# Patient Record
Sex: FEMALE | Race: WHITE | NOT HISPANIC OR LATINO | Employment: OTHER | URBAN - METROPOLITAN AREA
[De-identification: names, ages, dates, MRNs, and addresses within clinical notes are randomized per-mention and may not be internally consistent; named-entity substitution may affect disease eponyms.]

---

## 2024-10-24 ENCOUNTER — HOSPITAL ENCOUNTER (INPATIENT)
Facility: HOSPITAL | Age: 74
DRG: 603 | End: 2024-10-24
Attending: EMERGENCY MEDICINE | Admitting: INTERNAL MEDICINE
Payer: MEDICARE

## 2024-10-24 ENCOUNTER — APPOINTMENT (OUTPATIENT)
Dept: CARDIOLOGY | Facility: HOSPITAL | Age: 74
DRG: 603 | End: 2024-10-24
Payer: MEDICARE

## 2024-10-24 ENCOUNTER — APPOINTMENT (OUTPATIENT)
Dept: RADIOLOGY | Facility: HOSPITAL | Age: 74
DRG: 603 | End: 2024-10-24
Payer: MEDICARE

## 2024-10-24 DIAGNOSIS — R26.2 INABILITY TO WALK: ICD-10-CM

## 2024-10-24 DIAGNOSIS — R62.7 FAILURE TO THRIVE IN ADULT: ICD-10-CM

## 2024-10-24 DIAGNOSIS — R53.1 GENERALIZED WEAKNESS: ICD-10-CM

## 2024-10-24 DIAGNOSIS — R29.6 FREQUENT FALLS: Primary | ICD-10-CM

## 2024-10-24 LAB
ALBUMIN SERPL BCP-MCNC: 3.8 G/DL (ref 3.4–5)
ALP SERPL-CCNC: 70 U/L (ref 33–136)
ALT SERPL W P-5'-P-CCNC: 9 U/L (ref 7–45)
ANION GAP SERPL CALCULATED.3IONS-SCNC: 10 MMOL/L (ref 10–20)
APPEARANCE UR: ABNORMAL
AST SERPL W P-5'-P-CCNC: 9 U/L (ref 9–39)
ATRIAL RATE: 108 BPM
BACTERIA #/AREA URNS AUTO: ABNORMAL /HPF
BASOPHILS # BLD AUTO: 0.06 X10*3/UL (ref 0–0.1)
BASOPHILS NFR BLD AUTO: 0.5 %
BILIRUB SERPL-MCNC: 0.6 MG/DL (ref 0–1.2)
BILIRUB UR STRIP.AUTO-MCNC: NEGATIVE MG/DL
BNP SERPL-MCNC: 39 PG/ML (ref 0–99)
BUN SERPL-MCNC: 9 MG/DL (ref 6–23)
CALCIUM SERPL-MCNC: 9.4 MG/DL (ref 8.6–10.3)
CARDIAC TROPONIN I PNL SERPL HS: 10 NG/L (ref 0–13)
CARDIAC TROPONIN I PNL SERPL HS: 9 NG/L (ref 0–13)
CHLORIDE SERPL-SCNC: 100 MMOL/L (ref 98–107)
CO2 SERPL-SCNC: 31 MMOL/L (ref 21–32)
COLOR UR: ABNORMAL
CREAT SERPL-MCNC: 0.56 MG/DL (ref 0.5–1.05)
EGFRCR SERPLBLD CKD-EPI 2021: >90 ML/MIN/1.73M*2
EOSINOPHIL # BLD AUTO: 0.13 X10*3/UL (ref 0–0.4)
EOSINOPHIL NFR BLD AUTO: 1.2 %
ERYTHROCYTE [DISTWIDTH] IN BLOOD BY AUTOMATED COUNT: 13.3 % (ref 11.5–14.5)
GLUCOSE BLD MANUAL STRIP-MCNC: 165 MG/DL (ref 74–99)
GLUCOSE BLD MANUAL STRIP-MCNC: 198 MG/DL (ref 74–99)
GLUCOSE SERPL-MCNC: 218 MG/DL (ref 74–99)
GLUCOSE UR STRIP.AUTO-MCNC: ABNORMAL MG/DL
HCT VFR BLD AUTO: 43 % (ref 36–46)
HGB BLD-MCNC: 14.6 G/DL (ref 12–16)
IMM GRANULOCYTES # BLD AUTO: 0.04 X10*3/UL (ref 0–0.5)
IMM GRANULOCYTES NFR BLD AUTO: 0.4 % (ref 0–0.9)
KETONES UR STRIP.AUTO-MCNC: NEGATIVE MG/DL
LEUKOCYTE ESTERASE UR QL STRIP.AUTO: ABNORMAL
LYMPHOCYTES # BLD AUTO: 1.41 X10*3/UL (ref 0.8–3)
LYMPHOCYTES NFR BLD AUTO: 12.9 %
MAGNESIUM SERPL-MCNC: 1.96 MG/DL (ref 1.6–2.4)
MCH RBC QN AUTO: 29.3 PG (ref 26–34)
MCHC RBC AUTO-ENTMCNC: 34 G/DL (ref 32–36)
MCV RBC AUTO: 86 FL (ref 80–100)
MONOCYTES # BLD AUTO: 0.91 X10*3/UL (ref 0.05–0.8)
MONOCYTES NFR BLD AUTO: 8.3 %
NEUTROPHILS # BLD AUTO: 8.41 X10*3/UL (ref 1.6–5.5)
NEUTROPHILS NFR BLD AUTO: 76.7 %
NITRITE UR QL STRIP.AUTO: NEGATIVE
NRBC BLD-RTO: 0 /100 WBCS (ref 0–0)
P AXIS: 14 DEGREES
P OFFSET: 188 MS
P ONSET: 128 MS
PH UR STRIP.AUTO: 6 [PH]
PLATELET # BLD AUTO: 361 X10*3/UL (ref 150–450)
POTASSIUM SERPL-SCNC: 3.4 MMOL/L (ref 3.5–5.3)
PR INTERVAL: 196 MS
PROT SERPL-MCNC: 7.1 G/DL (ref 6.4–8.2)
PROT UR STRIP.AUTO-MCNC: NEGATIVE MG/DL
Q ONSET: 226 MS
QRS COUNT: 18 BEATS
QRS DURATION: 60 MS
QT INTERVAL: 322 MS
QTC CALCULATION(BAZETT): 431 MS
QTC FREDERICIA: 391 MS
R AXIS: -11 DEGREES
RBC # BLD AUTO: 4.99 X10*6/UL (ref 4–5.2)
RBC # UR STRIP.AUTO: ABNORMAL /UL
RBC #/AREA URNS AUTO: >20 /HPF
SODIUM SERPL-SCNC: 138 MMOL/L (ref 136–145)
SP GR UR STRIP.AUTO: 1
SQUAMOUS #/AREA URNS AUTO: ABNORMAL /HPF
T AXIS: -14 DEGREES
T OFFSET: 387 MS
UROBILINOGEN UR STRIP.AUTO-MCNC: NORMAL MG/DL
VENTRICULAR RATE: 108 BPM
WBC # BLD AUTO: 11 X10*3/UL (ref 4.4–11.3)
WBC #/AREA URNS AUTO: >50 /HPF
WBC CLUMPS #/AREA URNS AUTO: ABNORMAL /HPF

## 2024-10-24 PROCEDURE — 84484 ASSAY OF TROPONIN QUANT: CPT

## 2024-10-24 PROCEDURE — 93005 ELECTROCARDIOGRAM TRACING: CPT

## 2024-10-24 PROCEDURE — 2500000001 HC RX 250 WO HCPCS SELF ADMINISTERED DRUGS (ALT 637 FOR MEDICARE OP): Performed by: INTERNAL MEDICINE

## 2024-10-24 PROCEDURE — 71045 X-RAY EXAM CHEST 1 VIEW: CPT | Performed by: RADIOLOGY

## 2024-10-24 PROCEDURE — 72170 X-RAY EXAM OF PELVIS: CPT | Performed by: RADIOLOGY

## 2024-10-24 PROCEDURE — 83735 ASSAY OF MAGNESIUM: CPT

## 2024-10-24 PROCEDURE — 36415 COLL VENOUS BLD VENIPUNCTURE: CPT

## 2024-10-24 PROCEDURE — 72170 X-RAY EXAM OF PELVIS: CPT

## 2024-10-24 PROCEDURE — 99285 EMERGENCY DEPT VISIT HI MDM: CPT

## 2024-10-24 PROCEDURE — 81001 URINALYSIS AUTO W/SCOPE: CPT

## 2024-10-24 PROCEDURE — 71045 X-RAY EXAM CHEST 1 VIEW: CPT

## 2024-10-24 PROCEDURE — 82947 ASSAY GLUCOSE BLOOD QUANT: CPT

## 2024-10-24 PROCEDURE — 83880 ASSAY OF NATRIURETIC PEPTIDE: CPT

## 2024-10-24 PROCEDURE — 84132 ASSAY OF SERUM POTASSIUM: CPT

## 2024-10-24 PROCEDURE — 93010 ELECTROCARDIOGRAM REPORT: CPT | Performed by: INTERNAL MEDICINE

## 2024-10-24 PROCEDURE — 87186 SC STD MICRODIL/AGAR DIL: CPT | Mod: WESLAB

## 2024-10-24 PROCEDURE — 2500000004 HC RX 250 GENERAL PHARMACY W/ HCPCS (ALT 636 FOR OP/ED): Performed by: INTERNAL MEDICINE

## 2024-10-24 PROCEDURE — 72100 X-RAY EXAM L-S SPINE 2/3 VWS: CPT | Performed by: RADIOLOGY

## 2024-10-24 PROCEDURE — 2500000001 HC RX 250 WO HCPCS SELF ADMINISTERED DRUGS (ALT 637 FOR MEDICARE OP)

## 2024-10-24 PROCEDURE — 1200000002 HC GENERAL ROOM WITH TELEMETRY DAILY

## 2024-10-24 PROCEDURE — 85025 COMPLETE CBC W/AUTO DIFF WBC: CPT

## 2024-10-24 PROCEDURE — 2500000002 HC RX 250 W HCPCS SELF ADMINISTERED DRUGS (ALT 637 FOR MEDICARE OP, ALT 636 FOR OP/ED): Performed by: INTERNAL MEDICINE

## 2024-10-24 PROCEDURE — 99222 1ST HOSP IP/OBS MODERATE 55: CPT | Performed by: INTERNAL MEDICINE

## 2024-10-24 PROCEDURE — 72100 X-RAY EXAM L-S SPINE 2/3 VWS: CPT

## 2024-10-24 RX ORDER — ACETAMINOPHEN 325 MG/1
975 TABLET ORAL ONCE
Status: COMPLETED | OUTPATIENT
Start: 2024-10-24 | End: 2024-10-24

## 2024-10-24 RX ORDER — ENOXAPARIN SODIUM 100 MG/ML
40 INJECTION SUBCUTANEOUS DAILY
Status: DISCONTINUED | OUTPATIENT
Start: 2024-10-25 | End: 2024-11-04 | Stop reason: HOSPADM

## 2024-10-24 RX ORDER — ACETAMINOPHEN 650 MG/1
650 SUPPOSITORY RECTAL EVERY 4 HOURS PRN
Status: DISCONTINUED | OUTPATIENT
Start: 2024-10-24 | End: 2024-11-04 | Stop reason: HOSPADM

## 2024-10-24 RX ORDER — GUAIFENESIN/DEXTROMETHORPHAN 100-10MG/5
5 SYRUP ORAL EVERY 4 HOURS PRN
Status: DISCONTINUED | OUTPATIENT
Start: 2024-10-24 | End: 2024-11-04 | Stop reason: HOSPADM

## 2024-10-24 RX ORDER — ACETAMINOPHEN 325 MG/1
650 TABLET ORAL EVERY 4 HOURS PRN
Status: DISCONTINUED | OUTPATIENT
Start: 2024-10-24 | End: 2024-11-04 | Stop reason: HOSPADM

## 2024-10-24 RX ORDER — PANTOPRAZOLE SODIUM 40 MG/10ML
40 INJECTION, POWDER, LYOPHILIZED, FOR SOLUTION INTRAVENOUS DAILY
Status: DISCONTINUED | OUTPATIENT
Start: 2024-10-25 | End: 2024-11-04 | Stop reason: HOSPADM

## 2024-10-24 RX ORDER — INSULIN LISPRO 100 [IU]/ML
0-5 INJECTION, SOLUTION INTRAVENOUS; SUBCUTANEOUS
Status: DISCONTINUED | OUTPATIENT
Start: 2024-10-24 | End: 2024-11-04 | Stop reason: HOSPADM

## 2024-10-24 RX ORDER — ACETAMINOPHEN 160 MG/5ML
650 SOLUTION ORAL EVERY 4 HOURS PRN
Status: DISCONTINUED | OUTPATIENT
Start: 2024-10-24 | End: 2024-11-04 | Stop reason: HOSPADM

## 2024-10-24 RX ORDER — TALC
3 POWDER (GRAM) TOPICAL NIGHTLY PRN
Status: DISCONTINUED | OUTPATIENT
Start: 2024-10-24 | End: 2024-11-04 | Stop reason: HOSPADM

## 2024-10-24 RX ORDER — PANTOPRAZOLE SODIUM 40 MG/1
40 TABLET, DELAYED RELEASE ORAL DAILY
Status: DISCONTINUED | OUTPATIENT
Start: 2024-10-25 | End: 2024-11-04 | Stop reason: HOSPADM

## 2024-10-24 RX ORDER — DOCUSATE SODIUM 100 MG/1
100 CAPSULE, LIQUID FILLED ORAL 2 TIMES DAILY
Status: DISCONTINUED | OUTPATIENT
Start: 2024-10-24 | End: 2024-11-04 | Stop reason: HOSPADM

## 2024-10-24 RX ORDER — ONDANSETRON 4 MG/1
4 TABLET, FILM COATED ORAL EVERY 8 HOURS PRN
Status: DISCONTINUED | OUTPATIENT
Start: 2024-10-24 | End: 2024-11-04 | Stop reason: HOSPADM

## 2024-10-24 RX ORDER — ONDANSETRON HYDROCHLORIDE 2 MG/ML
4 INJECTION, SOLUTION INTRAVENOUS EVERY 8 HOURS PRN
Status: DISCONTINUED | OUTPATIENT
Start: 2024-10-24 | End: 2024-11-04 | Stop reason: HOSPADM

## 2024-10-24 RX ORDER — GUAIFENESIN 600 MG/1
600 TABLET, EXTENDED RELEASE ORAL EVERY 12 HOURS PRN
Status: DISCONTINUED | OUTPATIENT
Start: 2024-10-24 | End: 2024-11-04 | Stop reason: HOSPADM

## 2024-10-24 RX ORDER — POLYETHYLENE GLYCOL 3350 17 G/17G
17 POWDER, FOR SOLUTION ORAL DAILY
Status: DISCONTINUED | OUTPATIENT
Start: 2024-10-24 | End: 2024-11-04 | Stop reason: HOSPADM

## 2024-10-24 SDOH — SOCIAL STABILITY: SOCIAL INSECURITY: ARE YOU OR HAVE YOU BEEN THREATENED OR ABUSED PHYSICALLY, EMOTIONALLY, OR SEXUALLY BY ANYONE?: NO

## 2024-10-24 SDOH — SOCIAL STABILITY: SOCIAL INSECURITY: ARE THERE ANY APPARENT SIGNS OF INJURIES/BEHAVIORS THAT COULD BE RELATED TO ABUSE/NEGLECT?: NO

## 2024-10-24 SDOH — SOCIAL STABILITY: SOCIAL INSECURITY: ABUSE: ADULT

## 2024-10-24 SDOH — SOCIAL STABILITY: SOCIAL INSECURITY: DO YOU FEEL UNSAFE GOING BACK TO THE PLACE WHERE YOU ARE LIVING?: NO

## 2024-10-24 SDOH — SOCIAL STABILITY: SOCIAL INSECURITY: HAS ANYONE EVER THREATENED TO HURT YOUR FAMILY OR YOUR PETS?: NO

## 2024-10-24 SDOH — SOCIAL STABILITY: SOCIAL INSECURITY: HAVE YOU HAD THOUGHTS OF HARMING ANYONE ELSE?: NO

## 2024-10-24 SDOH — SOCIAL STABILITY: SOCIAL INSECURITY: HAVE YOU HAD ANY THOUGHTS OF HARMING ANYONE ELSE?: NO

## 2024-10-24 SDOH — SOCIAL STABILITY: SOCIAL INSECURITY: DO YOU FEEL ANYONE HAS EXPLOITED OR TAKEN ADVANTAGE OF YOU FINANCIALLY OR OF YOUR PERSONAL PROPERTY?: NO

## 2024-10-24 SDOH — SOCIAL STABILITY: SOCIAL INSECURITY: DOES ANYONE TRY TO KEEP YOU FROM HAVING/CONTACTING OTHER FRIENDS OR DOING THINGS OUTSIDE YOUR HOME?: NO

## 2024-10-24 SDOH — SOCIAL STABILITY: SOCIAL INSECURITY: WERE YOU ABLE TO COMPLETE ALL THE BEHAVIORAL HEALTH SCREENINGS?: YES

## 2024-10-24 ASSESSMENT — ACTIVITIES OF DAILY LIVING (ADL)
GROOMING: NEEDS ASSISTANCE
HEARING - LEFT EAR: FUNCTIONAL
ASSISTIVE_DEVICE: EYEGLASSES
DRESSING YOURSELF: DEPENDENT
TOILETING: DEPENDENT
WALKS IN HOME: DEPENDENT
FEEDING YOURSELF: NEEDS ASSISTANCE
BATHING: DEPENDENT
JUDGMENT_ADEQUATE_SAFELY_COMPLETE_DAILY_ACTIVITIES: YES
PATIENT'S MEMORY ADEQUATE TO SAFELY COMPLETE DAILY ACTIVITIES?: YES
ADEQUATE_TO_COMPLETE_ADL: YES
HEARING - RIGHT EAR: FUNCTIONAL

## 2024-10-24 ASSESSMENT — ENCOUNTER SYMPTOMS
UNEXPECTED WEIGHT CHANGE: 0
EYE PAIN: 0
POLYPHAGIA: 0
WHEEZING: 0
CHILLS: 0
WOUND: 0
HALLUCINATIONS: 0
ABDOMINAL DISTENTION: 0
DYSURIA: 0
DIZZINESS: 0
DYSPHORIC MOOD: 0
TROUBLE SWALLOWING: 0
MYALGIAS: 0
CHEST TIGHTNESS: 0
CONFUSION: 0
FLANK PAIN: 0
APPETITE CHANGE: 0
LIGHT-HEADEDNESS: 0
AGITATION: 0
EYE ITCHING: 0
SEIZURES: 0
DIARRHEA: 0
SPEECH DIFFICULTY: 0
SORE THROAT: 0
JOINT SWELLING: 0
ARTHRALGIAS: 0
CONSTIPATION: 0
SLEEP DISTURBANCE: 0
NERVOUS/ANXIOUS: 0
HEADACHES: 0
STRIDOR: 0
ANAL BLEEDING: 0
NUMBNESS: 0
RECTAL PAIN: 0
WEAKNESS: 0
COUGH: 0
ACTIVITY CHANGE: 0
HEMATURIA: 0
HYPERACTIVE: 0
DIFFICULTY URINATING: 0
POLYDIPSIA: 0
TREMORS: 0
PHOTOPHOBIA: 0
VOICE CHANGE: 0
NECK PAIN: 0
COLOR CHANGE: 0
SINUS PRESSURE: 0
CHOKING: 0
BLOOD IN STOOL: 0
ADENOPATHY: 0
DECREASED CONCENTRATION: 0
FREQUENCY: 0
PALPITATIONS: 0
EYE DISCHARGE: 0
NAUSEA: 0
EYE REDNESS: 0
FEVER: 0
BRUISES/BLEEDS EASILY: 0
FACIAL SWELLING: 0
VOMITING: 0
FATIGUE: 0
FACIAL ASYMMETRY: 0
APNEA: 0
DIAPHORESIS: 0
ABDOMINAL PAIN: 0
BACK PAIN: 0
RHINORRHEA: 0
SHORTNESS OF BREATH: 0
SINUS PAIN: 0
NECK STIFFNESS: 0

## 2024-10-24 ASSESSMENT — PAIN - FUNCTIONAL ASSESSMENT
PAIN_FUNCTIONAL_ASSESSMENT: 0-10

## 2024-10-24 ASSESSMENT — COGNITIVE AND FUNCTIONAL STATUS - GENERAL
DRESSING REGULAR UPPER BODY CLOTHING: A LITTLE
DRESSING REGULAR LOWER BODY CLOTHING: A LOT
CLIMB 3 TO 5 STEPS WITH RAILING: TOTAL
DRESSING REGULAR UPPER BODY CLOTHING: A LITTLE
WALKING IN HOSPITAL ROOM: TOTAL
MOVING TO AND FROM BED TO CHAIR: A LOT
DAILY ACTIVITIY SCORE: 13
HELP NEEDED FOR BATHING: TOTAL
CLIMB 3 TO 5 STEPS WITH RAILING: TOTAL
TURNING FROM BACK TO SIDE WHILE IN FLAT BAD: A LOT
DRESSING REGULAR UPPER BODY CLOTHING: A LITTLE
PATIENT BASELINE BEDBOUND: NO
DAILY ACTIVITIY SCORE: 13
MOBILITY SCORE: 10
TOILETING: TOTAL
MOVING FROM LYING ON BACK TO SITTING ON SIDE OF FLAT BED WITH BEDRAILS: A LOT
STANDING UP FROM CHAIR USING ARMS: A LOT
WALKING IN HOSPITAL ROOM: TOTAL
MOBILITY SCORE: 10
MOVING FROM LYING ON BACK TO SITTING ON SIDE OF FLAT BED WITH BEDRAILS: A LOT
MOVING TO AND FROM BED TO CHAIR: A LOT
DRESSING REGULAR LOWER BODY CLOTHING: A LOT
DRESSING REGULAR LOWER BODY CLOTHING: A LOT
PERSONAL GROOMING: A LITTLE
MOBILITY SCORE: 10
MOVING TO AND FROM BED TO CHAIR: A LOT
CLIMB 3 TO 5 STEPS WITH RAILING: TOTAL
HELP NEEDED FOR BATHING: TOTAL
EATING MEALS: A LITTLE
WALKING IN HOSPITAL ROOM: TOTAL
TURNING FROM BACK TO SIDE WHILE IN FLAT BAD: A LOT
MOVING FROM LYING ON BACK TO SITTING ON SIDE OF FLAT BED WITH BEDRAILS: A LOT
PERSONAL GROOMING: A LITTLE
DAILY ACTIVITIY SCORE: 13
HELP NEEDED FOR BATHING: TOTAL
EATING MEALS: A LITTLE
TOILETING: TOTAL
PERSONAL GROOMING: A LITTLE
STANDING UP FROM CHAIR USING ARMS: A LOT
STANDING UP FROM CHAIR USING ARMS: A LOT
TURNING FROM BACK TO SIDE WHILE IN FLAT BAD: A LOT
TOILETING: TOTAL
EATING MEALS: A LITTLE

## 2024-10-24 ASSESSMENT — LIFESTYLE VARIABLES
AUDIT-C TOTAL SCORE: 0
SUBSTANCE_ABUSE_PAST_12_MONTHS: NO
HOW MANY STANDARD DRINKS CONTAINING ALCOHOL DO YOU HAVE ON A TYPICAL DAY: PATIENT DOES NOT DRINK
PRESCIPTION_ABUSE_PAST_12_MONTHS: NO
HOW OFTEN DO YOU HAVE 6 OR MORE DRINKS ON ONE OCCASION: NEVER
HOW OFTEN DO YOU HAVE A DRINK CONTAINING ALCOHOL: NEVER
AUDIT-C TOTAL SCORE: 0
SKIP TO QUESTIONS 9-10: 1

## 2024-10-24 ASSESSMENT — PAIN DESCRIPTION - ORIENTATION: ORIENTATION: RIGHT

## 2024-10-24 ASSESSMENT — PATIENT HEALTH QUESTIONNAIRE - PHQ9
1. LITTLE INTEREST OR PLEASURE IN DOING THINGS: NOT AT ALL
SUM OF ALL RESPONSES TO PHQ9 QUESTIONS 1 & 2: 0
2. FEELING DOWN, DEPRESSED OR HOPELESS: NOT AT ALL

## 2024-10-24 ASSESSMENT — PAIN DESCRIPTION - DESCRIPTORS
DESCRIPTORS: ACHING
DESCRIPTORS: ACHING

## 2024-10-24 ASSESSMENT — PAIN DESCRIPTION - LOCATION: LOCATION: KNEE

## 2024-10-24 ASSESSMENT — PAIN SCALES - GENERAL
PAINLEVEL_OUTOF10: 0 - NO PAIN
PAINLEVEL_OUTOF10: 0 - NO PAIN
PAINLEVEL_OUTOF10: 3
PAINLEVEL_OUTOF10: 7
PAINLEVEL_OUTOF10: 2

## 2024-10-24 ASSESSMENT — COLUMBIA-SUICIDE SEVERITY RATING SCALE - C-SSRS
2. HAVE YOU ACTUALLY HAD ANY THOUGHTS OF KILLING YOURSELF?: NO
1. IN THE PAST MONTH, HAVE YOU WISHED YOU WERE DEAD OR WISHED YOU COULD GO TO SLEEP AND NOT WAKE UP?: NO
6. HAVE YOU EVER DONE ANYTHING, STARTED TO DO ANYTHING, OR PREPARED TO DO ANYTHING TO END YOUR LIFE?: NO

## 2024-10-24 NOTE — PROGRESS NOTES
Pharmacy Medication History Review    Marianela Green is a 74 y.o. female admitted for Frequent falls. Pharmacy reviewed the patient's hhzdu-co-rixdbutnp medications and allergies for accuracy.    Medications ADDED:  none  Medications CHANGED:  none  Medications REMOVED:   None      The list below reflects the updated PTA list. Comments regarding how patient may be taking medications differently can be found in the Admit Orders Activity  None        The list below reflects the updated allergy list. Please review each documented allergy for additional clarification and justification.  Allergies  Reviewed by Hue Zhang CPhT on 10/24/2024   No Known Allergies         Pharmacy has been updated to [facility].    Sources used to complete the med history include patient interview. Patient is a fair historian.    Below are additional concerns with the patient's PTA list.  Patient reports not taking any prescription medications or OTC medications.     Hue Zhang CPhT-Adv  Please reach out via [employer] Secure Chat for questions

## 2024-10-24 NOTE — PROGRESS NOTES
Attestation/Supervisory note for ISABELA Kaminski      The patient is a 74-year-old female presenting to the emergency department for evaluation of generalized malaise and fatigue.  The patient reportedly did have a fall about 1 week ago according to her daughter.  The patient states that she just does not have any energy.  She does not have any pain at this time she denies any headache or visual changes.  No chest pain or shortness of breath.  No abdominal pain.  No nausea or vomiting.  No diarrhea or constipation but no urinary complaints.  No fever or chills.  No cough or congestion.  No sick contacts or recent travel.  No cough or congestion.  She does have a home health care nurse who comes and checks on her.  All pertinent positives and negatives are recorded above.  All other systems reviewed and otherwise negative.  Vital signs with diastolic hypertension but otherwise within normal limits.  Physical exam with a well-nourished well-developed female in no acute distress.  She does have a disheveled appearance.  She was decontaminated upon arrival reportedly with a concern for bedbugs.  She has no evidence of airway compromise or respiratory distress.  Abdominal exam is benign.  She does not have any gross motor, neurologic or vascular deficits on exam.  Pulses are equal bilaterally.  NIH stroke scale score of 0.      tPA/TNK is not indicated at this time given last known well time of at least 1 week ago and an NIH stroke scale score of 0 at this time.      EKG with sinus tachycardia at 111 bpm, normal axis, normal voltage, normal ST segment, and normal T waves.  There is some mild limitation by motion artifact.      Oral acetaminophen ordered.      Diagnostic labs with mild electrolyte imbalance and mild hyperglycemia but otherwise unremarkable.    Initial troponin 10.  Repeat troponin 9      UA pending at the time of admission      XR chest 1 view   Final Result   1.  Newly seen leftward paravertebral density is  indeterminate.   Attention to this region recommended on follow-up assessment. If not   previously evaluated recommend further evaluation such as CT   examination.                  MACRO:   None        Signed by: Stephane Tom 10/24/2024 12:34 PM   Dictation workstation:   [id number]      XR lumbar spine 2-3 views   Final Result   No detected acute fracture.        Scattered degenerative changes.        Cholelithiasis.             MACRO:   None        Signed by: Stephane Tom 10/24/2024 12:36 PM   Dictation workstation:   [id number]      XR pelvis 1-2 views   Final Result   No detected acute fracture.        Scattered degenerative changes.        Cholelithiasis.             MACRO:   None        Signed by: Stephane Tom 10/24/2024 12:36 PM   Dictation workstation:   [id number]           The patient does not have any evidence of airway compromise or respiratory distress on exam but she does not have any evidence of hemodynamic instability.  Chest x-ray with a newly seen leftward paravertebral density that is indeterminate per the radiology reading.  There is no evidence of fracture or dislocation on the lumbar spine.  No evidence of fracture or dislocation on the pelvic x-ray.  Diagnostic labs without significant abnormality.      The patient was admitted for further management and anticipated transition to rehab versus long-term care.      Impression/diagnosis:  Fall from standing height, initial encounter  Malaise and fatigue  Diastolic hypertension      I personally saw the patient and made/approve the management plan and take responsibility for the patient management.      I independently interpreted the following study (S) EKG and diagnostic labs      I personally discussed the patient's management with the patient      I reviewed the results of the diagnostic labs and diagnostic imaging.  Formal radiology read was completed by the radiologist.      Anuradha Campos MD

## 2024-10-24 NOTE — ED PROVIDER NOTES
HPI   Chief Complaint   Patient presents with    Weakness, Gen     Generalized weakness. Patient lives home alone. She is covered in feces and urine.  She states her daughter comes to take care of her but states she is unable to walk around. She states she has chronic knee pain.. she is alert and oriented x3 at this time. She states she feels safe at home and feels she can go back there.        Patient is a 74-year-old female presents emergency department for evaluation of generalized fatigue malaise and difficulty with ambulation at home.  Patient states that she lives at home by herself, but has people to come check on her and help her with her activities of daily living.  Patient states that last Thursday she had a mechanical fall and landed on her bottom and has been having pelvic pain and low back pain ever since.  She states that she does not build to ambulate around her house given the pain and generalized fatigue malaise.  She states that she needs more help and thinks she may need a walker for evaluation for better home care moving forward.  He she states that she has not had any nausea or vomiting and denies any recent illnesses.  She not hit her head or lose any consciousness.  She states at the time of the fall she had called EMS for lift assist, but declined transport to the hospital as she was feeling well.  She not hit her head or lose any consciousness.  She denies any fevers, chills, lightheadedness, dizziness, chest pain, shortness of breath, or other complaints at this time.      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  No family history on file.  Social History     Tobacco Use    Smoking status: Not on file    Smokeless tobacco: Not on file   Substance Use Topics    Alcohol use: Not on file     Drug use: Not on file       Physical Exam   ED Triage Vitals [10/24/24 0952]   Temperature Heart Rate Respirations BP   36.9 °C (98.4 °F) 88 18 (!) 148/100      Pulse Ox Temp Source Heart Rate Source Patient Position   99 % Oral Monitor Lying      BP Location FiO2 (%)     -- --       Physical Exam  Constitutional:       Appearance: Normal appearance.   Cardiovascular:      Rate and Rhythm: Normal rate and regular rhythm.   Pulmonary:      Effort: Pulmonary effort is normal.      Breath sounds: Normal breath sounds.   Abdominal:      General: Abdomen is flat.      Palpations: Abdomen is soft.      Tenderness: There is no abdominal tenderness.   Musculoskeletal:         General: Normal range of motion.      Comments: Bilateral lower extremities with range of motion intact bilateral hips with some pain to palpation over pelvis.  Bilateral lower extremities with good sensation and reflexes intact.   Skin:     General: Skin is warm and dry.   Neurological:      General: No focal deficit present.      Mental Status: She is alert and oriented to person, place, and time.           ED Course & MDM   Diagnoses as of 10/24/24 0518   Generalized weakness   Frequent falls   Inability to walk   Failure to thrive in adult                 No data recorded     Spenser Coma Scale Score: 15 (10/24/24 1005 : Sabrina Arrington, LEX)                           Medical Decision Making  Patient is a 74-year-old female presents emergency department for evaluation of generalized fatigue and inability to ambulate well at home.    EKG was interpreted by attending physician and reviewed by me.    Lab work done today included CMP, CBC, magnesium, troponins, proBNP, urinalysis.  Lab work with troponins within normal range borderline hypokalemia otherwise unremarkable.  Urine pending at time admission.    Scans done today were interpreted/confirmed by radiologist and also interpreted by me which included chest x-ray, pelvic x-ray, and lumbar x-ray.   Chest x-ray shows paravertebral density indeterminate with x-ray of lumbar spine and pelvis showing no detected acute fracture with scattered degenerative changes and cholelithiasis otherwise unremarkable.    Medications given at today's visit include PO Tylenol    I saw this patient in conjunction with Dr. Campos.  Patient remained stable while in the emergency department.  EKG shows no evidence ischemia and troponins within normal range.  Lab work today relatively unremarkable.  Patient's daughter did speak with her primary care provider Dr. Ling who wanted patient admitted for further evaluation of decline and inability to ambulate.  Patient appears to have had some frequent falls and difficulty getting around at home and taking care of self at home.  Daughter states she is very stubborn and does unsure as to if she is taking her medications.  X-ray showed no evidence for acute fracture at this time, but given patient's inability to ambulate with frequent falls and inability care for self at home patient would benefit from admission for physical therapy and Occupational Therapy evaluation and likely placement.  I spoke with patient's primary care provider Dr. Ling who is agreeable to admission of patient for further management.    The patient/family was counseled on clinical impression, expectations, and plan along with recommendations to admission.  All questions were answered and involved parties were understanding and agreeable to course of treatment.  Case was discussed with admitting physician and any consultants. Bed type, ED treatment and further ED workup decided by joint decision making with admitting team and any consultants. Patient stable for admission per my assessment and further management of patient will be deferred to the inpatient setting.    ** Disclaimer:  Parts of this document were written utilizing a voice to text dictation software.  Note may contain minor transcription or typographical  errors that were inadvertently transcribed by the computer software.        Procedure  Procedures     Radha Kaminski PA-C  10/24/24 8634

## 2024-10-25 ENCOUNTER — APPOINTMENT (OUTPATIENT)
Dept: RADIOLOGY | Facility: HOSPITAL | Age: 74
DRG: 603 | End: 2024-10-25
Payer: MEDICARE

## 2024-10-25 PROBLEM — B37.2 CANDIDIASIS OF SKIN: Status: ACTIVE | Noted: 2024-10-25

## 2024-10-25 PROBLEM — R73.9 ELEVATED BLOOD SUGAR LEVEL: Status: ACTIVE | Noted: 2024-10-25

## 2024-10-25 PROBLEM — R62.51 FAILURE TO THRIVE (CHILD): Status: ACTIVE | Noted: 2024-10-25

## 2024-10-25 PROBLEM — R93.89 ABNORMAL CHEST X-RAY: Status: ACTIVE | Noted: 2024-10-25

## 2024-10-25 PROBLEM — L03.116 BILATERAL LOWER LEG CELLULITIS: Status: ACTIVE | Noted: 2024-10-25

## 2024-10-25 PROBLEM — I10 HYPERTENSION: Status: ACTIVE | Noted: 2024-10-25

## 2024-10-25 PROBLEM — J44.9 COPD (CHRONIC OBSTRUCTIVE PULMONARY DISEASE) (MULTI): Status: ACTIVE | Noted: 2024-10-25

## 2024-10-25 PROBLEM — L03.115 BILATERAL LOWER LEG CELLULITIS: Status: ACTIVE | Noted: 2024-10-25

## 2024-10-25 LAB
ALBUMIN SERPL BCP-MCNC: 3.3 G/DL (ref 3.4–5)
ALP SERPL-CCNC: 61 U/L (ref 33–136)
ALT SERPL W P-5'-P-CCNC: 8 U/L (ref 7–45)
ANION GAP SERPL CALCULATED.3IONS-SCNC: 11 MMOL/L (ref 10–20)
AST SERPL W P-5'-P-CCNC: 9 U/L (ref 9–39)
BILIRUB SERPL-MCNC: 0.6 MG/DL (ref 0–1.2)
BUN SERPL-MCNC: 6 MG/DL (ref 6–23)
CALCIUM SERPL-MCNC: 9 MG/DL (ref 8.6–10.3)
CHLORIDE SERPL-SCNC: 102 MMOL/L (ref 98–107)
CO2 SERPL-SCNC: 29 MMOL/L (ref 21–32)
CREAT SERPL-MCNC: 0.54 MG/DL (ref 0.5–1.05)
EGFRCR SERPLBLD CKD-EPI 2021: >90 ML/MIN/1.73M*2
ERYTHROCYTE [DISTWIDTH] IN BLOOD BY AUTOMATED COUNT: 13.3 % (ref 11.5–14.5)
GLUCOSE BLD MANUAL STRIP-MCNC: 150 MG/DL (ref 74–99)
GLUCOSE BLD MANUAL STRIP-MCNC: 156 MG/DL (ref 74–99)
GLUCOSE BLD MANUAL STRIP-MCNC: 169 MG/DL (ref 74–99)
GLUCOSE BLD MANUAL STRIP-MCNC: 172 MG/DL (ref 74–99)
GLUCOSE SERPL-MCNC: 167 MG/DL (ref 74–99)
HCT VFR BLD AUTO: 40.4 % (ref 36–46)
HGB BLD-MCNC: 13.3 G/DL (ref 12–16)
HOLD SPECIMEN: NORMAL
MCH RBC QN AUTO: 29.1 PG (ref 26–34)
MCHC RBC AUTO-ENTMCNC: 32.9 G/DL (ref 32–36)
MCV RBC AUTO: 88 FL (ref 80–100)
NRBC BLD-RTO: 0 /100 WBCS (ref 0–0)
PLATELET # BLD AUTO: 383 X10*3/UL (ref 150–450)
POTASSIUM SERPL-SCNC: 3.7 MMOL/L (ref 3.5–5.3)
PROT SERPL-MCNC: 6 G/DL (ref 6.4–8.2)
RBC # BLD AUTO: 4.57 X10*6/UL (ref 4–5.2)
SODIUM SERPL-SCNC: 138 MMOL/L (ref 136–145)
WBC # BLD AUTO: 9.7 X10*3/UL (ref 4.4–11.3)

## 2024-10-25 PROCEDURE — 1200000002 HC GENERAL ROOM WITH TELEMETRY DAILY

## 2024-10-25 PROCEDURE — 2500000002 HC RX 250 W HCPCS SELF ADMINISTERED DRUGS (ALT 637 FOR MEDICARE OP, ALT 636 FOR OP/ED): Performed by: INTERNAL MEDICINE

## 2024-10-25 PROCEDURE — 2500000005 HC RX 250 GENERAL PHARMACY W/O HCPCS: Performed by: INTERNAL MEDICINE

## 2024-10-25 PROCEDURE — 36415 COLL VENOUS BLD VENIPUNCTURE: CPT | Performed by: INTERNAL MEDICINE

## 2024-10-25 PROCEDURE — 2500000004 HC RX 250 GENERAL PHARMACY W/ HCPCS (ALT 636 FOR OP/ED): Performed by: INTERNAL MEDICINE

## 2024-10-25 PROCEDURE — 97162 PT EVAL MOD COMPLEX 30 MIN: CPT | Mod: GP

## 2024-10-25 PROCEDURE — 82947 ASSAY GLUCOSE BLOOD QUANT: CPT

## 2024-10-25 PROCEDURE — 2500000001 HC RX 250 WO HCPCS SELF ADMINISTERED DRUGS (ALT 637 FOR MEDICARE OP): Performed by: INTERNAL MEDICINE

## 2024-10-25 PROCEDURE — 71260 CT THORAX DX C+: CPT

## 2024-10-25 PROCEDURE — 85027 COMPLETE CBC AUTOMATED: CPT | Performed by: INTERNAL MEDICINE

## 2024-10-25 PROCEDURE — 2550000001 HC RX 255 CONTRASTS: Performed by: INTERNAL MEDICINE

## 2024-10-25 PROCEDURE — 97166 OT EVAL MOD COMPLEX 45 MIN: CPT | Mod: GO

## 2024-10-25 PROCEDURE — 80053 COMPREHEN METABOLIC PANEL: CPT | Performed by: INTERNAL MEDICINE

## 2024-10-25 PROCEDURE — 71260 CT THORAX DX C+: CPT | Performed by: RADIOLOGY

## 2024-10-25 PROCEDURE — 99233 SBSQ HOSP IP/OBS HIGH 50: CPT | Performed by: INTERNAL MEDICINE

## 2024-10-25 RX ORDER — CEFTRIAXONE 1 G/50ML
1 INJECTION, SOLUTION INTRAVENOUS EVERY 24 HOURS
Status: DISCONTINUED | OUTPATIENT
Start: 2024-10-25 | End: 2024-11-04 | Stop reason: HOSPADM

## 2024-10-25 RX ORDER — NYSTATIN 100000 [USP'U]/G
POWDER TOPICAL EVERY 8 HOURS
Status: DISCONTINUED | OUTPATIENT
Start: 2024-10-25 | End: 2024-11-04 | Stop reason: HOSPADM

## 2024-10-25 SDOH — ECONOMIC STABILITY: HOUSING INSECURITY: IN THE PAST 12 MONTHS, HOW MANY TIMES HAVE YOU MOVED WHERE YOU WERE LIVING?: 0

## 2024-10-25 SDOH — SOCIAL STABILITY: SOCIAL NETWORK: HOW OFTEN DO YOU ATTEND MEETINGS OF THE CLUBS OR ORGANIZATIONS YOU BELONG TO?: NEVER

## 2024-10-25 SDOH — HEALTH STABILITY: PHYSICAL HEALTH
HOW OFTEN DO YOU NEED TO HAVE SOMEONE HELP YOU WHEN YOU READ INSTRUCTIONS, PAMPHLETS, OR OTHER WRITTEN MATERIAL FROM YOUR DOCTOR OR PHARMACY?: NEVER

## 2024-10-25 SDOH — SOCIAL STABILITY: SOCIAL INSECURITY
WITHIN THE LAST YEAR, HAVE YOU BEEN RAPED OR FORCED TO HAVE ANY KIND OF SEXUAL ACTIVITY BY YOUR PARTNER OR EX-PARTNER?: NO

## 2024-10-25 SDOH — SOCIAL STABILITY: SOCIAL NETWORK
DO YOU BELONG TO ANY CLUBS OR ORGANIZATIONS SUCH AS CHURCH GROUPS, UNIONS, FRATERNAL OR ATHLETIC GROUPS, OR SCHOOL GROUPS?: NO

## 2024-10-25 SDOH — HEALTH STABILITY: MENTAL HEALTH: HOW OFTEN DO YOU HAVE A DRINK CONTAINING ALCOHOL?: NEVER

## 2024-10-25 SDOH — SOCIAL STABILITY: SOCIAL INSECURITY: WITHIN THE LAST YEAR, HAVE YOU BEEN AFRAID OF YOUR PARTNER OR EX-PARTNER?: NO

## 2024-10-25 SDOH — ECONOMIC STABILITY: HOUSING INSECURITY: AT ANY TIME IN THE PAST 12 MONTHS, WERE YOU HOMELESS OR LIVING IN A SHELTER (INCLUDING NOW)?: NO

## 2024-10-25 SDOH — HEALTH STABILITY: MENTAL HEALTH: HOW OFTEN DO YOU HAVE SIX OR MORE DRINKS ON ONE OCCASION?: NEVER

## 2024-10-25 SDOH — ECONOMIC STABILITY: INCOME INSECURITY: IN THE PAST 12 MONTHS HAS THE ELECTRIC, GAS, OIL, OR WATER COMPANY THREATENED TO SHUT OFF SERVICES IN YOUR HOME?: NO

## 2024-10-25 SDOH — HEALTH STABILITY: MENTAL HEALTH
DO YOU FEEL STRESS - TENSE, RESTLESS, NERVOUS, OR ANXIOUS, OR UNABLE TO SLEEP AT NIGHT BECAUSE YOUR MIND IS TROUBLED ALL THE TIME - THESE DAYS?: NOT AT ALL

## 2024-10-25 SDOH — ECONOMIC STABILITY: FOOD INSECURITY: WITHIN THE PAST 12 MONTHS, THE FOOD YOU BOUGHT JUST DIDN'T LAST AND YOU DIDN'T HAVE MONEY TO GET MORE.: NEVER TRUE

## 2024-10-25 SDOH — SOCIAL STABILITY: SOCIAL NETWORK: IN A TYPICAL WEEK, HOW MANY TIMES DO YOU TALK ON THE PHONE WITH FAMILY, FRIENDS, OR NEIGHBORS?: TWICE A WEEK

## 2024-10-25 SDOH — SOCIAL STABILITY: SOCIAL INSECURITY: WITHIN THE LAST YEAR, HAVE YOU BEEN HUMILIATED OR EMOTIONALLY ABUSED IN OTHER WAYS BY YOUR PARTNER OR EX-PARTNER?: NO

## 2024-10-25 SDOH — ECONOMIC STABILITY: FOOD INSECURITY: WITHIN THE PAST 12 MONTHS, YOU WORRIED THAT YOUR FOOD WOULD RUN OUT BEFORE YOU GOT THE MONEY TO BUY MORE.: NEVER TRUE

## 2024-10-25 SDOH — SOCIAL STABILITY: SOCIAL INSECURITY
WITHIN THE LAST YEAR, HAVE YOU BEEN KICKED, HIT, SLAPPED, OR OTHERWISE PHYSICALLY HURT BY YOUR PARTNER OR EX-PARTNER?: NO

## 2024-10-25 SDOH — ECONOMIC STABILITY: TRANSPORTATION INSECURITY: IN THE PAST 12 MONTHS, HAS LACK OF TRANSPORTATION KEPT YOU FROM MEDICAL APPOINTMENTS OR FROM GETTING MEDICATIONS?: YES

## 2024-10-25 SDOH — SOCIAL STABILITY: SOCIAL INSECURITY: ARE YOU MARRIED, WIDOWED, DIVORCED, SEPARATED, NEVER MARRIED, OR LIVING WITH A PARTNER?: WIDOWED

## 2024-10-25 SDOH — HEALTH STABILITY: PHYSICAL HEALTH: ON AVERAGE, HOW MANY MINUTES DO YOU ENGAGE IN EXERCISE AT THIS LEVEL?: 0 MIN

## 2024-10-25 SDOH — ECONOMIC STABILITY: FOOD INSECURITY: HOW HARD IS IT FOR YOU TO PAY FOR THE VERY BASICS LIKE FOOD, HOUSING, MEDICAL CARE, AND HEATING?: NOT HARD AT ALL

## 2024-10-25 SDOH — ECONOMIC STABILITY: HOUSING INSECURITY: IN THE LAST 12 MONTHS, WAS THERE A TIME WHEN YOU WERE NOT ABLE TO PAY THE MORTGAGE OR RENT ON TIME?: NO

## 2024-10-25 SDOH — HEALTH STABILITY: PHYSICAL HEALTH: ON AVERAGE, HOW MANY DAYS PER WEEK DO YOU ENGAGE IN MODERATE TO STRENUOUS EXERCISE (LIKE A BRISK WALK)?: 0 DAYS

## 2024-10-25 SDOH — SOCIAL STABILITY: SOCIAL NETWORK: HOW OFTEN DO YOU GET TOGETHER WITH FRIENDS OR RELATIVES?: NEVER

## 2024-10-25 SDOH — HEALTH STABILITY: MENTAL HEALTH: HOW MANY DRINKS CONTAINING ALCOHOL DO YOU HAVE ON A TYPICAL DAY WHEN YOU ARE DRINKING?: PATIENT DOES NOT DRINK

## 2024-10-25 SDOH — SOCIAL STABILITY: SOCIAL NETWORK: HOW OFTEN DO YOU ATTEND CHURCH OR RELIGIOUS SERVICES?: NEVER

## 2024-10-25 ASSESSMENT — PAIN DESCRIPTION - ORIENTATION: ORIENTATION: LEFT

## 2024-10-25 ASSESSMENT — COGNITIVE AND FUNCTIONAL STATUS - GENERAL
PERSONAL GROOMING: A LOT
MOVING FROM LYING ON BACK TO SITTING ON SIDE OF FLAT BED WITH BEDRAILS: TOTAL
CLIMB 3 TO 5 STEPS WITH RAILING: TOTAL
PERSONAL GROOMING: A LITTLE
STANDING UP FROM CHAIR USING ARMS: TOTAL
DRESSING REGULAR UPPER BODY CLOTHING: A LOT
MOBILITY SCORE: 10
PERSONAL GROOMING: A LITTLE
HELP NEEDED FOR BATHING: TOTAL
EATING MEALS: A LITTLE
DRESSING REGULAR LOWER BODY CLOTHING: A LOT
MOVING TO AND FROM BED TO CHAIR: TOTAL
DRESSING REGULAR LOWER BODY CLOTHING: A LOT
EATING MEALS: A LITTLE
DRESSING REGULAR LOWER BODY CLOTHING: TOTAL
TURNING FROM BACK TO SIDE WHILE IN FLAT BAD: A LOT
WALKING IN HOSPITAL ROOM: TOTAL
DAILY ACTIVITIY SCORE: 13
TOILETING: TOTAL
MOVING FROM LYING ON BACK TO SITTING ON SIDE OF FLAT BED WITH BEDRAILS: A LOT
MOVING TO AND FROM BED TO CHAIR: A LOT
STANDING UP FROM CHAIR USING ARMS: A LOT
DRESSING REGULAR UPPER BODY CLOTHING: A LITTLE
HELP NEEDED FOR BATHING: A LOT
WALKING IN HOSPITAL ROOM: TOTAL
CLIMB 3 TO 5 STEPS WITH RAILING: TOTAL
WALKING IN HOSPITAL ROOM: TOTAL
MOVING FROM LYING ON BACK TO SITTING ON SIDE OF FLAT BED WITH BEDRAILS: A LOT
DRESSING REGULAR UPPER BODY CLOTHING: A LOT
MOVING TO AND FROM BED TO CHAIR: A LOT
CLIMB 3 TO 5 STEPS WITH RAILING: TOTAL
HELP NEEDED FOR BATHING: TOTAL
DAILY ACTIVITIY SCORE: 11
MOBILITY SCORE: 10
STANDING UP FROM CHAIR USING ARMS: A LOT
MOBILITY SCORE: 6
TOILETING: TOTAL
TURNING FROM BACK TO SIDE WHILE IN FLAT BAD: TOTAL
DAILY ACTIVITIY SCORE: 12
TOILETING: TOTAL
EATING MEALS: A LITTLE
TURNING FROM BACK TO SIDE WHILE IN FLAT BAD: A LOT

## 2024-10-25 ASSESSMENT — ACTIVITIES OF DAILY LIVING (ADL)
ADL_ASSISTANCE: NEEDS ASSISTANCE
LACK_OF_TRANSPORTATION: NO
LACK_OF_TRANSPORTATION: YES
ADL_ASSISTANCE: NEEDS ASSISTANCE
BATHING_ASSISTANCE: MAXIMAL

## 2024-10-25 ASSESSMENT — PAIN DESCRIPTION - LOCATION
LOCATION: OTHER (COMMENT)
LOCATION: LEG

## 2024-10-25 ASSESSMENT — PAIN SCALES - GENERAL
PAINLEVEL_OUTOF10: 3
PAINLEVEL_OUTOF10: 0 - NO PAIN
PAINLEVEL_OUTOF10: 0 - NO PAIN
PAINLEVEL_OUTOF10: 3
PAINLEVEL_OUTOF10: 3
PAINLEVEL_OUTOF10: 4
PAINLEVEL_OUTOF10: 4

## 2024-10-25 ASSESSMENT — PAIN DESCRIPTION - DESCRIPTORS: DESCRIPTORS: ACHING

## 2024-10-25 ASSESSMENT — PAIN - FUNCTIONAL ASSESSMENT
PAIN_FUNCTIONAL_ASSESSMENT: 0-10
PAIN_FUNCTIONAL_ASSESSMENT: WONG-BAKER FACES
PAIN_FUNCTIONAL_ASSESSMENT: 0-10

## 2024-10-25 ASSESSMENT — LIFESTYLE VARIABLES
AUDIT-C TOTAL SCORE: 0
SKIP TO QUESTIONS 9-10: 1

## 2024-10-25 NOTE — H&P
History Of Present Illness  Marianela Green is a 74 y.o. female presenting with generalized malaise and fatigue.  Apparently patient had a fall a week ago.  Since then just has no energy.  She does have a home health care nurse who comes and checks on her.  Patient has not seen the primary care physician for 2 years.  She states she does not have a ride.  She gets Meals on Wheels.  Her daughter provides her minimal care.  She did have disheveled appearance on presentation.  She was contaminated with urine and feces.  She has overgrown nails very unkept.  She is not a very good historian.  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     Social History  She reports that she has been smoking cigarettes. She has never used smokeless tobacco. She reports that she does not drink alcohol and does not use drugs.    Family History  No family history on file.     Allergies  Patient has no known allergies.    Review of Systems   Constitutional:  Negative for activity change, appetite change, chills, diaphoresis, fatigue, fever and unexpected weight change.   HENT:  Negative for congestion, dental problem, drooling, ear discharge, ear pain, facial swelling, hearing loss, mouth sores, nosebleeds, postnasal drip, rhinorrhea, sinus pressure, sinus pain, sneezing, sore throat, tinnitus, trouble swallowing and voice change.    Eyes:  Negative for photophobia, pain, discharge, redness, itching and visual disturbance.   Respiratory:  Negative for apnea, cough, choking, chest tightness, shortness of breath, wheezing and stridor.    Cardiovascular:  Negative for chest pain, palpitations and leg swelling.   Gastrointestinal:  Negative for abdominal distention, abdominal pain, anal bleeding, blood in stool,  constipation, diarrhea, nausea, rectal pain and vomiting.   Endocrine: Negative for cold intolerance, heat intolerance, polydipsia, polyphagia and polyuria.   Genitourinary:  Negative for decreased urine volume, difficulty urinating, dysuria, enuresis, flank pain, frequency, genital sores, hematuria and urgency.   Musculoskeletal:  Negative for arthralgias, back pain, gait problem, joint swelling, myalgias, neck pain and neck stiffness.   Skin:  Negative for color change, pallor, rash and wound.   Allergic/Immunologic: Negative for environmental allergies, food allergies and immunocompromised state.   Neurological:  Negative for dizziness, tremors, seizures, syncope, facial asymmetry, speech difficulty, weakness, light-headedness, numbness and headaches.   Hematological:  Negative for adenopathy. Does not bruise/bleed easily.   Psychiatric/Behavioral:  Negative for agitation, behavioral problems, confusion, decreased concentration, dysphoric mood, hallucinations, self-injury, sleep disturbance and suicidal ideas. The patient is not nervous/anxious and is not hyperactive.         Physical Exam  Vitals reviewed.   Constitutional:       Appearance: Normal appearance.   HENT:      Head: Normocephalic and atraumatic.      Right Ear: Tympanic membrane, ear canal and external ear normal.      Left Ear: Tympanic membrane, ear canal and external ear normal.      Nose: Nose normal.      Mouth/Throat:      Pharynx: Oropharynx is clear.   Eyes:      Extraocular Movements: Extraocular movements intact.      Conjunctiva/sclera: Conjunctivae normal.      Pupils: Pupils are equal, round, and reactive to light.   Cardiovascular:      Rate and Rhythm: Normal rate and regular rhythm.      Pulses: Normal pulses.      Heart sounds: Normal heart sounds.   Pulmonary:      Effort: Pulmonary effort is normal.      Breath sounds: Normal breath sounds.   Abdominal:      General: Abdomen is flat. Bowel sounds are normal.      Palpations:  "Abdomen is soft.   Musculoskeletal:      Cervical back: Normal range of motion and neck supple.   Skin:     General: Skin is warm and dry.      Findings: Erythema present.      Comments: There is no lower legs left more than right, fungal rash in groin area   Neurological:      General: No focal deficit present.      Mental Status: She is alert and oriented to person, place, and time.   Psychiatric:         Mood and Affect: Mood normal.          Last Recorded Vitals  Blood pressure 150/72, pulse 106, temperature 36.5 °C (97.7 °F), temperature source Oral, resp. rate 18, height 1.6 m (5' 3\"), weight 95.1 kg (209 lb 10.5 oz), SpO2 94%.    Relevant Results        Scheduled medications  docusate sodium, 100 mg, oral, BID  [START ON 10/25/2024] enoxaparin, 40 mg, subcutaneous, Daily  insulin lispro, 0-5 Units, subcutaneous, TID  [START ON 10/25/2024] pantoprazole, 40 mg, oral, Daily   Or  [START ON 10/25/2024] pantoprazole, 40 mg, intravenous, Daily  polyethylene glycol, 17 g, oral, Daily      Continuous medications     PRN medications  PRN medications: acetaminophen **OR** acetaminophen **OR** acetaminophen, benzocaine-menthol, dextromethorphan-guaifenesin, guaiFENesin, melatonin, ondansetron **OR** ondansetron  Results for orders placed or performed during the hospital encounter of 10/24/24 (from the past 24 hours)   ECG 12 lead   Result Value Ref Range    Ventricular Rate 108 BPM    Atrial Rate 108 BPM    NY Interval 196 ms    QRS Duration 60 ms    QT Interval 322 ms    QTC Calculation(Bazett) 431 ms    P Axis 14 degrees    R Axis -11 degrees    T Axis -14 degrees    QRS Count 18 beats    Q Onset 226 ms    P Onset 128 ms    P Offset 188 ms    T Offset 387 ms    QTC Fredericia 391 ms   CBC and Auto Differential   Result Value Ref Range    WBC 11.0 4.4 - 11.3 x10*3/uL    nRBC 0.0 0.0 - 0.0 /100 WBCs    RBC 4.99 4.00 - 5.20 x10*6/uL    Hemoglobin 14.6 12.0 - 16.0 g/dL    Hematocrit 43.0 36.0 - 46.0 %    MCV 86 80 - 100 " fL    MCH 29.3 26.0 - 34.0 pg    MCHC 34.0 32.0 - 36.0 g/dL    RDW 13.3 11.5 - 14.5 %    Platelets 361 150 - 450 x10*3/uL    Neutrophils % 76.7 40.0 - 80.0 %    Immature Granulocytes %, Automated 0.4 0.0 - 0.9 %    Lymphocytes % 12.9 13.0 - 44.0 %    Monocytes % 8.3 2.0 - 10.0 %    Eosinophils % 1.2 0.0 - 6.0 %    Basophils % 0.5 0.0 - 2.0 %    Neutrophils Absolute 8.41 (H) 1.60 - 5.50 x10*3/uL    Immature Granulocytes Absolute, Automated 0.04 0.00 - 0.50 x10*3/uL    Lymphocytes Absolute 1.41 0.80 - 3.00 x10*3/uL    Monocytes Absolute 0.91 (H) 0.05 - 0.80 x10*3/uL    Eosinophils Absolute 0.13 0.00 - 0.40 x10*3/uL    Basophils Absolute 0.06 0.00 - 0.10 x10*3/uL   Comprehensive metabolic panel   Result Value Ref Range    Glucose 218 (H) 74 - 99 mg/dL    Sodium 138 136 - 145 mmol/L    Potassium 3.4 (L) 3.5 - 5.3 mmol/L    Chloride 100 98 - 107 mmol/L    Bicarbonate 31 21 - 32 mmol/L    Anion Gap 10 10 - 20 mmol/L    Urea Nitrogen 9 6 - 23 mg/dL    Creatinine 0.56 0.50 - 1.05 mg/dL    eGFR >90 >60 mL/min/1.73m*2    Calcium 9.4 8.6 - 10.3 mg/dL    Albumin 3.8 3.4 - 5.0 g/dL    Alkaline Phosphatase 70 33 - 136 U/L    Total Protein 7.1 6.4 - 8.2 g/dL    AST 9 9 - 39 U/L    Bilirubin, Total 0.6 0.0 - 1.2 mg/dL    ALT 9 7 - 45 U/L   B-Type Natriuretic Peptide   Result Value Ref Range    BNP 39 0 - 99 pg/mL   Magnesium   Result Value Ref Range    Magnesium 1.96 1.60 - 2.40 mg/dL   Troponin I, High Sensitivity, Initial   Result Value Ref Range    Troponin I, High Sensitivity 10 0 - 13 ng/L   Troponin, High Sensitivity, 1 Hour   Result Value Ref Range    Troponin I, High Sensitivity 9 0 - 13 ng/L   Urinalysis with Reflex Culture and Microscopic   Result Value Ref Range    Color, Urine Light-Yellow Light-Yellow, Yellow, Dark-Yellow    Appearance, Urine Turbid (N) Clear    Specific Gravity, Urine 1.005 1.005 - 1.035    pH, Urine 6.0 5.0, 5.5, 6.0, 6.5, 7.0, 7.5, 8.0    Protein, Urine NEGATIVE NEGATIVE, 10 (TRACE), 20 (TRACE)  mg/dL    Glucose, Urine 50 (TRACE) (A) Normal mg/dL    Blood, Urine 0.03 (TRACE) (A) NEGATIVE    Ketones, Urine NEGATIVE NEGATIVE mg/dL    Bilirubin, Urine NEGATIVE NEGATIVE    Urobilinogen, Urine Normal Normal mg/dL    Nitrite, Urine NEGATIVE NEGATIVE    Leukocyte Esterase, Urine 500 Henok/µL (A) NEGATIVE   Microscopic Only, Urine   Result Value Ref Range    WBC, Urine >50 (A) 1-5, NONE /HPF    WBC Clumps, Urine FEW Reference range not established. /HPF    RBC, Urine >20 (A) NONE, 1-2, 3-5 /HPF    Squamous Epithelial Cells, Urine 1-9 (SPARSE) Reference range not established. /HPF    Bacteria, Urine 4+ (A) NONE SEEN /HPF   POCT GLUCOSE   Result Value Ref Range    POCT Glucose 165 (H) 74 - 99 mg/dL   POCT GLUCOSE   Result Value Ref Range    POCT Glucose 198 (H) 74 - 99 mg/dL     ECG 12 lead    Result Date: 10/24/2024  Sinus tachycardia Minimal voltage criteria for LVH, may be normal variant ( R in aVL ) Inferior infarct , age undetermined Anterior infarct , age undetermined Abnormal ECG    XR lumbar spine 2-3 views    Result Date: 10/24/2024  Interpreted By:  Stephane Tom, STUDY: XR LUMBAR SPINE 2-3 VIEWS; XR PELVIS 1-2 VIEWS; ;  10/24/2024 12:20 pm   INDICATION: Signs/Symptoms:fall, injury.     COMPARISON: None.   ACCESSION NUMBER(S): [id number]; [id number]   ORDERING CLINICIAN: ARACELIS DANIEL   FINDINGS: Frontal and lateral views of the lumbar spine and single view of the pelvis. There is mild grade 1 L3 retrolisthesis and mild grade 1 L4 anterolisthesis. There are 5 non rib-bearing lumbar segments. There is no evident acute fracture, or suspicious osseous lesions. There are scattered degenerative changes. There are scattered arterial vascular calcifications. Densities right upper abdomen compatible with gallstones.       No detected acute fracture.   Scattered degenerative changes.   Cholelithiasis.     MACRO: None   Signed by: Stephane Tom 10/24/2024 12:36 PM Dictation workstation:   [id number]    XR  pelvis 1-2 views    Result Date: 10/24/2024  Interpreted By:  Stephane Tom, STUDY: XR LUMBAR SPINE 2-3 VIEWS; XR PELVIS 1-2 VIEWS; ;  10/24/2024 12:20 pm   INDICATION: Signs/Symptoms:fall, injury.     COMPARISON: None.   ACCESSION NUMBER(S): [id number]; [id number]   ORDERING CLINICIAN: ARACELIS DANIEL   FINDINGS: Frontal and lateral views of the lumbar spine and single view of the pelvis. There is mild grade 1 L3 retrolisthesis and mild grade 1 L4 anterolisthesis. There are 5 non rib-bearing lumbar segments. There is no evident acute fracture, or suspicious osseous lesions. There are scattered degenerative changes. There are scattered arterial vascular calcifications. Densities right upper abdomen compatible with gallstones.       No detected acute fracture.   Scattered degenerative changes.   Cholelithiasis.     MACRO: None   Signed by: Stephane Tom 10/24/2024 12:36 PM Dictation workstation:   [id number]    XR chest 1 view    Result Date: 10/24/2024  Interpreted By:  Stephane Tom, STUDY: XR CHEST 1 VIEW;  10/24/2024 12:20 pm   INDICATION: Signs/Symptoms:fatigue, weakness.     COMPARISON: January 31, 2008 chest radiographs and February 8, 2007 chest radiographs   ACCESSION NUMBER(S): [id number]   ORDERING CLINICIAN: ARACELIS DANIEL   FINDINGS: AP radiograph of the chest was provided.   Overlapping radiopaque leads   CARDIOMEDIASTINAL SILHOUETTE: There is a smoothly marginated lobulated density left lower chest medial adjacent to the spine indeterminate significance, although potentially related to hiatal hernia. Otherwise similar cardiomediastinal silhouette.   LUNGS: Lungs are clear.   ABDOMEN: No remarkable upper abdominal findings.   BONES: No acute osseous changes.       1.  Newly seen leftward paravertebral density is indeterminate. Attention to this region recommended on follow-up assessment. If not previously evaluated recommend further evaluation such as CT examination.       MACRO: None    Signed by: Stephane Tom 10/24/2024 12:34 PM Dictation workstation:   [id number]        Assessment/Plan   Assessment & Plan  Frequent falls    Bilateral lower leg cellulitis    Candidiasis of skin    Elevated blood sugar level    Hypertension    Failure to thrive (child)    COPD (chronic obstructive pulmonary disease) (Multi)    Abnormal chest x-ray      Will start antibiotics  Nystatin for candidal infection  Do CT of the chest for abnormal chest x-ray  Check for diabetes with elevated blood sugar  Monitor blood pressure continues to be high will add medication  COPD stable  PT OT for frequent falls  Patient may need placement         I spent  minutes in the professional and overall care of this patient.      Shelbie Ling MD

## 2024-10-25 NOTE — PROGRESS NOTES
"Marianela Green is a 74 y.o. female on day 1 of admission presenting with Frequent falls.    Subjective   Patient is feeling better.  She is eating little better today.  Still remains weak and not able to ambulate on her own       Objective     Physical Exam  Vitals reviewed.   Constitutional:       Appearance: Normal appearance.   HENT:      Head: Normocephalic and atraumatic.      Right Ear: Tympanic membrane, ear canal and external ear normal.      Left Ear: Tympanic membrane, ear canal and external ear normal.      Nose: Nose normal.      Mouth/Throat:      Pharynx: Oropharynx is clear.   Eyes:      Extraocular Movements: Extraocular movements intact.      Conjunctiva/sclera: Conjunctivae normal.      Pupils: Pupils are equal, round, and reactive to light.   Cardiovascular:      Rate and Rhythm: Normal rate and regular rhythm.      Pulses: Normal pulses.      Heart sounds: Normal heart sounds.   Pulmonary:      Effort: Pulmonary effort is normal.      Breath sounds: Normal breath sounds.   Abdominal:      General: Abdomen is flat. Bowel sounds are normal.      Palpations: Abdomen is soft.   Musculoskeletal:      Cervical back: Normal range of motion and neck supple.   Skin:     General: Skin is warm and dry.      Findings: Rash present.      Comments: Erythema on both l lower legs   Fungal rash in skin fold   Neurological:      General: No focal deficit present.      Mental Status: She is alert and oriented to person, place, and time.   Psychiatric:         Mood and Affect: Mood normal.         Last Recorded Vitals  Blood pressure 143/85, pulse 90, temperature 36.4 °C (97.5 °F), temperature source Oral, resp. rate 17, height 1.6 m (5' 3\"), weight 95.1 kg (209 lb 10.5 oz), SpO2 96%.  Intake/Output last 3 Shifts:  I/O last 3 completed shifts:  In: 50 (0.5 mL/kg) [IV Piggyback:50]  Out: 250 (2.6 mL/kg) [Urine:250 (0.1 mL/kg/hr)]  Weight: 95.1 kg     Relevant Results               Scheduled " medications  cefTRIAXone, 1 g, intravenous, q24h  docusate sodium, 100 mg, oral, BID  enoxaparin, 40 mg, subcutaneous, Daily  insulin lispro, 0-5 Units, subcutaneous, TID  nystatin, , Topical, q8h  pantoprazole, 40 mg, oral, Daily   Or  pantoprazole, 40 mg, intravenous, Daily  polyethylene glycol, 17 g, oral, Daily      Continuous medications     PRN medications  PRN medications: acetaminophen **OR** acetaminophen **OR** acetaminophen, benzocaine-menthol, dextromethorphan-guaifenesin, guaiFENesin, melatonin, ondansetron **OR** ondansetron  Results for orders placed or performed during the hospital encounter of 10/24/24 (from the past 24 hours)   POCT GLUCOSE   Result Value Ref Range    POCT Glucose 198 (H) 74 - 99 mg/dL   CBC   Result Value Ref Range    WBC 9.7 4.4 - 11.3 x10*3/uL    nRBC 0.0 0.0 - 0.0 /100 WBCs    RBC 4.57 4.00 - 5.20 x10*6/uL    Hemoglobin 13.3 12.0 - 16.0 g/dL    Hematocrit 40.4 36.0 - 46.0 %    MCV 88 80 - 100 fL    MCH 29.1 26.0 - 34.0 pg    MCHC 32.9 32.0 - 36.0 g/dL    RDW 13.3 11.5 - 14.5 %    Platelets 383 150 - 450 x10*3/uL   Comprehensive metabolic panel   Result Value Ref Range    Glucose 167 (H) 74 - 99 mg/dL    Sodium 138 136 - 145 mmol/L    Potassium 3.7 3.5 - 5.3 mmol/L    Chloride 102 98 - 107 mmol/L    Bicarbonate 29 21 - 32 mmol/L    Anion Gap 11 10 - 20 mmol/L    Urea Nitrogen 6 6 - 23 mg/dL    Creatinine 0.54 0.50 - 1.05 mg/dL    eGFR >90 >60 mL/min/1.73m*2    Calcium 9.0 8.6 - 10.3 mg/dL    Albumin 3.3 (L) 3.4 - 5.0 g/dL    Alkaline Phosphatase 61 33 - 136 U/L    Total Protein 6.0 (L) 6.4 - 8.2 g/dL    AST 9 9 - 39 U/L    Bilirubin, Total 0.6 0.0 - 1.2 mg/dL    ALT 8 7 - 45 U/L   POCT GLUCOSE   Result Value Ref Range    POCT Glucose 169 (H) 74 - 99 mg/dL   POCT GLUCOSE   Result Value Ref Range    POCT Glucose 156 (H) 74 - 99 mg/dL   POCT GLUCOSE   Result Value Ref Range    POCT Glucose 172 (H) 74 - 99 mg/dL     CT chest w IV contrast    Result Date: 10/25/2024  Interpreted By:   Remi Bertrand, STUDY: CT CHEST W IV CONTRAST; ;  10/25/2024 7:50 am   INDICATION: Signs/Symptoms:Abnormal chest x-ray.     COMPARISON: None.   ACCESSION NUMBER(S): [id number]   ORDERING CLINICIAN: NICK SAMUELS   TECHNIQUE: Serial axial CT images obtained of the chest following intravenous administration of 75 mL of Omnipaque 350. Images reformatted in the coronal and sagittal projection   All CT examinations are performed with 1 or more of the following dose reduction techniques: Automated exposure control, adjustment of mA and/or kv according to patient's size, or use of iterative reconstruction techniques.   FINDINGS: Mediastinum demonstrates no lymphadenopathy. There is no hilar lymphadenopathy. Esophagus is unremarkable. Small sliding hiatal hernia demonstrated.   Heart and great vessels demonstrate ascending thoracic aorta to measure 3.4 cm. Main pulmonary artery is unremarkable. Central pulmonary arteries are unremarkable   Lung parenchyma demonstrates scattered pulmonary nodules within bilateral lung fields. For example, in the right middle lobe near the hemidiaphragm with 2 adjacent pulmonary nodules identified measuring 7 mm (series 7, image 140). Also, in the subpleural right lower lobe laterally with 2 mm nodule demonstrated (series 7, image 154). There is a 3 mm pulmonary nodule about the right hemidiaphragm (series 7, image 165). Other scattered 3-4 mm pulmonary nodules are demonstrated. Evaluation in the right middle lobe near the minor fissure demonstrates nodular density which is in the area of linear appearing scar measuring 7 mm (series 7, image 121). Left lung demonstrates a few scattered pulmonary nodules. For example, in the posterior segment of the left lower lobe identified measuring 3 mm   There is a area of fat density demonstrated at the right lung base posteriorly adjacent to the hemidiaphragm likely related to subtle Bochdalek's hernia   Included portions visualized abdomen demonstrates  cholelithiasis with scattered dependent stones in the gallbladder lumen.   Visualized osseous structures demonstrate multilevel anterior osteophyte formation throughout the visualized thoracolumbar spine. No compression deformity demonstrated. No significant narrowing of the spinal canal.               1. Pulmonary nodules within bilateral lung fields with scattered 7 mm pulmonary nodules in the right mid to lower lung zone. No prior examinations available for comparison. Incidental Finding:  Multiple solid non-calcified pulmonary nodules measuring up to 6-8 mm. Instructions:  Consider follow up non contrast chest CT at 3-6 months, then consider CT chest at 18-24 months. (Zeke Silveira et al., Guidelines for management of incidental pulmonary nodules detected on CT images: From the Fleischner Society 2017, Radiology. 2017 Jul;284 (1):228-243.) FLEISCHNER.[email or web address].3   2. Area of fat/fluid density along the right hemidiaphragm identified measuring 12 mm may be related to Bochdalek's hernia   3. Right basilar linear appearing scar     MACRO: None   Signed by: Remi Bertrand 10/25/2024 10:07 AM Dictation workstation:   [id number]    ECG 12 lead    Result Date: 10/24/2024  Sinus tachycardia Minimal voltage criteria for LVH, may be normal variant ( R in aVL ) Inferior infarct , age undetermined Anterior infarct , age undetermined Abnormal ECG    XR lumbar spine 2-3 views    Result Date: 10/24/2024  Interpreted By:  Stephane Tom, STUDY: XR LUMBAR SPINE 2-3 VIEWS; XR PELVIS 1-2 VIEWS; ;  10/24/2024 12:20 pm   INDICATION: Signs/Symptoms:fall, injury.     COMPARISON: None.   ACCESSION NUMBER(S): [id number]; [id number]   ORDERING CLINICIAN: ARACELIS DANIEL   FINDINGS: Frontal and lateral views of the lumbar spine and single view of the pelvis. There is mild grade 1 L3 retrolisthesis and mild grade 1 L4 anterolisthesis. There are 5 non rib-bearing lumbar segments. There is no evident acute fracture, or suspicious  osseous lesions. There are scattered degenerative changes. There are scattered arterial vascular calcifications. Densities right upper abdomen compatible with gallstones.       No detected acute fracture.   Scattered degenerative changes.   Cholelithiasis.     MACRO: None   Signed by: Stephane Tom 10/24/2024 12:36 PM Dictation workstation:   [id number]    XR pelvis 1-2 views    Result Date: 10/24/2024  Interpreted By:  Stephane Tom, STUDY: XR LUMBAR SPINE 2-3 VIEWS; XR PELVIS 1-2 VIEWS; ;  10/24/2024 12:20 pm   INDICATION: Signs/Symptoms:fall, injury.     COMPARISON: None.   ACCESSION NUMBER(S): [id number]; [id number]   ORDERING CLINICIAN: ARACELIS DANIEL   FINDINGS: Frontal and lateral views of the lumbar spine and single view of the pelvis. There is mild grade 1 L3 retrolisthesis and mild grade 1 L4 anterolisthesis. There are 5 non rib-bearing lumbar segments. There is no evident acute fracture, or suspicious osseous lesions. There are scattered degenerative changes. There are scattered arterial vascular calcifications. Densities right upper abdomen compatible with gallstones.       No detected acute fracture.   Scattered degenerative changes.   Cholelithiasis.     MACRO: None   Signed by: Stephane Tom 10/24/2024 12:36 PM Dictation workstation:   [id number]    XR chest 1 view    Result Date: 10/24/2024  Interpreted By:  Stephane Tom, STUDY: XR CHEST 1 VIEW;  10/24/2024 12:20 pm   INDICATION: Signs/Symptoms:fatigue, weakness.     COMPARISON: January 31, 2008 chest radiographs and February 8, 2007 chest radiographs   ACCESSION NUMBER(S): [id number]   ORDERING CLINICIAN: ARACELIS DANIEL   FINDINGS: AP radiograph of the chest was provided.   Overlapping radiopaque leads   CARDIOMEDIASTINAL SILHOUETTE: There is a smoothly marginated lobulated density left lower chest medial adjacent to the spine indeterminate significance, although potentially related to hiatal hernia. Otherwise similar  cardiomediastinal silhouette.   LUNGS: Lungs are clear.   ABDOMEN: No remarkable upper abdominal findings.   BONES: No acute osseous changes.       1.  Newly seen leftward paravertebral density is indeterminate. Attention to this region recommended on follow-up assessment. If not previously evaluated recommend further evaluation such as CT examination.       MACRO: None   Signed by: Stephane Tom 10/24/2024 12:34 PM Dictation workstation:   [id number]                  Assessment/Plan   Assessment & Plan  Frequent falls    Bilateral lower leg cellulitis    Candidiasis of skin    Elevated blood sugar level    Hypertension    Failure to thrive (child)    COPD (chronic obstructive pulmonary disease) (Multi)    Abnormal chest x-ray    Leg cellulitis improving  Continue nystatin cream for the fungal rash  Blood pressure stable  COPD stable  CT lung shows.  Nodules which needs follow-up  Physical therapy recommended rehab  Plan for rehab when arrangements made       I spent 2 minutes in the professional and overall care of this patient.      Shelbie Ling MD

## 2024-10-25 NOTE — PROGRESS NOTES
"Nutrition Initial Assessment:   Nutrition Assessment    Reason for Assessment: Dietitian discretion    Patient is a 74 y.o. female admitted with generalized malaise and fatigue. PMH of HTN.     Nutrition History:  Food and Nutrient History: Attempted to call pt - busy tone. Per H&P, pt receives Meals on Wheels. 100% x 1 meal today.  Energy Intake: Good > 75 %     Food Allergies/Intolerances:   none noted  GI Symptoms:  none noted  Oral Problems:  none noted    Anthropometrics:  Ht: 160 cm (5' 3\"), Wt: 95.1 kg (209 lb 10.5 oz), BMI: 37.15  IBW/kg (Dietitian Calculated): 52.3 kg  Percent of IBW: 182 %       Weight History:  Daily Weight  10/24/24 : 95.1 kg (209 lb 10.5 oz)  05/17/22 : 90.3 kg (199 lb)  09/18/21 : 86.2 kg (190 lb)  05/06/20 : 82.6 kg (181 lb 15.8 oz)  02/13/20 : 84.4 kg (186 lb)  02/03/20 : 83.5 kg (184 lb)                   Nutrition Focused Physical Exam Findings:  defer: remote assessment  Edema:  Edema: +1 trace  Edema Location: Bilateral lower extremities  Physical Findings:  Skin: Positive (Wounds to left buttocks and left leg)    Nutrition Significant Labs:  BMP Trend:   Results from last 7 days   Lab Units 10/25/24  0542 10/24/24  1121   GLUCOSE mg/dL 167* 218*   CALCIUM mg/dL 9.0 9.4   SODIUM mmol/L 138 138   POTASSIUM mmol/L 3.7 3.4*   CO2 mmol/L 29 31   CHLORIDE mmol/L 102 100   BUN mg/dL 6 9   CREATININE mg/dL 0.54 0.56    , BG POCT trend:   Results from last 7 days   Lab Units 10/25/24  1137 10/25/24  0936 10/24/24  2025 10/24/24  1832   POCT GLUCOSE mg/dL 156* 169* 198* 165*        Medications:  cefTRIAXone, 1 g, intravenous, q24h  docusate sodium, 100 mg, oral, BID  enoxaparin, 40 mg, subcutaneous, Daily  insulin lispro, 0-5 Units, subcutaneous, TID  nystatin, , Topical, q8h  pantoprazole, 40 mg, oral, Daily   Or  pantoprazole, 40 mg, intravenous, Daily  polyethylene glycol, 17 g, oral, Daily           I/O:    ;      Dietary Orders (From admission, onward)       Start     Ordered    " 10/24/24 1812  Adult diet Consistent Carb; CCD 60 gm/meal  Diet effective now        Question Answer Comment   Diet type Consistent Carb    Carb diet selection: CCD 60 gm/meal        10/24/24 1811    10/24/24 1752  May Participate in Room Service  ( ROOM SERVICE MAY PARTICIPATE)  Once        Question:  .  Answer:  Yes    10/24/24 1751                     Estimated Needs:   Estimated Energy Needs  Total Energy Estimated Needs (kCal): 1569 kCal  Total Estimated Energy Need per Day (kCal/kg): 30 kCal/kg  Method for Estimating Needs: IBW    Estimated Protein Needs  Total Protein Estimated Needs (g): 78 g  Total Protein Estimated Needs (g/kg): 1.5 g/kg  Method for Estimating Needs: IBW    Estimated Fluid Needs  Total Fluid Estimated Needs (mL): 1569 mL  Total Fluid Estimated Needs (mL/kg): 30 mL/kg  Method for Estimating Needs: IBW        Nutrition Diagnosis   Malnutrition Diagnosis  Patient has Malnutrition Diagnosis: No    Nutrition Diagnosis  Patient has Nutrition Diagnosis: Yes  Diagnosis Status (1): New  Nutrition Diagnosis 1: Increased nutrient needs  Related to (1): wound healing  As Evidenced by (1): wounds to buttocks and left leg       Nutrition Interventions/Recommendations   Nutrition Prescription:  Individualized Nutrition Prescription Provided for : Recommend 8905-1657 kcal and 62-78 gm protein daily via PO intakes    Nutrition Interventions:   Food and/or Nutrient Delivery Interventions  Interventions: Meals and snacks, Vitamin supplement therapy  Meals and Snacks: Carbohydrate-modified diet  Goal: Continue current diet as ordered  Goal: Recommend multi-vitamin with mineral to aid wound healing    Education Documentation  No documentation found.           Nutrition Monitoring and Evaluation   Food/Nutrient Related History Monitoring  Monitoring and Evaluation Plan: Amount of food  Amount of Food: Estimated amout of food  Criteria: Continue to meet 100% of estimated needs via PO intakes    Body  Composition/Growth/Weight History  Monitoring and Evaluation Plan: Weight  Weight: Measured weight  Criteria: Maintain stable weight (+/- 3 kg)    Biochemical Data, Medical Tests and Procedures  Monitoring and Evaluation Plan: Electrolyte/renal panel    Nutrition Focused Physical Findings  Monitoring and Evaluation Plan: Skin  Criteria: Monitor skin integrity         Time Spent/Follow-up:   Follow Up  Time Spent (min): 30 minutes  Last Date of Nutrition Visit: 10/25/24  Nutrition Follow-Up Needed?: 5-7 days  Follow up Comment: 10/30-11/1    10/25/24 at 2:14 PM - JEAN-PIERRE SILVER RDN, LD

## 2024-10-25 NOTE — PROGRESS NOTES
Physical Therapy    Physical Therapy Evaluation    Patient Name: Marianela Green  MRN: [id number]  Department: [address]  Room: [facility]-  Today's Date: 10/25/2024   Time Calculation  Start Time: 0957  Stop Time: 1024  Time Calculation (min): 27 min    Assessment/Plan   PT Assessment  PT Assessment Results: Decreased strength, Decreased endurance, Impaired balance, Decreased mobility, Decreased safety awareness, Decreased skin integrity, Obesity  Rehab Prognosis: Fair  Barriers to Discharge: home alone, not able to care for self, max A x 2 for limited mobility  Evaluation/Treatment Tolerance: Patient limited by fatigue  Medical Staff Made Aware: Yes  Strengths: Ability to acquire knowledge  Barriers to Participation: Comorbidities, Coping skills  End of Session Communication: Bedside nurse  Assessment Comment: Patient presents with B LE weakness, impaired mobility, impaired functional activity tolerance, impaired balance. Patient has had a significant functional decline and was not thriving well at home. Patient requires acute PT to address functional defictis.  End of Session Patient Position: Bed, 3 rail up, Alarm on  IP OR SWING BED PT PLAN  Inpatient or Swing Bed: Inpatient  PT Plan  Treatment/Interventions: Bed mobility, Transfer training, Gait training, Endurance training, Therapeutic activity, Therapeutic exercise, Postural re-education  PT Plan: Ongoing PT  PT Frequency: 4 times per week  PT Discharge Recommendations: Moderate intensity level of continued care  Equipment Recommended upon Discharge: Wheeled walker, Wheelchair  PT Recommended Transfer Status: Total assist  PT - OK to Discharge: Yes    Subjective   General Visit Information:  General  Reason for Referral: Impaired mobility, frequent falls  Referred By: Dr. NICHOLAS Ling  Past Medical History Relevant to Rehab: ashtma, HTN, brain tumor  Family/Caregiver Present: No  Co-Treatment: OT  Co-Treatment Reason: Physical complexity and limited activity  tolerance  Prior to Session Communication: Bedside nurse  Patient Position Received: Bed, 3 rail up, Alarm on  Preferred Learning Style: auditory, verbal  General Comment: 74-year-old female presenting to the emergency department for evaluation of generalized malaise and fatigue.  The patient reportedly did have a fall about 1 week ago according to her daughter.  The patient states that she just does not have any energy.  Home Living:  Home Living  Type of Home: House  Lives With: Alone  Home Adaptive Equipment: Walker rolling or standard  Home Layout: One level  Home Access: No concerns  Home Living Comments: Patient reports only using a Depends and has assist to change, does not use bathroom.  Prior Level of Function:  Prior Function Per Pt/Caregiver Report  Level of [place]: Needs assistance with ADLs, Needs assistance with homemaking, Needs assistance with functional transfers  Receives Help From:  (2 dtrs, home health nurse, Meals on Wheels)  ADL Assistance: Needs assistance  Prior Function Comments: Patient reports that she stays in her rocking chair, has not ambulated in months, does not use the toilet but uses a Depends and has care aides change her. She does not shower herself. Has Meals on Wheels for food.  Precautions:  Precautions  Hearing/Visual Limitations: hearing is WFL  Medical Precautions: Fall precautions        Objective   Pain:  Pain Assessment  Pain Assessment: 0-10  0-10 (Numeric) Pain Score: 4  Pain Type: Acute pain  Pain Location: Knee  Pain Orientation: Left  Pain Interventions: Repositioned  Cognition:  Cognition  Overall Cognitive Status: Within Functional Limits  Orientation Level: Oriented X4  Insight: Mild    General Assessments:  General Observation  General Observation: Morbidly obese, appears unkempt, B hands fingernails and B feet toenails very long. Needs assist to manage. Patient found in deplorable condition at home, covered in urine and feces.  RN reports wound on  buttocks.     Activity Tolerance  Endurance: Decreased tolerance for upright activites  Activity Tolerance Comments: Decreased functional activity tolerance    Sensation  Light Touch: No apparent deficits  Sensation Comment: responds to light touch    Strength  Strength Comments: B LE hip, knee, ankle 2+/5  Coordination  Movements are Fluid and Coordinated: No  Lower Body Coordination: impaired due to weakness  Coordination Comment: slow, effortful movements    Postural Control  Postural Control: Impaired  Posture Comment: flexed at hips and knees, painful L knee    Static Sitting Balance  Static Sitting-Balance Support: Bilateral upper extremity supported, Feet supported  Static Sitting-Level of Assistance: Close supervision  Static Sitting-Comment/Number of Minutes: sits at edge of bed for about 8 min    Static Standing Balance  Static Standing-Balance Support: Bilateral upper extremity supported  Static Standing-Level of Assistance: Maximum assistance (x 2)  Static Standing-Comment/Number of Minutes: static stand for about 30 sec  Functional Assessments:  Bed Mobility  Bed Mobility: Yes  Bed Mobility 1  Bed Mobility 1: Supine to sitting, Sitting to supine  Level of Assistance 1: Maximum assistance, +2  Bed Mobility Comments 1: assist for trunk up/down and B LE in/out. Patient makes some effort to bring B LE to edge of bed    Transfers  Transfer: Yes  Transfer 1  Transfer From 1: Bed to  Transfer to 1: Stand  Technique 1: Sit to stand, Stand to sit  Transfer Device 1: Walker  Transfer Level of Assistance 1: Maximum assistance, +2, Moderate verbal cues  Trials/Comments 1: patient able to just clear buttocks from bed, is forward flexed at hips and flexed at knees in standing. Able to stand for about 30 sec. Very effortful for patient.    Ambulation/Gait Training  Ambulation/Gait Training Performed: No (not safe to attempt)  Extremity/Trunk Assessments:     Outcome Measures:  [facility] Basic Mobility  Turning from your  back to your side while in a flat bed without using bedrails: Total  Moving from lying on your back to sitting on the side of a flat bed without using bedrails: Total  Moving to and from bed to chair (including a wheelchair): Total  Standing up from a chair using your arms (e.g. wheelchair or bedside chair): Total  To walk in hospital room: Total  Climbing 3-5 steps with railing: Total  Basic Mobility - Total Score: 6    Encounter Problems       Encounter Problems (Active)       PT Goals       Patient will transfer supine to sit and sit to supine with minimal assist to facilitate mobility.  (Progressing)       Start:  10/25/24    Expected End:  11/08/24            Patient will transfer sit to stand and stand to sit with  moderate assist to facilitate mobility.  (Progressing)       Start:  10/25/24    Expected End:  11/08/24            Patient will transfer bed to chair and chair to bed with moderate assist to facilitate mobility.  (Progressing)       Start:  10/25/24    Expected End:  11/08/24            Patient will amb 15 feet with rolling walker device including no turns on even surface with moderate assist x 2 with wheelchair follow  to facilitate safe mobility.  (Progressing)       Start:  10/25/24    Expected End:  11/08/24            Patient will increase B LE strength to 3+/5 to improve functional mobility.    (Progressing)       Start:  10/25/24    Expected End:  11/08/24                   Pain - Adult              Education Documentation  Mobility Training, taught by Casandra Wen, PT at 10/25/2024 11:36 AM.  Learner: Patient  Readiness: Acceptance  Method: Explanation  Response: Verbalizes Understanding, Needs Reinforcement  Comment: Education re: safe mobility, importance of OOB activity

## 2024-10-25 NOTE — NURSING NOTE
Spoke with Daughter Елена she lives in [place]. Worried about her home going situation stating she needs home health with visits from home health aide. States she is not ready for a facility at this point. Would like to be contacted by case management.

## 2024-10-25 NOTE — PROGRESS NOTES
10/25/24 1521   Physical Activity   On average, how many days per week do you engage in moderate to strenuous exercise (like a brisk walk)? 0 days   On average, how many minutes do you engage in exercise at this level? 0 min   Financial Resource Strain   How hard is it for you to pay for the very basics like food, housing, medical care, and heating? Not hard   Housing Stability   In the last 12 months, was there a time when you were not able to pay the mortgage or rent on time? N   In the past 12 months, how many times have you moved where you were living? 0   At any time in the past 12 months, were you homeless or living in a shelter (including now)? N   Transportation Needs   In the past 12 months, has lack of transportation kept you from medical appointments or from getting medications? yes  (pt said that she has not seen a Dr. in 2 yrs because she cannot get out of the house. She is chair bound)   In the past 12 months, has lack of transportation kept you from meetings, work, or from getting things needed for daily living? Yes   Food Insecurity   Within the past 12 months, you worried that your food would run out before you got the money to buy more. Never true  (pt said that she has meals on wheels and her dtr Bridget does her shopping)   Within the past 12 months, the food you bought just didn't last and you didn't have money to get more. Never true   Stress   Do you feel stress - tense, restless, nervous, or anxious, or unable to sleep at night because your mind is troubled all the time - these days? Not at all   Social Connections   In a typical week, how many times do you talk on the phone with family, friends, or neighbors? Twice a week   How often do you get together with friends or relatives? Never  ( sees her daily)   How often do you attend Mormonism or Gnosticist services? Never   Do you belong to any clubs or organizations such as Mormonism groups, unions, fraternal or athletic  groups, or school groups? No   How often do you attend meetings of the clubs or organizations you belong to? Never   Are you , , , , never , or living with a partner?    Intimate Partner Violence   Within the last year, have you been afraid of your partner or ex-partner? No   Within the last year, have you been humiliated or emotionally abused in other ways by your partner or ex-partner? No   Within the last year, have you been kicked, hit, slapped, or otherwise physically hurt by your partner or ex-partner? No   Within the last year, have you been raped or forced to have any kind of sexual activity by your partner or ex-partner? No   Alcohol Use   Q1: How often do you have a drink containing alcohol? Never   Q2: How many drinks containing alcohol do you have on a typical day when you are drinking? None   Q3: How often do you have six or more drinks on one occasion? Never   Utilities   In the past 12 months has the electric, gas, oil, or water company threatened to shut off services in your home? No   Health Literacy   How often do you need to have someone help you when you read instructions, pamphlets, or other written material from your doctor or pharmacy? Never

## 2024-10-25 NOTE — CARE PLAN
"Pt said that her 2 dtrs Bridget and Maria Del Carmen are her POA's--documents are not on file  ADOD: 3 days    Pt lives alone in a 1 story home; no stairs to climb into the home or in the home itself  Pt said that she is chairbound. She is not able to go to the toilet, shower, or change her clothes.  Pt said that her dtr Bridget buys her groceries and pays her bills  Per notes by Physician. Pt was very unclean--stool and urine.  Pt receives meals on wheels daily.  Discussed discharge planning with pt; she is in agreement to go to a SNF and she gave this care coordinator permission to call her dtr Bridget to discuss discharge planning  Phoned Bridget at [phone]. Per Bridget, her mother has not been able to walk since the beginning of the month, maybe before that.  Bridget had ankle surgery and was not able to go to her mothers home. She was visiting her mother twice a month to do the housework, shopping, and pay the bills. A neighbor was supposed to go to the home to assiste Marianela with changing her diaper and housework and care for the cats. The neighbor has not been doing it  Per Bridget, her sister (pts other dtr) Maria Del Carmen is coming to town in 1 week; they are going to discuss long term plans for Marianela. Bridget did confirm that her mother has money to pay private duty.  List of SNF emailed to Bridget at [email or web address]; Bridget will share the list with her sister Maria Del Carmen and they will choose 4 facilities.  Pt will need a precert. PT assessed pt as Mod and AMPAC is \"6\"    DISCHARGE PLAN: SNF--DO NOT DISCHARGE PATIENT BEFORE SPEAKING WITH CARE COORDINATION; NEED SNF CHOICES, NEED ACCEPTING FACILITY; NEED PRECERT. GOLDENROD AND 7000 MUST BE COMPLETED PRIOR TO DISCHARGE.    "

## 2024-10-25 NOTE — PROGRESS NOTES
Occupational Therapy    Evaluation    Patient Name: Marianela Green  MRN: [id number]  Department: [address]  Room: [address] [place] [address]  Today's Date: 10/25/2024  Time Calculation  Start Time: 0958  Stop Time: 1024  Time Calculation (min): 26 min        Assessment:  OT Assessment: Pt presents on eval with increased overall weakness, cognitive/psychosocial deficits noted, poor activity tolerance, and poor standing balance affecting her self-care and functional transfers. Pt will benefit from continued skilled OT to address these deficits.  Prognosis: Fair  Barriers to Discharge: Decreased caregiver support  Evaluation/Treatment Tolerance: Patient limited by fatigue, Patient limited by pain  End of Session Communication: Bedside nurse  End of Session Patient Position: Bed, 3 rail up, Alarm on (Needs in reach and pt setup with her breakfast with the bed put into a chair position.)  OT Assessment Results: Decreased ADL status, Decreased upper extremity strength, Decreased safe judgment during ADL, Decreased cognition, Decreased endurance, Decreased functional mobility, Decreased gross motor control, Decreased trunk control for functional activities    Plan:  Treatment Interventions: ADL retraining, Functional transfer training, UE strengthening/ROM, Endurance training, Patient/family training, Equipment evaluation/education, Neuromuscular reeducation, Compensatory technique education  OT Frequency: 4 times per week  OT Discharge Recommendations: Moderate intensity level of continued care, Other (Comment) (It appears pt has very unsafe and deplorable conditions at home and LTC placement would highly be recommended long term for her safety. Pt would benefit from APS if she returns home.)  OT Recommended Transfer Status: Assist of 2  OT - OK to Discharge: Yes      Subjective     General:  General  Reason for Referral: Impaired ADL's/mobility, frequent falls  Referred By: Dr. NICHOLAS Ling  Past Medical History Relevant to Rehab: asthma, HTN,  "brain tumor  Family/Caregiver Present: No  Co-Treatment: PT  Co-Treatment Reason: Physical complexity and limited activity tolerance  Prior to Session Communication: Bedside nurse  Patient Position Received: Bed, 3 rail up, Alarm on  General Comment: 74-year-old female presenting to the emergency department for evaluation of generalized malaise and fatigue.  The patient reportedly did have a fall about 1 week ago according to her daughter.  The patient states that she just does not have any energy.    Precautions:  Medical Precautions: Fall precautions    Pain:  Pain Assessment  Pain Assessment: 0-10  0-10 (Numeric) Pain Score: 4  Pain Type: Acute pain  Pain Location: Knee  Pain Orientation: Left  Pain Interventions: Repositioned, Other (Comment) (Nurse aware.)    Objective     Cognition:  Overall Cognitive Status: Impaired  Orientation Level: Oriented X4  Safety Judgment: Decreased awareness of need for safety precautions  Problem Solving: Assistance required to generate solutions  Cognition Comments: Pt appears disheveled in appearance with overgrown fingernails, which are dirty, and notes in Epic state she arrived \"contaminated\" with urine and feces. Pt is most likely depressed and living in deplorable conditions alone at home.  Insight: Moderate    Home Living:  Type of Home: House  Lives With: Alone  Home Adaptive Equipment: Walker rolling or standard  Home Layout: One level  Home Access: Level entry  Bathroom Shower/Tub:  (Pt does not use the bathroom at home and reports \"using her diaper only\" to go to the bathroom.)    Prior Function:  Level of [place]: Needs assistance with ADLs, Needs assistance with homemaking, Needs assistance with functional transfers  Receives Help From: Home health  ADL Assistance: Needs assistance  Homemaking Assistance: Needs assistance  Ambulatory Assistance:  (Pt reports that she has not stood or ambulated in several months and basically lives in her rocking chair to eat, " sleep, and use the bathroom with a diaper.)  Hand Dominance: Right  Prior Function Comments: Pt describes deplorable conditions at home such as always being in her rocking chair and never getting up, going to the bathroom in her diaper and waiting for someone to clean/change her, only eating when [facility] meals arrive, not having her nails trimmed in a very long time, and not showering in a long time. Pt arrived to the hospital contaminated with urine and feces.    ADL:  Eating Assistance: Stand by  Eating Deficit: Setup  Grooming Assistance: Moderate  Bathing Assistance: Maximal  UE Dressing Assistance: Moderate  LE Dressing Assistance: Total  Toileting Assistance with Device: Total  Toileting Deficit: Incontinent    Activity Tolerance:  Activity Tolerance Comments: Poor    Bed Mobility/Transfers: Bed Mobility  Bed Mobility:  (Pt completed rolling in bed with mod A and supine<>sit with max A x2 for trunk up/down and moving her BLE's.)    Transfers  Transfer:  (Pt completed sit<>stand with max A x2 due to increased overall weakness, L knee pain, and poor standing balance.)    Functional Mobility:  Functional Mobility  Functional Mobility Performed: No    Sitting Balance:  Static Sitting Balance  Static Sitting-Balance Support: Bilateral upper extremity supported, Feet unsupported  Static Sitting-Level of Assistance: Minimum assistance, Contact guard    Standing Balance:  Static Standing Balance  Static Standing-Balance Support: Bilateral upper extremity supported  Static Standing-Level of Assistance: Maximum assistance (x2)     Sensation:  Sensation Comment: pt denies paresthesias    Strength:  Strength Comments: NIR's 4-/5    Coordination:  Coordination Comment: slow, effortful movements     Hand Function:  Gross Grasp: Functional  Coordination: Functional      Outcome Measures:[facility] Daily Activity  Putting on and taking off regular lower body clothing: Total  Bathing (including washing, rinsing, drying): A lot  Putting  on and taking off regular upper body clothing: A lot  Toileting, which includes using toilet, bedpan or urinal: Total  Taking care of personal grooming such as brushing teeth: A lot  Eating Meals: A little  Daily Activity - Total Score: 11      Education Documentation  Body Mechanics, taught by Dimas Robins Jr., OT at 10/25/2024 12:36 PM.  Learner: Patient  Readiness: Acceptance  Method: Explanation  Response: Needs Reinforcement    Home Exercise Program, taught by Dimas Robins Jr., OT at 10/25/2024 12:36 PM.  Learner: Patient  Readiness: Acceptance  Method: Explanation  Response: Needs Reinforcement    ADL Training, taught by Dimas Robins Jr., OT at 10/25/2024 12:36 PM.  Learner: Patient  Readiness: Acceptance  Method: Explanation  Response: Needs Reinforcement    Education Comments  No comments found.      Goals:  Encounter Problems       Encounter Problems (Active)       OT Goals       Pt will complete self-feeding with mod indep/indep. (Progressing)       Start:  10/25/24    Expected End:  11/25/24            Pt will complete all grooming tasks with setup while seated. (Progressing)       Start:  10/25/24    Expected End:  11/25/24            Pt will tolerate sitting EOB for at least 10-15 minutes during therex and/or ADL's. (Progressing)       Start:  10/25/24    Expected End:  11/25/24            Pt will complete all functional transfers from bed to wheelchair/chair/bedside commode with mod A using a device as recommended by skilled PT. (Progressing)       Start:  10/25/24    Expected End:  11/25/24            Pt will complete UB bathing with min A to setup. (Progressing)       Start:  10/25/24    Expected End:  11/25/24

## 2024-10-25 NOTE — PROGRESS NOTES
10/25/24 1523   Discharge Planning   Living Arrangements Alone   Support Systems Children   Type of Residence Private residence   Number of Stairs to Enter Residence 0   Number of Stairs Within Residence 0   Do you have animals or pets at home? No   Who is requesting discharge planning? Provider   Home or Post Acute Services Other (Comment);Post acute facilities (Rehab/SNF/etc)  (Pt has agreed to a SNF-- may need LTC)   Expected Discharge Disposition SNF   Does the patient need discharge transport arranged? Yes   RoundTrip coordination needed? Yes   Has discharge transport been arranged? No   Financial Resource Strain   How hard is it for you to pay for the very basics like food, housing, medical care, and heating? Not hard   Housing Stability   In the last 12 months, was there a time when you were not able to pay the mortgage or rent on time? N   In the past 12 months, how many times have you moved where you were living? 0   At any time in the past 12 months, were you homeless or living in a shelter (including now)? N   Transportation Needs   In the past 12 months, has lack of transportation kept you from medical appointments or from getting medications? no   In the past 12 months, has lack of transportation kept you from meetings, work, or from getting things needed for daily living? No   Patient Choice   Provider Choice list and CMS website (https://medicare.gov/care-compare#search) for post-acute Quality and Resource Measure Data were provided and reviewed with: Patient;Family   Patient / Family choosing to utilize agency / facility established prior to hospitalization Yes

## 2024-10-25 NOTE — CARE PLAN
The patient's goals for the shift include Maintain safety    The clinical goals for the shift include no fall or injury by the end of shift    Problem: Safety - Adult  Goal: Free from fall injury  Outcome: Progressing     Problem: Skin  Goal: Decreased wound size/increased tissue granulation at next dressing change  Outcome: Progressing     Problem: Skin  Goal: Participates in plan/prevention/treatment measures  Outcome: Progressing

## 2024-10-25 NOTE — CARE PLAN
Problem: Pain - Adult  Goal: Verbalizes/displays adequate comfort level or baseline comfort level  Outcome: Progressing   The patient's goals for the shift include Maintain safety    The clinical goals for the shift include no fall or injury by the end of shift

## 2024-10-25 NOTE — PROGRESS NOTES
10/25/24 1525   [facility] Disability Status   Are you deaf or do you have serious difficulty hearing? N   Are you blind or do you have serious difficulty seeing, even when wearing glasses? N   Because of a physical, mental, or emotional condition, do you have serious difficulty concentrating, remembering, or making decisions? (5 years old or older) Y   Do you have serious difficulty walking or climbing stairs? Y   Do you have serious difficulty dressing or bathing? Y   Because of a physical, mental, or emotional condition, do you have serious difficulty doing errands alone such as visiting the doctor? Y

## 2024-10-25 NOTE — CARE PLAN
Problem: Pain - Adult  Goal: Verbalizes/displays adequate comfort level or baseline comfort level  10/25/2024 1006 by Lula Cisneros RN  Outcome: Progressing  10/25/2024 1005 by Lula Cisneros RN  Outcome: Progressing  10/25/2024 0802 by Lula Cisneros, LEX  Outcome: Progressing   The patient's goals for the shift include Maintain safety    The clinical goals for the shift include safety, work with therapy

## 2024-10-26 PROBLEM — N39.0 UTI (URINARY TRACT INFECTION): Status: ACTIVE | Noted: 2024-10-26

## 2024-10-26 LAB
ANION GAP SERPL CALCULATED.3IONS-SCNC: 11 MMOL/L (ref 10–20)
BUN SERPL-MCNC: 6 MG/DL (ref 6–23)
CALCIUM SERPL-MCNC: 9 MG/DL (ref 8.6–10.3)
CHLORIDE SERPL-SCNC: 103 MMOL/L (ref 98–107)
CO2 SERPL-SCNC: 30 MMOL/L (ref 21–32)
CREAT SERPL-MCNC: 0.58 MG/DL (ref 0.5–1.05)
EGFRCR SERPLBLD CKD-EPI 2021: >90 ML/MIN/1.73M*2
ERYTHROCYTE [DISTWIDTH] IN BLOOD BY AUTOMATED COUNT: 13.2 % (ref 11.5–14.5)
GLUCOSE BLD MANUAL STRIP-MCNC: 138 MG/DL (ref 74–99)
GLUCOSE BLD MANUAL STRIP-MCNC: 144 MG/DL (ref 74–99)
GLUCOSE BLD MANUAL STRIP-MCNC: 190 MG/DL (ref 74–99)
GLUCOSE BLD MANUAL STRIP-MCNC: 208 MG/DL (ref 74–99)
GLUCOSE SERPL-MCNC: 151 MG/DL (ref 74–99)
HCT VFR BLD AUTO: 39.5 % (ref 36–46)
HGB BLD-MCNC: 13.4 G/DL (ref 12–16)
MCH RBC QN AUTO: 29.2 PG (ref 26–34)
MCHC RBC AUTO-ENTMCNC: 33.9 G/DL (ref 32–36)
MCV RBC AUTO: 86 FL (ref 80–100)
NRBC BLD-RTO: 0 /100 WBCS (ref 0–0)
PLATELET # BLD AUTO: 359 X10*3/UL (ref 150–450)
POTASSIUM SERPL-SCNC: 3.5 MMOL/L (ref 3.5–5.3)
RBC # BLD AUTO: 4.59 X10*6/UL (ref 4–5.2)
SODIUM SERPL-SCNC: 140 MMOL/L (ref 136–145)
WBC # BLD AUTO: 7.5 X10*3/UL (ref 4.4–11.3)

## 2024-10-26 PROCEDURE — 2500000002 HC RX 250 W HCPCS SELF ADMINISTERED DRUGS (ALT 637 FOR MEDICARE OP, ALT 636 FOR OP/ED): Performed by: INTERNAL MEDICINE

## 2024-10-26 PROCEDURE — 82947 ASSAY GLUCOSE BLOOD QUANT: CPT

## 2024-10-26 PROCEDURE — 1200000002 HC GENERAL ROOM WITH TELEMETRY DAILY

## 2024-10-26 PROCEDURE — 99233 SBSQ HOSP IP/OBS HIGH 50: CPT | Performed by: INTERNAL MEDICINE

## 2024-10-26 PROCEDURE — 85027 COMPLETE CBC AUTOMATED: CPT | Performed by: INTERNAL MEDICINE

## 2024-10-26 PROCEDURE — 2500000001 HC RX 250 WO HCPCS SELF ADMINISTERED DRUGS (ALT 637 FOR MEDICARE OP): Performed by: INTERNAL MEDICINE

## 2024-10-26 PROCEDURE — 36415 COLL VENOUS BLD VENIPUNCTURE: CPT | Performed by: INTERNAL MEDICINE

## 2024-10-26 PROCEDURE — 97535 SELF CARE MNGMENT TRAINING: CPT | Mod: GO,CO

## 2024-10-26 PROCEDURE — 2500000004 HC RX 250 GENERAL PHARMACY W/ HCPCS (ALT 636 FOR OP/ED): Performed by: INTERNAL MEDICINE

## 2024-10-26 PROCEDURE — 80048 BASIC METABOLIC PNL TOTAL CA: CPT | Performed by: INTERNAL MEDICINE

## 2024-10-26 RX ORDER — METOPROLOL SUCCINATE 50 MG/1
50 TABLET, EXTENDED RELEASE ORAL DAILY
Status: DISCONTINUED | OUTPATIENT
Start: 2024-10-26 | End: 2024-11-04 | Stop reason: HOSPADM

## 2024-10-26 ASSESSMENT — PAIN SCALES - GENERAL
PAINLEVEL_OUTOF10: 3
PAINLEVEL_OUTOF10: 3
PAINLEVEL_OUTOF10: 0 - NO PAIN
PAINLEVEL_OUTOF10: 3
PAINLEVEL_OUTOF10: 3
PAINLEVEL_OUTOF10: 0 - NO PAIN
PAINLEVEL_OUTOF10: 0 - NO PAIN

## 2024-10-26 ASSESSMENT — COGNITIVE AND FUNCTIONAL STATUS - GENERAL
MOBILITY SCORE: 10
MOVING FROM LYING ON BACK TO SITTING ON SIDE OF FLAT BED WITH BEDRAILS: A LOT
TURNING FROM BACK TO SIDE WHILE IN FLAT BAD: A LOT
HELP NEEDED FOR BATHING: TOTAL
WALKING IN HOSPITAL ROOM: TOTAL
STANDING UP FROM CHAIR USING ARMS: A LOT
MOVING TO AND FROM BED TO CHAIR: A LOT
DRESSING REGULAR LOWER BODY CLOTHING: A LOT
TOILETING: TOTAL
DRESSING REGULAR UPPER BODY CLOTHING: A LOT
DAILY ACTIVITIY SCORE: 12
HELP NEEDED FOR BATHING: A LOT
CLIMB 3 TO 5 STEPS WITH RAILING: TOTAL
DRESSING REGULAR UPPER BODY CLOTHING: A LOT
EATING MEALS: A LITTLE
PERSONAL GROOMING: A LITTLE
PERSONAL GROOMING: A LITTLE
DRESSING REGULAR LOWER BODY CLOTHING: TOTAL
TOILETING: TOTAL
EATING MEALS: A LITTLE
DAILY ACTIVITIY SCORE: 12

## 2024-10-26 ASSESSMENT — ACTIVITIES OF DAILY LIVING (ADL): HOME_MANAGEMENT_TIME_ENTRY: 39

## 2024-10-26 ASSESSMENT — PAIN DESCRIPTION - ORIENTATION
ORIENTATION: RIGHT;LEFT
ORIENTATION: RIGHT;LEFT
ORIENTATION: LEFT

## 2024-10-26 ASSESSMENT — PAIN DESCRIPTION - LOCATION
LOCATION: LEG
LOCATION: KNEE
LOCATION: LEG

## 2024-10-26 ASSESSMENT — PAIN - FUNCTIONAL ASSESSMENT
PAIN_FUNCTIONAL_ASSESSMENT: 0-10

## 2024-10-26 ASSESSMENT — PAIN DESCRIPTION - DESCRIPTORS
DESCRIPTORS: ACHING
DESCRIPTORS: ACHING

## 2024-10-26 NOTE — PROGRESS NOTES
"Marianela Green is a 74 y.o. female on day 2 of admission presenting with Frequent falls.    Subjective   Patient is feeling better.  She is eating little better today.  Still remains weak and not able to ambulate on her own.  Her blood pressure is running high patient has not taken blood pressure medication for a while.  Leg swelling and redness is doing better       Objective     Physical Exam  Vitals reviewed.   Constitutional:       Appearance: Normal appearance.   HENT:      Head: Normocephalic and atraumatic.      Right Ear: Tympanic membrane, ear canal and external ear normal.      Left Ear: Tympanic membrane, ear canal and external ear normal.      Nose: Nose normal.      Mouth/Throat:      Pharynx: Oropharynx is clear.   Eyes:      Extraocular Movements: Extraocular movements intact.      Conjunctiva/sclera: Conjunctivae normal.      Pupils: Pupils are equal, round, and reactive to light.   Cardiovascular:      Rate and Rhythm: Normal rate and regular rhythm.      Pulses: Normal pulses.      Heart sounds: Normal heart sounds.   Pulmonary:      Effort: Pulmonary effort is normal.      Breath sounds: Normal breath sounds.   Abdominal:      General: Abdomen is flat. Bowel sounds are normal.      Palpations: Abdomen is soft.   Musculoskeletal:      Cervical back: Normal range of motion and neck supple.   Skin:     General: Skin is warm and dry.      Comments:   Fungal rash in skin fold   Neurological:      General: No focal deficit present.      Mental Status: She is alert and oriented to person, place, and time.   Psychiatric:         Mood and Affect: Mood normal.         Last Recorded Vitals  Blood pressure 171/74, pulse 84, temperature 36.6 °C (97.9 °F), temperature source Oral, resp. rate 16, height 1.6 m (5' 3\"), weight 95.1 kg (209 lb 10.5 oz), SpO2 95%.  Intake/Output last 3 Shifts:  I/O last 3 completed shifts:  In: 290 (3 mL/kg) [P.O.:240; IV Piggyback:50]  Out: 2000 (21 mL/kg) [Urine:2000 (0.6 " mL/kg/hr)]  Weight: 95.1 kg     Relevant Results               Scheduled medications  cefTRIAXone, 1 g, intravenous, q24h  docusate sodium, 100 mg, oral, BID  enoxaparin, 40 mg, subcutaneous, Daily  insulin lispro, 0-5 Units, subcutaneous, TID  nystatin, , Topical, q8h  pantoprazole, 40 mg, oral, Daily   Or  pantoprazole, 40 mg, intravenous, Daily  polyethylene glycol, 17 g, oral, Daily      Continuous medications     PRN medications  PRN medications: acetaminophen **OR** acetaminophen **OR** acetaminophen, benzocaine-menthol, dextromethorphan-guaifenesin, guaiFENesin, melatonin, ondansetron **OR** ondansetron  Results for orders placed or performed during the hospital encounter of 10/24/24 (from the past 24 hours)   POCT GLUCOSE   Result Value Ref Range    POCT Glucose 150 (H) 74 - 99 mg/dL   CBC   Result Value Ref Range    WBC 7.5 4.4 - 11.3 x10*3/uL    nRBC 0.0 0.0 - 0.0 /100 WBCs    RBC 4.59 4.00 - 5.20 x10*6/uL    Hemoglobin 13.4 12.0 - 16.0 g/dL    Hematocrit 39.5 36.0 - 46.0 %    MCV 86 80 - 100 fL    MCH 29.2 26.0 - 34.0 pg    MCHC 33.9 32.0 - 36.0 g/dL    RDW 13.2 11.5 - 14.5 %    Platelets 359 150 - 450 x10*3/uL   Basic Metabolic Panel   Result Value Ref Range    Glucose 151 (H) 74 - 99 mg/dL    Sodium 140 136 - 145 mmol/L    Potassium 3.5 3.5 - 5.3 mmol/L    Chloride 103 98 - 107 mmol/L    Bicarbonate 30 21 - 32 mmol/L    Anion Gap 11 10 - 20 mmol/L    Urea Nitrogen 6 6 - 23 mg/dL    Creatinine 0.58 0.50 - 1.05 mg/dL    eGFR >90 >60 mL/min/1.73m*2    Calcium 9.0 8.6 - 10.3 mg/dL   POCT GLUCOSE   Result Value Ref Range    POCT Glucose 190 (H) 74 - 99 mg/dL   POCT GLUCOSE   Result Value Ref Range    POCT Glucose 138 (H) 74 - 99 mg/dL   POCT GLUCOSE   Result Value Ref Range    POCT Glucose 208 (H) 74 - 99 mg/dL     CT chest w IV contrast    Result Date: 10/25/2024  Interpreted By:  Remi Bertrand, STUDY: CT CHEST W IV CONTRAST; ;  10/25/2024 7:50 am   INDICATION: Signs/Symptoms:Abnormal chest x-ray.      COMPARISON: None.   ACCESSION NUMBER(S): [id number]   ORDERING CLINICIAN: NICK SAMUELS   TECHNIQUE: Serial axial CT images obtained of the chest following intravenous administration of 75 mL of Omnipaque 350. Images reformatted in the coronal and sagittal projection   All CT examinations are performed with 1 or more of the following dose reduction techniques: Automated exposure control, adjustment of mA and/or kv according to patient's size, or use of iterative reconstruction techniques.   FINDINGS: Mediastinum demonstrates no lymphadenopathy. There is no hilar lymphadenopathy. Esophagus is unremarkable. Small sliding hiatal hernia demonstrated.   Heart and great vessels demonstrate ascending thoracic aorta to measure 3.4 cm. Main pulmonary artery is unremarkable. Central pulmonary arteries are unremarkable   Lung parenchyma demonstrates scattered pulmonary nodules within bilateral lung fields. For example, in the right middle lobe near the hemidiaphragm with 2 adjacent pulmonary nodules identified measuring 7 mm (series 7, image 140). Also, in the subpleural right lower lobe laterally with 2 mm nodule demonstrated (series 7, image 154). There is a 3 mm pulmonary nodule about the right hemidiaphragm (series 7, image 165). Other scattered 3-4 mm pulmonary nodules are demonstrated. Evaluation in the right middle lobe near the minor fissure demonstrates nodular density which is in the area of linear appearing scar measuring 7 mm (series 7, image 121). Left lung demonstrates a few scattered pulmonary nodules. For example, in the posterior segment of the left lower lobe identified measuring 3 mm   There is a area of fat density demonstrated at the right lung base posteriorly adjacent to the hemidiaphragm likely related to subtle Bochdalek's hernia   Included portions visualized abdomen demonstrates cholelithiasis with scattered dependent stones in the gallbladder lumen.   Visualized osseous structures demonstrate  multilevel anterior osteophyte formation throughout the visualized thoracolumbar spine. No compression deformity demonstrated. No significant narrowing of the spinal canal.               1. Pulmonary nodules within bilateral lung fields with scattered 7 mm pulmonary nodules in the right mid to lower lung zone. No prior examinations available for comparison. Incidental Finding:  Multiple solid non-calcified pulmonary nodules measuring up to 6-8 mm. Instructions:  Consider follow up non contrast chest CT at 3-6 months, then consider CT chest at 18-24 months. (Zeke Silveira et al., Guidelines for management of incidental pulmonary nodules detected on CT images: From the Fleischner Society 2017, Radiology. 2017 Jul;284 (1):228-243.) [email or web address]   2. Area of fat/fluid density along the right hemidiaphragm identified measuring 12 mm may be related to Bochdalek's hernia   3. Right basilar linear appearing scar     MACRO: None   Signed by: Remi Bertrand 10/25/2024 10:07 AM Dictation workstation:   [id number]                  Assessment/Plan   Assessment & Plan  Frequent falls    Bilateral lower leg cellulitis    Candidiasis of skin    Elevated blood sugar level    Hypertension    Failure to thrive (child)    COPD (chronic obstructive pulmonary disease) (Multi)    Abnormal chest x-ray    UTI (urinary tract infection)    Leg cellulitis is almost resolved  Urine cultures growing more than 100,000 gram-negative baseline  Continue Rocephin pending cultures  continue nystatin cream for the fungal rash  Blood pressure running high  Add Toprol-XL 50 mg a day  COPD stable  Add A1c  Continue sliding scale insulin for elevated blood sugar  Patient's A1c was 7.9 4 years ago she has not been taking any diabetes medication  CT lung shows.  Nodules which needs follow-up  Physical therapy recommended rehab  Plan for rehab when arrangements made       I spent 2 minutes in the professional and overall care of this  patient.      Shelbie Ling MD

## 2024-10-26 NOTE — CARE PLAN
The patient's goals for the shift include Maintain safety    The clinical goals for the shift include rest      Problem: Pain - Adult  Goal: Verbalizes/displays adequate comfort level or baseline comfort level  Outcome: Progressing     Problem: Safety - Adult  Goal: Free from fall injury  Outcome: Progressing     Problem: Discharge Planning  Goal: Discharge to home or other facility with appropriate resources  Outcome: Progressing     Problem: Chronic Conditions and Co-morbidities  Goal: Patient's chronic conditions and co-morbidity symptoms are monitored and maintained or improved  Outcome: Progressing     Problem: Skin  Goal: Decreased wound size/increased tissue granulation at next dressing change  Outcome: Progressing  Flowsheets (Taken 10/26/2024 0935)  Decreased wound size/increased tissue granulation at next dressing change: Promote sleep for wound healing  Goal: Participates in plan/prevention/treatment measures  Outcome: Progressing  Flowsheets (Taken 10/26/2024 0935)  Participates in plan/prevention/treatment measures: Discuss with provider PT/OT consult  Goal: Prevent/manage excess moisture  Outcome: Progressing  Flowsheets (Taken 10/26/2024 0935)  Prevent/manage excess moisture: Monitor for/manage infection if present  Goal: Prevent/minimize sheer/friction injuries  Outcome: Progressing  Flowsheets (Taken 10/26/2024 0935)  Prevent/minimize sheer/friction injuries:   Increase activity/out of bed for meals   Use pull sheet  Goal: Promote/optimize nutrition  Outcome: Progressing  Flowsheets (Taken 10/26/2024 0935)  Promote/optimize nutrition: Monitor/record intake including meals  Goal: Promote skin healing  Outcome: Progressing  Flowsheets (Taken 10/26/2024 0935)  Promote skin healing: Protective dressings over bony prominences     Problem: Pain  Goal: Takes deep breaths with improved pain control throughout the shift  Outcome: Progressing  Goal: Turns in bed with improved pain control throughout the  shift  Outcome: Progressing  Goal: Walks with improved pain control throughout the shift  Outcome: Progressing  Goal: Performs ADL's with improved pain control throughout shift  Outcome: Progressing  Goal: Participates in PT with improved pain control throughout the shift  Outcome: Progressing  Goal: Free from opioid side effects throughout the shift  Outcome: Progressing  Goal: Free from acute confusion related to pain meds throughout the shift  Outcome: Progressing     Problem: Fall/Injury  Goal: Not fall by end of shift  Outcome: Progressing  Goal: Be free from injury by end of the shift  Outcome: Progressing  Goal: Verbalize understanding of personal risk factors for fall in the hospital  Outcome: Progressing  Goal: Verbalize understanding of risk factor reduction measures to prevent injury from fall in the home  Outcome: Progressing  Goal: Use assistive devices by end of the shift  Outcome: Progressing  Goal: Pace activities to prevent fatigue by end of the shift  Outcome: Progressing     Problem: Nutrition  Goal: Oral intake greater 75%  Outcome: Progressing

## 2024-10-26 NOTE — PROGRESS NOTES
Occupational Therapy    OT Treatment    Patient Name: Marianela Green  MRN: [id number]  Department: [address]  Room: [facility]  Today's Date: 10/26/2024  Time Calculation  Start Time: 0933  Stop Time: 1012  Time Calculation (min): 39 min        Assessment:  OT Assessment: Gradual progress made towards OT goals. Continue with current OT POC to increase strength, balance and functional tolerance to maximize safety and independence during ADLs.  Barriers to Discharge: Decreased caregiver support  Evaluation/Treatment Tolerance: Patient limited by fatigue  End of Session Communication: Bedside nurse  End of Session Patient Position: Bed, 3 rail up, Alarm on (all needs in reach)  OT Assessment Results: Decreased ADL status, Decreased upper extremity strength, Decreased safe judgment during ADL, Decreased cognition, Decreased endurance, Decreased functional mobility, Decreased gross motor control, Decreased trunk control for functional activities  Barriers to Discharge: Decreased caregiver support  Evaluation/Treatment Tolerance: Patient limited by fatigue  Plan:  Treatment Interventions: ADL retraining, Functional transfer training, UE strengthening/ROM, Endurance training, Patient/family training, Equipment evaluation/education, Neuromuscular reeducation, Compensatory technique education  OT Frequency: 4 times per week  OT Discharge Recommendations: Moderate intensity level of continued care  OT Recommended Transfer Status: Assist of 2  OT - OK to Discharge: Yes  Treatment Interventions: ADL retraining, Functional transfer training, UE strengthening/ROM, Endurance training, Patient/family training, Equipment evaluation/education, Neuromuscular reeducation, Compensatory technique education    Subjective   Previous Visit Info:  OT Last Visit  OT Received On: 10/26/24  General:  General  Reason for Referral: Impaired ADL's/mobility, frequent falls  Past Medical History Relevant to Rehab: asthma, HTN, brain tumor  Prior to  Session Communication: Bedside nurse  Patient Position Received: Bed, 3 rail up, Alarm on  General Comment: Pt cleared for OT session per nursing, cooperative this date. Pt soiled upon arrival requiring increased time and assistance for hygiene and bedding change.  Precautions:  Hearing/Visual Limitations: glasses  Medical Precautions: Fall precautions  Precautions Comment: [place], telemetry    Vital Signs (Past 2hrs)                 Pain:  Pain Assessment  Pain Assessment: 0-10  0-10 (Numeric) Pain Score: 3  Pain Type: Chronic pain  Pain Location: Leg  Pain Orientation: Left  Clinical Progression: Not changed  Pain Interventions: Repositioned, Ambulation/increased activity    Objective    Cognition:  Cognition  Overall Cognitive Status: Impaired  Orientation Level: Oriented X4  Safety Judgment: Decreased awareness of need for safety precautions  Safety/Judgement: Exceptions to WFL  Insight: Moderate  Impulsive: Mildly  Task Initiation: Initiates with cues  Processing Speed: Delayed  Coordination:  Movements are Fluid and Coordinated: No  Upper Body Coordination: slower rate and accuracy of movements  Lower Body Coordination: slower rate of movements  Activities of Daily Living: Grooming  Grooming Comments: oral hygiene and hair management tasks completed seated EOB with set-up and close supervision    UE Bathing  UE Bathing Comments: UB bathing tasks completed with Oniel while seated EOB, increased time and mod verbal cues required for sequencing.    LE Bathing  LE Bathing Comments: maxA required to complete LB bathing tasks while seated EOB.    UE Dressing  UE Dressing Comments: Oniel required to don/doff hospital gown while seated EOB.    Toileting  Toileting Comments: pt incontinent upon arrival requiring maxA to complete hygiene tasks at  bed level. Pt able to minimally assist with hygiene and bedding change by rolling R<>L in bed using bed rails.  Functional Standing Tolerance:     Bed Mobility/Transfers: Bed  Mobility  Bed Mobility: Yes  Bed Mobility 1  Bed Mobility 1: Supine to sitting, Sitting to supine, Scooting  Level of Assistance 1: Maximum assistance, +2  Bed Mobility Comments 1: increased time and assistance required to complete supine<>sit for trunk and BLE management. Scooting tasks completed with use of draw sheet in Trendelenburg. Verbal/ tactile cues required for sequencing to maximize pt potential for task completion.    Transfers  Transfer: No (further functional transfers deferred at this time secondary to pt fatigue.)    Therapy/Activity: Therapeutic Activity  Therapeutic Activity Performed: Yes  Therapeutic Activity 1: EOB sitting trials completed x15 min with close supervision to complete functional tasks.Fair+ sitting balance observed this date. EOB sitting trials completed to increase functional tolerance, strength and challenge sitting balance to maximize potential during ADLs.      Outcome Measures:[facility] Daily Activity  Putting on and taking off regular lower body clothing: Total  Bathing (including washing, rinsing, drying): A lot  Putting on and taking off regular upper body clothing: A lot  Toileting, which includes using toilet, bedpan or urinal: Total  Taking care of personal grooming such as brushing teeth: A little  Eating Meals: A little  Daily Activity - Total Score: 12        Education Documentation  Body Mechanics, taught by JANAY Ravi at 10/26/2024 12:39 PM.  Learner: Patient  Readiness: Acceptance  Method: Explanation, Demonstration  Response: Needs Reinforcement    ADL Training, taught by JANAY Ravi at 10/26/2024 12:39 PM.  Learner: Patient  Readiness: Acceptance  Method: Explanation, Demonstration  Response: Needs Reinforcement    Education Comments  Education provided on role of OT/POC, safety awareness throughout functional tasks/transfers, importance of activity/ rest routine, EC/WS techniques, and use of call light for assistance. Questions, comments and  concerns addressed regarding OT.      Goals:  Encounter Problems       Encounter Problems (Active)       OT Goals       Pt will complete self-feeding with mod indep/indep. (Progressing)       Start:  10/25/24    Expected End:  11/25/24            Pt will complete all grooming tasks with setup while seated. (Progressing)       Start:  10/25/24    Expected End:  11/25/24            Pt will tolerate sitting EOB for at least 10-15 minutes during therex and/or ADL's. (Progressing)       Start:  10/25/24    Expected End:  11/25/24            Pt will complete all functional transfers from bed to wheelchair/chair/bedside commode with mod A using a device as recommended by skilled PT. (Progressing)       Start:  10/25/24    Expected End:  11/25/24            Pt will complete UB bathing with min A to setup. (Progressing)       Start:  10/25/24    Expected End:  11/25/24

## 2024-10-26 NOTE — CARE PLAN
The patient's goals for the shift include Maintain safety    The clinical goals for the shift include safety, work with therapy      Problem: Pain - Adult  Goal: Verbalizes/displays adequate comfort level or baseline comfort level  Outcome: Progressing     Problem: Safety - Adult  Goal: Free from fall injury  Outcome: Progressing     Problem: Discharge Planning  Goal: Discharge to home or other facility with appropriate resources  Outcome: Progressing     Problem: Chronic Conditions and Co-morbidities  Goal: Patient's chronic conditions and co-morbidity symptoms are monitored and maintained or improved  Outcome: Progressing     Problem: Skin  Goal: Decreased wound size/increased tissue granulation at next dressing change  Outcome: Progressing  Goal: Participates in plan/prevention/treatment measures  Outcome: Progressing  Goal: Prevent/manage excess moisture  Outcome: Progressing  Goal: Prevent/minimize sheer/friction injuries  Outcome: Progressing  Goal: Promote/optimize nutrition  Outcome: Progressing  Goal: Promote skin healing  Outcome: Progressing     Problem: Pain  Goal: Takes deep breaths with improved pain control throughout the shift  Outcome: Progressing  Goal: Turns in bed with improved pain control throughout the shift  Outcome: Progressing  Goal: Walks with improved pain control throughout the shift  Outcome: Progressing  Goal: Performs ADL's with improved pain control throughout shift  Outcome: Progressing  Goal: Participates in PT with improved pain control throughout the shift  Outcome: Progressing  Goal: Free from opioid side effects throughout the shift  Outcome: Progressing  Goal: Free from acute confusion related to pain meds throughout the shift  Outcome: Progressing

## 2024-10-27 VITALS
HEART RATE: 72 BPM | TEMPERATURE: 98.8 F | OXYGEN SATURATION: 93 % | SYSTOLIC BLOOD PRESSURE: 157 MMHG | RESPIRATION RATE: 18 BRPM | HEIGHT: 63 IN | DIASTOLIC BLOOD PRESSURE: 67 MMHG | BODY MASS INDEX: 37.15 KG/M2 | WEIGHT: 209.66 LBS

## 2024-10-27 LAB
ANION GAP SERPL CALCULATED.3IONS-SCNC: 9 MMOL/L (ref 10–20)
BACTERIA UR CULT: ABNORMAL
BUN SERPL-MCNC: 7 MG/DL (ref 6–23)
CALCIUM SERPL-MCNC: 8.9 MG/DL (ref 8.6–10.3)
CHLORIDE SERPL-SCNC: 102 MMOL/L (ref 98–107)
CO2 SERPL-SCNC: 30 MMOL/L (ref 21–32)
CREAT SERPL-MCNC: 0.57 MG/DL (ref 0.5–1.05)
EGFRCR SERPLBLD CKD-EPI 2021: >90 ML/MIN/1.73M*2
ERYTHROCYTE [DISTWIDTH] IN BLOOD BY AUTOMATED COUNT: 13.2 % (ref 11.5–14.5)
EST. AVERAGE GLUCOSE BLD GHB EST-MCNC: 200 MG/DL
GLUCOSE BLD MANUAL STRIP-MCNC: 142 MG/DL (ref 74–99)
GLUCOSE BLD MANUAL STRIP-MCNC: 166 MG/DL (ref 74–99)
GLUCOSE BLD MANUAL STRIP-MCNC: 186 MG/DL (ref 74–99)
GLUCOSE BLD MANUAL STRIP-MCNC: 193 MG/DL (ref 74–99)
GLUCOSE SERPL-MCNC: 149 MG/DL (ref 74–99)
HBA1C MFR BLD: 8.6 %
HCT VFR BLD AUTO: 37.8 % (ref 36–46)
HGB BLD-MCNC: 12.9 G/DL (ref 12–16)
MCH RBC QN AUTO: 29.4 PG (ref 26–34)
MCHC RBC AUTO-ENTMCNC: 34.1 G/DL (ref 32–36)
MCV RBC AUTO: 86 FL (ref 80–100)
NRBC BLD-RTO: 0 /100 WBCS (ref 0–0)
PLATELET # BLD AUTO: 363 X10*3/UL (ref 150–450)
POTASSIUM SERPL-SCNC: 4 MMOL/L (ref 3.5–5.3)
RBC # BLD AUTO: 4.39 X10*6/UL (ref 4–5.2)
SODIUM SERPL-SCNC: 137 MMOL/L (ref 136–145)
WBC # BLD AUTO: 8.8 X10*3/UL (ref 4.4–11.3)

## 2024-10-27 PROCEDURE — 82374 ASSAY BLOOD CARBON DIOXIDE: CPT | Performed by: INTERNAL MEDICINE

## 2024-10-27 PROCEDURE — 83036 HEMOGLOBIN GLYCOSYLATED A1C: CPT | Mod: WESLAB | Performed by: INTERNAL MEDICINE

## 2024-10-27 PROCEDURE — 82947 ASSAY GLUCOSE BLOOD QUANT: CPT

## 2024-10-27 PROCEDURE — 1200000002 HC GENERAL ROOM WITH TELEMETRY DAILY

## 2024-10-27 PROCEDURE — 99233 SBSQ HOSP IP/OBS HIGH 50: CPT | Performed by: INTERNAL MEDICINE

## 2024-10-27 PROCEDURE — 2500000004 HC RX 250 GENERAL PHARMACY W/ HCPCS (ALT 636 FOR OP/ED): Performed by: INTERNAL MEDICINE

## 2024-10-27 PROCEDURE — 2500000001 HC RX 250 WO HCPCS SELF ADMINISTERED DRUGS (ALT 637 FOR MEDICARE OP): Performed by: INTERNAL MEDICINE

## 2024-10-27 PROCEDURE — 2500000002 HC RX 250 W HCPCS SELF ADMINISTERED DRUGS (ALT 637 FOR MEDICARE OP, ALT 636 FOR OP/ED): Performed by: INTERNAL MEDICINE

## 2024-10-27 PROCEDURE — 36415 COLL VENOUS BLD VENIPUNCTURE: CPT | Performed by: INTERNAL MEDICINE

## 2024-10-27 PROCEDURE — 85027 COMPLETE CBC AUTOMATED: CPT | Performed by: INTERNAL MEDICINE

## 2024-10-27 RX ORDER — LOSARTAN POTASSIUM 50 MG/1
50 TABLET ORAL DAILY
Status: DISCONTINUED | OUTPATIENT
Start: 2024-10-27 | End: 2024-10-28

## 2024-10-27 RX ORDER — METFORMIN HYDROCHLORIDE 500 MG/1
1000 TABLET, EXTENDED RELEASE ORAL
Status: DISCONTINUED | OUTPATIENT
Start: 2024-10-27 | End: 2024-10-28

## 2024-10-27 ASSESSMENT — COGNITIVE AND FUNCTIONAL STATUS - GENERAL
PERSONAL GROOMING: A LITTLE
MOVING FROM LYING ON BACK TO SITTING ON SIDE OF FLAT BED WITH BEDRAILS: A LOT
DRESSING REGULAR LOWER BODY CLOTHING: A LOT
DRESSING REGULAR UPPER BODY CLOTHING: A LOT
DAILY ACTIVITIY SCORE: 12
DAILY ACTIVITIY SCORE: 14
MOBILITY SCORE: 10
MOBILITY SCORE: 10
TURNING FROM BACK TO SIDE WHILE IN FLAT BAD: A LOT
EATING MEALS: A LITTLE
WALKING IN HOSPITAL ROOM: TOTAL
CLIMB 3 TO 5 STEPS WITH RAILING: TOTAL
WALKING IN HOSPITAL ROOM: TOTAL
DRESSING REGULAR LOWER BODY CLOTHING: A LOT
TOILETING: TOTAL
MOVING FROM LYING ON BACK TO SITTING ON SIDE OF FLAT BED WITH BEDRAILS: A LOT
HELP NEEDED FOR BATHING: A LOT
PERSONAL GROOMING: A LITTLE
MOVING TO AND FROM BED TO CHAIR: A LOT
MOVING TO AND FROM BED TO CHAIR: A LOT
HELP NEEDED FOR BATHING: TOTAL
DRESSING REGULAR UPPER BODY CLOTHING: A LOT
TURNING FROM BACK TO SIDE WHILE IN FLAT BAD: A LOT
STANDING UP FROM CHAIR USING ARMS: A LOT
STANDING UP FROM CHAIR USING ARMS: A LOT
CLIMB 3 TO 5 STEPS WITH RAILING: TOTAL
TOILETING: TOTAL

## 2024-10-27 ASSESSMENT — PAIN SCALES - GENERAL
PAINLEVEL_OUTOF10: 0 - NO PAIN
PAINLEVEL_OUTOF10: 0 - NO PAIN

## 2024-10-27 NOTE — NURSING NOTE
Assumed care.  Seen patient in high fowlers position on bed watching TV with no discomfort or distress noted.  Safety measures ensured and call light within reach.

## 2024-10-27 NOTE — PROGRESS NOTES
"Marianela Green is a 74 y.o. female on day 3 of admission presenting with Frequent falls.    Subjective   Patient is feeling better.  She is eating little better today.  Still remains weak and not able to ambulate on her own.  Her blood pressure is running high patient has not taken blood pressure medication for a while.  Leg swelling and redness is doing better       Objective     Physical Exam  Vitals reviewed.   Constitutional:       Appearance: Normal appearance.   HENT:      Head: Normocephalic and atraumatic.      Right Ear: Tympanic membrane, ear canal and external ear normal.      Left Ear: Tympanic membrane, ear canal and external ear normal.      Nose: Nose normal.      Mouth/Throat:      Pharynx: Oropharynx is clear.   Eyes:      Extraocular Movements: Extraocular movements intact.      Conjunctiva/sclera: Conjunctivae normal.      Pupils: Pupils are equal, round, and reactive to light.   Cardiovascular:      Rate and Rhythm: Normal rate and regular rhythm.      Pulses: Normal pulses.      Heart sounds: Normal heart sounds.   Pulmonary:      Effort: Pulmonary effort is normal.      Breath sounds: Normal breath sounds.   Abdominal:      General: Abdomen is flat. Bowel sounds are normal.      Palpations: Abdomen is soft.   Musculoskeletal:      Cervical back: Normal range of motion and neck supple.   Skin:     General: Skin is warm and dry.      Comments:   Fungal rash in skin fold   Neurological:      General: No focal deficit present.      Mental Status: She is alert and oriented to person, place, and time.   Psychiatric:         Mood and Affect: Mood normal.         Last Recorded Vitals  Blood pressure 169/64, pulse 68, temperature 36.8 °C (98.2 °F), temperature source Oral, resp. rate 18, height 1.6 m (5' 3\"), weight 95.1 kg (209 lb 10.5 oz), SpO2 95%.  Intake/Output last 3 Shifts:  I/O last 3 completed shifts:  In: - (0 mL/kg)   Out: 3100 (32.6 mL/kg) [Urine:3100 (0.9 mL/kg/hr)]  Weight: 95.1 kg "     Relevant Results               Scheduled medications  cefTRIAXone, 1 g, intravenous, q24h  docusate sodium, 100 mg, oral, BID  enoxaparin, 40 mg, subcutaneous, Daily  insulin lispro, 0-5 Units, subcutaneous, TID  metoprolol succinate XL, 50 mg, oral, Daily  nystatin, , Topical, q8h  pantoprazole, 40 mg, oral, Daily   Or  pantoprazole, 40 mg, intravenous, Daily  polyethylene glycol, 17 g, oral, Daily      Continuous medications     PRN medications  PRN medications: acetaminophen **OR** acetaminophen **OR** acetaminophen, benzocaine-menthol, dextromethorphan-guaifenesin, guaiFENesin, melatonin, ondansetron **OR** ondansetron  Results for orders placed or performed during the hospital encounter of 10/24/24 (from the past 24 hours)   POCT GLUCOSE   Result Value Ref Range    POCT Glucose 208 (H) 74 - 99 mg/dL   POCT GLUCOSE   Result Value Ref Range    POCT Glucose 144 (H) 74 - 99 mg/dL   CBC   Result Value Ref Range    WBC 8.8 4.4 - 11.3 x10*3/uL    nRBC 0.0 0.0 - 0.0 /100 WBCs    RBC 4.39 4.00 - 5.20 x10*6/uL    Hemoglobin 12.9 12.0 - 16.0 g/dL    Hematocrit 37.8 36.0 - 46.0 %    MCV 86 80 - 100 fL    MCH 29.4 26.0 - 34.0 pg    MCHC 34.1 32.0 - 36.0 g/dL    RDW 13.2 11.5 - 14.5 %    Platelets 363 150 - 450 x10*3/uL   Basic Metabolic Panel   Result Value Ref Range    Glucose 149 (H) 74 - 99 mg/dL    Sodium 137 136 - 145 mmol/L    Potassium 4.0 3.5 - 5.3 mmol/L    Chloride 102 98 - 107 mmol/L    Bicarbonate 30 21 - 32 mmol/L    Anion Gap 9 (L) 10 - 20 mmol/L    Urea Nitrogen 7 6 - 23 mg/dL    Creatinine 0.57 0.50 - 1.05 mg/dL    eGFR >90 >60 mL/min/1.73m*2    Calcium 8.9 8.6 - 10.3 mg/dL   POCT GLUCOSE   Result Value Ref Range    POCT Glucose 142 (H) 74 - 99 mg/dL   POCT GLUCOSE   Result Value Ref Range    POCT Glucose 193 (H) 74 - 99 mg/dL     No results found.                 Assessment/Plan   Assessment & Plan  Frequent falls    Bilateral lower leg cellulitis    Candidiasis of skin    Elevated blood sugar  level    Hypertension    Failure to thrive (child)    COPD (chronic obstructive pulmonary disease) (Multi)    Abnormal chest x-ray    UTI (urinary tract infection)    Leg cellulitis is almost resolved  Urine cultures growing E. coli  Continue Rocephin susceptible  continue nystatin cream for the fungal rash  Blood pressure running high  Toprol-XL 50 mg a day  Add losartan as blood pressure still high on  COPD stable  Add A1c  Continue sliding scale insulin for elevated blood sugar  Patient's A1c was 7.9 4 years ago she has not been taking any diabetes medication  CT lung shows.  Nodules which needs follow-up  Physical therapy recommended rehab  Plan for rehab when arrangements made       I spent 2 minutes in the professional and overall care of this patient.      Shelbie Ling MD

## 2024-10-27 NOTE — CARE PLAN
The patient's goals for the shift include Maintain safety    The clinical goals for the shift include safety

## 2024-10-28 LAB
ATRIAL RATE: 108 BPM
GLUCOSE BLD MANUAL STRIP-MCNC: 151 MG/DL (ref 74–99)
GLUCOSE BLD MANUAL STRIP-MCNC: 160 MG/DL (ref 74–99)
GLUCOSE BLD MANUAL STRIP-MCNC: 176 MG/DL (ref 74–99)
P AXIS: 14 DEGREES
P OFFSET: 188 MS
P ONSET: 128 MS
PR INTERVAL: 196 MS
Q ONSET: 226 MS
QRS COUNT: 18 BEATS
QRS DURATION: 60 MS
QT INTERVAL: 322 MS
QTC CALCULATION(BAZETT): 431 MS
QTC FREDERICIA: 391 MS
R AXIS: -11 DEGREES
T AXIS: -14 DEGREES
T OFFSET: 387 MS
VENTRICULAR RATE: 108 BPM

## 2024-10-28 PROCEDURE — 2500000004 HC RX 250 GENERAL PHARMACY W/ HCPCS (ALT 636 FOR OP/ED): Performed by: INTERNAL MEDICINE

## 2024-10-28 PROCEDURE — 2500000002 HC RX 250 W HCPCS SELF ADMINISTERED DRUGS (ALT 637 FOR MEDICARE OP, ALT 636 FOR OP/ED): Performed by: INTERNAL MEDICINE

## 2024-10-28 PROCEDURE — 2500000001 HC RX 250 WO HCPCS SELF ADMINISTERED DRUGS (ALT 637 FOR MEDICARE OP): Performed by: INTERNAL MEDICINE

## 2024-10-28 PROCEDURE — 99233 SBSQ HOSP IP/OBS HIGH 50: CPT | Performed by: INTERNAL MEDICINE

## 2024-10-28 PROCEDURE — 82947 ASSAY GLUCOSE BLOOD QUANT: CPT

## 2024-10-28 PROCEDURE — 97535 SELF CARE MNGMENT TRAINING: CPT | Mod: GO,CO

## 2024-10-28 PROCEDURE — 97530 THERAPEUTIC ACTIVITIES: CPT | Mod: GO,CO

## 2024-10-28 PROCEDURE — 1200000002 HC GENERAL ROOM WITH TELEMETRY DAILY

## 2024-10-28 PROCEDURE — 97530 THERAPEUTIC ACTIVITIES: CPT | Mod: GP

## 2024-10-28 RX ORDER — LOSARTAN POTASSIUM 100 MG/1
100 TABLET ORAL DAILY
Status: DISCONTINUED | OUTPATIENT
Start: 2024-10-29 | End: 2024-11-04 | Stop reason: HOSPADM

## 2024-10-28 RX ORDER — METFORMIN HYDROCHLORIDE 500 MG/1
1000 TABLET, EXTENDED RELEASE ORAL
Status: DISCONTINUED | OUTPATIENT
Start: 2024-10-28 | End: 2024-11-04 | Stop reason: HOSPADM

## 2024-10-28 ASSESSMENT — COGNITIVE AND FUNCTIONAL STATUS - GENERAL
WALKING IN HOSPITAL ROOM: TOTAL
WALKING IN HOSPITAL ROOM: TOTAL
DRESSING REGULAR LOWER BODY CLOTHING: TOTAL
TOILETING: TOTAL
DAILY ACTIVITIY SCORE: 13
TURNING FROM BACK TO SIDE WHILE IN FLAT BAD: A LOT
DRESSING REGULAR UPPER BODY CLOTHING: A LITTLE
DAILY ACTIVITIY SCORE: 13
MOBILITY SCORE: 10
STANDING UP FROM CHAIR USING ARMS: A LOT
HELP NEEDED FOR BATHING: A LOT
EATING MEALS: A LITTLE
PERSONAL GROOMING: A LITTLE
TURNING FROM BACK TO SIDE WHILE IN FLAT BAD: A LOT
DRESSING REGULAR UPPER BODY CLOTHING: A LOT
HELP NEEDED FOR BATHING: A LOT
TURNING FROM BACK TO SIDE WHILE IN FLAT BAD: TOTAL
PERSONAL GROOMING: A LITTLE
MOVING TO AND FROM BED TO CHAIR: A LOT
PERSONAL GROOMING: A LITTLE
HELP NEEDED FOR BATHING: A LOT
DRESSING REGULAR LOWER BODY CLOTHING: A LOT
CLIMB 3 TO 5 STEPS WITH RAILING: TOTAL
TOILETING: TOTAL
DAILY ACTIVITIY SCORE: 13
DRESSING REGULAR UPPER BODY CLOTHING: A LOT
MOVING FROM LYING ON BACK TO SITTING ON SIDE OF FLAT BED WITH BEDRAILS: A LOT
MOBILITY SCORE: 10
MOVING TO AND FROM BED TO CHAIR: TOTAL
DRESSING REGULAR LOWER BODY CLOTHING: A LOT
CLIMB 3 TO 5 STEPS WITH RAILING: TOTAL
MOVING FROM LYING ON BACK TO SITTING ON SIDE OF FLAT BED WITH BEDRAILS: A LOT
MOBILITY SCORE: 7
CLIMB 3 TO 5 STEPS WITH RAILING: TOTAL
STANDING UP FROM CHAIR USING ARMS: A LOT
STANDING UP FROM CHAIR USING ARMS: TOTAL
WALKING IN HOSPITAL ROOM: TOTAL
TOILETING: TOTAL
MOVING FROM LYING ON BACK TO SITTING ON SIDE OF FLAT BED WITH BEDRAILS: A LOT
EATING MEALS: A LITTLE
EATING MEALS: A LITTLE
MOVING TO AND FROM BED TO CHAIR: A LOT

## 2024-10-28 ASSESSMENT — PAIN - FUNCTIONAL ASSESSMENT
PAIN_FUNCTIONAL_ASSESSMENT: 0-10
PAIN_FUNCTIONAL_ASSESSMENT: 0-10

## 2024-10-28 ASSESSMENT — PAIN SCALES - GENERAL
PAINLEVEL_OUTOF10: 0 - NO PAIN
PAINLEVEL_OUTOF10: 0 - NO PAIN
PAINLEVEL_OUTOF10: 3

## 2024-10-28 ASSESSMENT — ACTIVITIES OF DAILY LIVING (ADL): HOME_MANAGEMENT_TIME_ENTRY: 25

## 2024-10-28 NOTE — NURSING NOTE
"Inpatient Diabetes Education: chart review:    Patient is a 73 y/o female came to ED on 10/24/24 at 09:32 with c/o \"malaise. Fatigue & fall one week ago\".  Patient lives home alone.  BS in ED was 218 mg/dl.     H/o asthma, HTN, brain tumor, current smoker, COPD, bilateral leg cellulitis, candidiasis of skin, hyperglycemia, UTI    POCT glucose 10/28/24 at 12:28 was 160 mg/dl.     Patient is newly diagnosed T2DM.  HbA1c 8.6% dated 10/27/24.  No antihyperglycemic agents prior to admission.    While inpatient getting Metformin 1000 mg bid,   Lispro SSI 0-5 units TID    Per medical record patient unable to care for self at home and will be discharged to [facility].    Will continue to monitor POCT glucose.      Spoke with LEX Silverman who said \"patient is unable to retain education\".    Not a candidate for education.       "

## 2024-10-28 NOTE — NURSING NOTE
Assumed care.  Seen patient on bed awake.  No distress or discomfort noted.  Safety measures ensured and call light within reach.

## 2024-10-28 NOTE — PROGRESS NOTES
Occupational Therapy    OT Treatment    Patient Name: Marianela Green  MRN: [id number]  Department: [address]  Room: [facility]  Today's Date: 10/28/2024  Time Calculation  Start Time: 0935  Stop Time: 1014  Time Calculation (min): 39 min        Assessment:  OT Assessment: Tolerated session fairly, demonstrating progression towards POC with increased assist required for funcional OOb tasks. Pt would benefit from continued skilled OT services to improve strength, balance, and functional tolerance to increase independence with ADL tasks  End of Session Communication: Bedside nurse  End of Session Patient Position: Bed, 3 rail up, Alarm on  OT Assessment Results: Decreased ADL status, Decreased upper extremity strength, Decreased safe judgment during ADL, Decreased cognition, Decreased endurance, Decreased functional mobility, Decreased gross motor control, Decreased trunk control for functional activities  Plan:  Treatment Interventions: ADL retraining, Functional transfer training, UE strengthening/ROM, Endurance training, Patient/family training, Equipment evaluation/education, Neuromuscular reeducation, Compensatory technique education  OT Frequency: 4 times per week  OT Discharge Recommendations: Moderate intensity level of continued care  OT Recommended Transfer Status: Assist of 2  OT - OK to Discharge: Yes  Treatment Interventions: ADL retraining, Functional transfer training, UE strengthening/ROM, Endurance training, Patient/family training, Equipment evaluation/education, Neuromuscular reeducation, Compensatory technique education    Subjective   Previous Visit Info:  OT Last Visit  OT Received On: 10/28/24  General:  General  Co-Treatment: PT  Co-Treatment Reason: partial co-tx required for safe pt handling during functional transfer  Prior to Session Communication: Bedside nurse  Patient Position Received: Bed, 3 rail up, Alarm off, not on at start of session  General Comment: Cleared for therapy per RN. Pt  supine in bed upon arrival and agreeable to tx-attempting to breakfast not in optimal position and reaching off the side of bed to get food  Precautions:  Hearing/Visual Limitations: glasses  Medical Precautions: Fall precautions    Pain:  Pain Assessment  Pain Assessment: 0-10  0-10 (Numeric) Pain Score: 3  Pain Type: Chronic pain  Pain Location: Foot (heels)  Pain Orientation: Right, Left  Pain Interventions: Repositioned    Objective    Cognition:  Cognition  Orientation Level: Disoriented to situation, Disoriented to time  Safety Judgment: Decreased awareness of need for safety precautions  Cognition Comments: poor insight, intermittent confusion  Safety/Judgement: Exceptions to WFL  Insight: Moderate  Impulsive: Mildly  Processing Speed: Delayed    Activities of Daily Living: Feeding  Feeding Level of Assistance: Setup, Close supervision  Feeding Where Assessed: Edge of bed  Feeding Comments: sat up to EOB to complete feeding task with increased time required for task completion    Grooming  Grooming Level of Assistance: Setup, Close supervision  Grooming Where Assessed: Edge of bed  Grooming Comments: face washing and oral hygiene tasks with cues for task efficiency    UE Dressing  UE Dressing Level of Assistance: Minimum assistance  UE Dressing Where Assessed: Edge of bed  UE Dressing Comments: assist to don/doff gown    LE Dressing  LE Dressing: Yes  Sock Level of Assistance: Dependent  LE Dressing Where Assessed: Edge of bed  LE Dressing Comments: don socks    Toileting  Toileting Level of Assistance: Dependent  Where Assessed: Other (Comment)  Toileting Comments: demonstrating increased bowel and urine incontinence upon stand, requiring dependent pericare hygiene with increased time required for task efficiency    Bed Mobility/Transfers: Bed Mobility  Bed Mobility: Yes  Bed Mobility 1  Bed Mobility 1: Supine to sitting  Level of Assistance 1: Moderate assistance  Bed Mobility Comments 1: assist with BLE  advancement off EOB and for trunk elevation with cues for use of bed rail for UE support, requiring additional assist to scoot hips to EOB with use of drawsheet  Bed Mobility 2  Bed Mobility  2: Sitting to supine  Level of Assistance 2: Moderate assistance, Moderate verbal cues  Bed Mobility Comments 2: assist to lift BLE on bed with cues for adjust trunk to midline for optimal posture, dep x2 to boost to HOB with use of drawsheet    Transfers  Transfer: Yes  Transfer 1  Technique 1: Sit to stand, Stand to sit  Transfer Device 1: Walker  Transfer Level of Assistance 1: Maximum assistance, +2, Moderate verbal cues  Trials/Comments 1: sit>stand x3 with assist for trunk elevation and forward weightshifting with repetitive cues required for optimal posture with pt demonstrating difficulty with errect posture, with additional cues required for enocuragement, expressing increased fatigue and having to return to seated EOB, demonstrating decreased eccentric lowering    Sitting Balance:  Dynamic Sitting Balance  Dynamic Sitting-Level of Assistance: Close supervision  Dynamic Sitting-Balance: Lateral lean, Reaching for objects, Reaching across midline  Dynamic Sitting-Comments: fair+ seated balance EOB during grooming tasks  Standing Balance:  Static Standing Balance  Static Standing-Level of Assistance: Maximum assistance (x2)  Static Standing-Comment/Number of Minutes: poor standing balance during rear pericare hygiene    Therapy/Activity:    Therapeutic Activity  Therapeutic Activity Performed: Yes  Therapeutic Activity 1: tolerated sitting EOB ~17 min during ADL tasks with fair+ seated balance    Outcome Measures:[facility] Daily Activity  Putting on and taking off regular lower body clothing: Total  Bathing (including washing, rinsing, drying): A lot  Putting on and taking off regular upper body clothing: A little  Toileting, which includes using toilet, bedpan or urinal: Total  Taking care of personal grooming such as  brushing teeth: A little  Eating Meals: A little  Daily Activity - Total Score: 13    Education Documentation  Body Mechanics, taught by JANAY Shaw at 10/28/2024 11:25 AM.  Learner: Patient  Readiness: Acceptance  Method: Explanation  Response: Verbalizes Understanding, Needs Reinforcement    Home Exercise Program, taught by JANAY Shaw at 10/28/2024 11:25 AM.  Learner: Patient  Readiness: Acceptance  Method: Explanation  Response: Verbalizes Understanding, Needs Reinforcement    ADL Training, taught by JANAY Shaw at 10/28/2024 11:25 AM.  Learner: Patient  Readiness: Acceptance  Method: Explanation  Response: Verbalizes Understanding, Needs Reinforcement    Education Comments  No comments found.      Problem: OT Goals  Goal: Pt will complete self-feeding with mod indep/indep.  Outcome: Progressing  Goal: Pt will complete all grooming tasks with setup while seated.  Outcome: Progressing  Goal: Pt will tolerate sitting EOB for at least 10-15 minutes during therex and/or ADL's.  Outcome: Progressing  Goal: Pt will complete all functional transfers from bed to wheelchair/chair/bedside commode with mod A using a device as recommended by skilled PT.  Outcome: Progressing  Goal: Pt will complete UB bathing with min A to setup.  Outcome: Progressing

## 2024-10-28 NOTE — CARE PLAN
The patient's goals for the shift include Maintain safety    The clinical goals for the shift include monitor safety    Problem: Pain - Adult  Goal: Verbalizes/displays adequate comfort level or baseline comfort level  Outcome: Progressing     Problem: Safety - Adult  Goal: Free from fall injury  Outcome: Progressing     Problem: Discharge Planning  Goal: Discharge to home or other facility with appropriate resources  Outcome: Progressing     Problem: Chronic Conditions and Co-morbidities  Goal: Patient's chronic conditions and co-morbidity symptoms are monitored and maintained or improved  Outcome: Progressing     Problem: Skin  Goal: Decreased wound size/increased tissue granulation at next dressing change  Outcome: Progressing  Flowsheets (Taken 10/28/2024 1008)  Decreased wound size/increased tissue granulation at next dressing change: Protective dressings over bony prominences  Goal: Participates in plan/prevention/treatment measures  Outcome: Progressing  Flowsheets (Taken 10/28/2024 1008)  Participates in plan/prevention/treatment measures:   Discuss with provider PT/OT consult   Elevate heels  Goal: Prevent/manage excess moisture  Outcome: Progressing  Flowsheets (Taken 10/28/2024 1008)  Prevent/manage excess moisture:   Cleanse incontinence/protect with barrier cream   Monitor for/manage infection if present  Goal: Prevent/minimize sheer/friction injuries  Outcome: Progressing  Flowsheets (Taken 10/28/2024 1008)  Prevent/minimize sheer/friction injuries:   HOB 30 degrees or less   Use pull sheet   Turn/reposition every 2 hours/use positioning/transfer devices  Goal: Promote/optimize nutrition  Outcome: Progressing  Flowsheets (Taken 10/28/2024 1008)  Promote/optimize nutrition:   Monitor/record intake including meals   Offer water/supplements/favorite foods   Consume > 50% meals/supplements  Goal: Promote skin healing  Outcome: Progressing  Flowsheets (Taken 10/28/2024 1008)  Promote skin healing:   Assess  skin/pad under line(s)/device(s)   Protective dressings over bony prominences     Problem: Pain  Goal: Takes deep breaths with improved pain control throughout the shift  Outcome: Progressing  Goal: Turns in bed with improved pain control throughout the shift  Outcome: Progressing  Goal: Walks with improved pain control throughout the shift  Outcome: Progressing  Goal: Performs ADL's with improved pain control throughout shift  Outcome: Progressing  Goal: Participates in PT with improved pain control throughout the shift  Outcome: Progressing  Goal: Free from opioid side effects throughout the shift  Outcome: Progressing  Goal: Free from acute confusion related to pain meds throughout the shift  Outcome: Progressing     Problem: Fall/Injury  Goal: Not fall by end of shift  Outcome: Progressing  Goal: Be free from injury by end of the shift  Outcome: Progressing  Goal: Verbalize understanding of personal risk factors for fall in the hospital  Outcome: Progressing  Goal: Verbalize understanding of risk factor reduction measures to prevent injury from fall in the home  Outcome: Progressing  Goal: Use assistive devices by end of the shift  Outcome: Progressing  Goal: Pace activities to prevent fatigue by end of the shift  Outcome: Progressing     Problem: Nutrition  Goal: Oral intake greater 75%  Outcome: Progressing

## 2024-10-28 NOTE — PROGRESS NOTES
"Marianela Green is a 74 y.o. female on day 4 of admission presenting with Frequent falls.    Subjective   Patient is feeling better.  She is eating little better today.  Still remains weak and not able to ambulate on her own.  Blood pressure and blood sugar still little high.  Leg swelling and redness is doing better       Objective     Physical Exam  Vitals reviewed.   Constitutional:       Appearance: Normal appearance.   HENT:      Head: Normocephalic and atraumatic.      Right Ear: Tympanic membrane, ear canal and external ear normal.      Left Ear: Tympanic membrane, ear canal and external ear normal.      Nose: Nose normal.      Mouth/Throat:      Pharynx: Oropharynx is clear.   Eyes:      Extraocular Movements: Extraocular movements intact.      Conjunctiva/sclera: Conjunctivae normal.      Pupils: Pupils are equal, round, and reactive to light.   Cardiovascular:      Rate and Rhythm: Normal rate and regular rhythm.      Pulses: Normal pulses.      Heart sounds: Normal heart sounds.   Pulmonary:      Effort: Pulmonary effort is normal.      Breath sounds: Normal breath sounds.   Abdominal:      General: Abdomen is flat. Bowel sounds are normal.      Palpations: Abdomen is soft.   Musculoskeletal:      Cervical back: Normal range of motion and neck supple.   Skin:     General: Skin is warm and dry.      Comments:   Fungal rash in skin fold   Neurological:      General: No focal deficit present.      Mental Status: She is alert and oriented to person, place, and time.   Psychiatric:         Mood and Affect: Mood normal.         Last Recorded Vitals  Blood pressure 170/60, pulse 70, temperature 37.3 °C (99.1 °F), temperature source Oral, resp. rate 16, height 1.6 m (5' 3\"), weight 95.1 kg (209 lb 10.5 oz), SpO2 100%.  Intake/Output last 3 Shifts:  I/O last 3 completed shifts:  In: 540 (5.7 mL/kg) [P.O.:540]  Out: 1400 (14.7 mL/kg) [Urine:1400 (0.4 mL/kg/hr)]  Weight: 95.1 kg     Relevant Results             "   Scheduled medications  cefTRIAXone, 1 g, intravenous, q24h  docusate sodium, 100 mg, oral, BID  enoxaparin, 40 mg, subcutaneous, Daily  insulin lispro, 0-5 Units, subcutaneous, TID  losartan, 50 mg, oral, Daily  metFORMIN (MOD), 1,000 mg, oral, Daily with evening meal  metoprolol succinate XL, 50 mg, oral, Daily  nystatin, , Topical, q8h  pantoprazole, 40 mg, oral, Daily   Or  pantoprazole, 40 mg, intravenous, Daily  polyethylene glycol, 17 g, oral, Daily      Continuous medications     PRN medications  PRN medications: acetaminophen **OR** acetaminophen **OR** acetaminophen, benzocaine-menthol, dextromethorphan-guaifenesin, guaiFENesin, melatonin, ondansetron **OR** ondansetron  Results for orders placed or performed during the hospital encounter of 10/24/24 (from the past 24 hours)   POCT GLUCOSE   Result Value Ref Range    POCT Glucose 166 (H) 74 - 99 mg/dL   POCT GLUCOSE   Result Value Ref Range    POCT Glucose 186 (H) 74 - 99 mg/dL   POCT GLUCOSE   Result Value Ref Range    POCT Glucose 160 (H) 74 - 99 mg/dL     No results found.                 Assessment/Plan   Assessment & Plan  Frequent falls    Bilateral lower leg cellulitis    Candidiasis of skin    Elevated blood sugar level    Hypertension    Failure to thrive (child)    COPD (chronic obstructive pulmonary disease) (Multi)    Abnormal chest x-ray    UTI (urinary tract infection)    Leg cellulitis is almost resolved  Urine cultures growing E. coli  Continue Rocephin susceptible  continue nystatin cream for the fungal rash  Blood pressure running high  Toprol-XL 50 mg a day  Increased dose of losartan  COPD stable  A1c 8.6 increase metformin 1000 mg twice a day  Continue sliding scale insulin for elevated blood sugar  Increase metformin twice a day  CT lung shows nodules  Nodules which needs follow-up  Physical therapy recommended rehab  Plan for rehab when arrangements made       I spent 2 minutes in the professional and overall care of this  patient.      Shelbie Ling MD

## 2024-10-28 NOTE — PROGRESS NOTES
10/28/24 0705   Discharge Planning   Expected Discharge Disposition SNF     SNF choices received via [employer], built and sent for review.    Awaiting accepting facilities at this time.      Will need to confirm FOC and start precert when medically ready for discharge.

## 2024-10-28 NOTE — PROGRESS NOTES
Physical Therapy    Physical Therapy Treatment    Patient Name: Marianela Green  MRN: [id number]  Department: [address]  Room: [address] [place] [address]  Today's Date: 10/28/2024  Time Calculation  Start Time: 0958  Stop Time: 1020  Time Calculation (min): 22 min         Assessment/Plan   PT Assessment  End of Session Communication: Bedside nurse  Assessment Comment: Patient presents with B LE weakness, impaired mobility, impaired functional activity tolerance, impaired balance. Patient has had a significant functional decline and was not thriving well at home. Patient requires acute PT to address functional defictis.  End of Session Patient Position: Bed, 3 rail up, Alarm on (needs in reach, RN aware. PCA brought new purewick in as old one was saturated in BM - PCA to replace.)  PT Plan  Inpatient/Swing Bed or Outpatient: Inpatient  PT Plan  Treatment/Interventions: Bed mobility, Transfer training, Gait training, Endurance training, Therapeutic activity, Therapeutic exercise, Postural re-education  PT Plan: Ongoing PT  PT Frequency: 4 times per week  PT Discharge Recommendations: Moderate intensity level of continued care  Equipment Recommended upon Discharge: Wheeled walker, Wheelchair  PT Recommended Transfer Status: Total assist  PT - OK to Discharge: Yes      General Visit Information:   PT  Visit  PT Received On: 10/28/24  Response to Previous Treatment: Patient with no complaints from previous session.  General  Co-Treatment: OT  Co-Treatment Reason: Physical complexity and limited activity tolerance  Prior to Session Communication: Bedside nurse  Patient Position Received: Bed, 3 rail up, Alarm on  General Comment: cleared for therapy by nursing, seated EOB with PINTO upon arrival and agreeable to therapy.    Subjective     Objective   Pain:  Pain Assessment  Pain Assessment: 0-10  0-10 (Numeric) Pain Score: 0 - No pain  Cognition:  Cognition  Orientation Level: Disoriented to situation, Disoriented to time    Activity  Tolerance:  Activity Tolerance  Endurance: Decreased tolerance for upright activites  Treatments:  Bed Mobility 1  Bed Mobility Comments 1: seated EOB x several minutes with good static sitting balance without LOB or s/s, supervision for safety.  mod A for bringing BLEs back into bed. dep x 2 boost to HOB.    Transfer 1  Trials/Comments 1: 3 sit <> stands performed at RW with verbal and tactile cues for safe hand placement, BLE placement, forward lean. max A x 2 to perform, retropulsive with forward flexion. max cues for upright posture, pushing down on walker, BLE ext with poor carryover. +BM in standing. dep joey-care. max A x 2 for static standing at RW x 2 minutes each trial.    Outcome Measures:  [facility] Basic Mobility  Turning from your back to your side while in a flat bed without using bedrails: A lot  Moving from lying on your back to sitting on the side of a flat bed without using bedrails: Total  Moving to and from bed to chair (including a wheelchair): Total  Standing up from a chair using your arms (e.g. wheelchair or bedside chair): Total  To walk in hospital room: Total  Climbing 3-5 steps with railing: Total  Basic Mobility - Total Score: 7    IP EDUCATION:  Outpatient Education  Individual(s) Educated: Patient  Education Provided: Body Mechanics, Fall Risk, Posture  Risk and Benefits Discussed with Patient/Caregiver/Other: yes  Patient/Caregiver Demonstrated Understanding: yes  Plan of Care Discussed and Agreed Upon: yes    Encounter Problems       Encounter Problems (Active)       PT Goals       Patient will transfer supine to sit and sit to supine with minimal assist to facilitate mobility.  (Progressing)       Start:  10/25/24    Expected End:  11/08/24            Patient will transfer sit to stand and stand to sit with  moderate assist to facilitate mobility.  (Progressing)       Start:  10/25/24    Expected End:  11/08/24            Patient will transfer bed to chair and chair to bed with  moderate assist to facilitate mobility.  (Progressing)       Start:  10/25/24    Expected End:  11/08/24            Patient will amb 15 feet with rolling walker device including no turns on even surface with moderate assist x 2 with wheelchair follow  to facilitate safe mobility.  (Progressing)       Start:  10/25/24    Expected End:  11/08/24            Patient will increase B LE strength to 3+/5 to improve functional mobility.    (Progressing)       Start:  10/25/24    Expected End:  11/08/24                   Pain - Adult

## 2024-10-28 NOTE — PROGRESS NOTES
10/28/24 1030   Discharge Planning   Expected Discharge Disposition SNF     Daughter loreta made aware of [facility] and [facility] acceptance, states she will speak to her sister and update [facility] on FOC   Precert needed prior to discharge     UPDATE 1115: [facility] spoke to loreta [facility] and pt is medically ready per Dr benitez T[facility] to have team start precert     UPDATE 1334: auth is pending. [id number]   [facility]     ** do not discharge without speaking to care coordination**

## 2024-10-29 ENCOUNTER — DOCUMENTATION (OUTPATIENT)
Dept: RESEARCH | Age: 74
End: 2024-10-29
Payer: MEDICARE

## 2024-10-29 LAB
GLUCOSE BLD MANUAL STRIP-MCNC: 126 MG/DL (ref 74–99)
GLUCOSE BLD MANUAL STRIP-MCNC: 137 MG/DL (ref 74–99)
GLUCOSE BLD MANUAL STRIP-MCNC: 152 MG/DL (ref 74–99)
GLUCOSE BLD MANUAL STRIP-MCNC: 156 MG/DL (ref 74–99)

## 2024-10-29 PROCEDURE — 97110 THERAPEUTIC EXERCISES: CPT | Mod: GO,CO

## 2024-10-29 PROCEDURE — 1200000002 HC GENERAL ROOM WITH TELEMETRY DAILY

## 2024-10-29 PROCEDURE — 2500000004 HC RX 250 GENERAL PHARMACY W/ HCPCS (ALT 636 FOR OP/ED): Performed by: INTERNAL MEDICINE

## 2024-10-29 PROCEDURE — 2500000002 HC RX 250 W HCPCS SELF ADMINISTERED DRUGS (ALT 637 FOR MEDICARE OP, ALT 636 FOR OP/ED): Performed by: INTERNAL MEDICINE

## 2024-10-29 PROCEDURE — 2500000005 HC RX 250 GENERAL PHARMACY W/O HCPCS: Performed by: INTERNAL MEDICINE

## 2024-10-29 PROCEDURE — 99232 SBSQ HOSP IP/OBS MODERATE 35: CPT | Performed by: INTERNAL MEDICINE

## 2024-10-29 PROCEDURE — 82947 ASSAY GLUCOSE BLOOD QUANT: CPT

## 2024-10-29 PROCEDURE — 97530 THERAPEUTIC ACTIVITIES: CPT | Mod: GO,CO

## 2024-10-29 PROCEDURE — 2500000001 HC RX 250 WO HCPCS SELF ADMINISTERED DRUGS (ALT 637 FOR MEDICARE OP): Performed by: INTERNAL MEDICINE

## 2024-10-29 ASSESSMENT — COGNITIVE AND FUNCTIONAL STATUS - GENERAL
DAILY ACTIVITIY SCORE: 12
DRESSING REGULAR LOWER BODY CLOTHING: A LOT
MOVING FROM LYING ON BACK TO SITTING ON SIDE OF FLAT BED WITH BEDRAILS: A LITTLE
TURNING FROM BACK TO SIDE WHILE IN FLAT BAD: A LOT
TOILETING: TOTAL
HELP NEEDED FOR BATHING: A LOT
HELP NEEDED FOR BATHING: A LOT
MOBILITY SCORE: 11
EATING MEALS: A LITTLE
DRESSING REGULAR UPPER BODY CLOTHING: A LITTLE
TOILETING: TOTAL
PERSONAL GROOMING: A LITTLE
CLIMB 3 TO 5 STEPS WITH RAILING: TOTAL
MOVING TO AND FROM BED TO CHAIR: A LOT
EATING MEALS: A LITTLE
STANDING UP FROM CHAIR USING ARMS: A LOT
PERSONAL GROOMING: A LITTLE
WALKING IN HOSPITAL ROOM: TOTAL
DRESSING REGULAR LOWER BODY CLOTHING: TOTAL
DAILY ACTIVITIY SCORE: 14
DRESSING REGULAR UPPER BODY CLOTHING: A LOT

## 2024-10-29 ASSESSMENT — PAIN - FUNCTIONAL ASSESSMENT
PAIN_FUNCTIONAL_ASSESSMENT: 0-10
PAIN_FUNCTIONAL_ASSESSMENT: 0-10

## 2024-10-29 ASSESSMENT — PAIN SCALES - GENERAL
PAINLEVEL_OUTOF10: 0 - NO PAIN
PAINLEVEL_OUTOF10: 6
PAINLEVEL_OUTOF10: 0 - NO PAIN
PAINLEVEL_OUTOF10: 0 - NO PAIN
PAINLEVEL_OUTOF10: 3

## 2024-10-29 NOTE — CARE PLAN
The patient's goals for the shift include Maintain safety    The clinical goals for the shift include safety    Problem: Pain - Adult  Goal: Verbalizes/displays adequate comfort level or baseline comfort level  Outcome: Progressing     Problem: Safety - Adult  Goal: Free from fall injury  Outcome: Progressing     Problem: Discharge Planning  Goal: Discharge to home or other facility with appropriate resources  Outcome: Progressing     Problem: Chronic Conditions and Co-morbidities  Goal: Patient's chronic conditions and co-morbidity symptoms are monitored and maintained or improved  Outcome: Progressing     Problem: Skin  Goal: Decreased wound size/increased tissue granulation at next dressing change  Outcome: Progressing  Flowsheets (Taken 10/29/2024 1131)  Decreased wound size/increased tissue granulation at next dressing change: Protective dressings over bony prominences  Goal: Participates in plan/prevention/treatment measures  Outcome: Progressing  Flowsheets (Taken 10/29/2024 1131)  Participates in plan/prevention/treatment measures:   Discuss with provider PT/OT consult   Elevate heels  Goal: Prevent/manage excess moisture  Outcome: Progressing  Flowsheets (Taken 10/29/2024 1131)  Prevent/manage excess moisture:   Monitor for/manage infection if present   Cleanse incontinence/protect with barrier cream  Goal: Prevent/minimize sheer/friction injuries  Outcome: Progressing  Flowsheets (Taken 10/29/2024 1131)  Prevent/minimize sheer/friction injuries:   Use pull sheet   HOB 30 degrees or less   Turn/reposition every 2 hours/use positioning/transfer devices  Goal: Promote/optimize nutrition  Outcome: Progressing  Flowsheets (Taken 10/29/2024 1131)  Promote/optimize nutrition:   Monitor/record intake including meals   Offer water/supplements/favorite foods   Consume > 50% meals/supplements  Goal: Promote skin healing  Outcome: Progressing  Flowsheets (Taken 10/29/2024 1131)  Promote skin healing:   Assess skin/pad  under line(s)/device(s)   Turn/reposition every 2 hours/use positioning/transfer devices   Protective dressings over bony prominences     Problem: Pain  Goal: Takes deep breaths with improved pain control throughout the shift  Outcome: Progressing  Goal: Turns in bed with improved pain control throughout the shift  Outcome: Progressing  Goal: Walks with improved pain control throughout the shift  Outcome: Progressing  Goal: Performs ADL's with improved pain control throughout shift  Outcome: Progressing  Goal: Participates in PT with improved pain control throughout the shift  Outcome: Progressing  Goal: Free from opioid side effects throughout the shift  Outcome: Progressing  Goal: Free from acute confusion related to pain meds throughout the shift  Outcome: Progressing     Problem: Fall/Injury  Goal: Not fall by end of shift  Outcome: Progressing  Goal: Be free from injury by end of the shift  Outcome: Progressing  Goal: Verbalize understanding of personal risk factors for fall in the hospital  Outcome: Progressing  Goal: Verbalize understanding of risk factor reduction measures to prevent injury from fall in the home  Outcome: Progressing  Goal: Use assistive devices by end of the shift  Outcome: Progressing  Goal: Pace activities to prevent fatigue by end of the shift  Outcome: Progressing     Problem: Nutrition  Goal: Oral intake greater 75%  Outcome: Progressing

## 2024-10-29 NOTE — NURSING NOTE
Inpatient Diabetes Education follow up:    POCT glucose today 10/29/24 at 12:57 was 137 mg/dl.    No change in diabetes medications, still getting Metformin 1000 mg bid, Lispro SSI 0-4 units Tid.    Will continue to monitor POCT glucose

## 2024-10-29 NOTE — PROGRESS NOTES
Insurance is offering a p2p: please call [phone] option 5 by 4:30pm today. Member ID [id number] info given to Dr benitez at this time     Jeanine Newell RN

## 2024-10-29 NOTE — NURSING NOTE
Assumed care for patient at 1900. All bedtime medication given. No complaint of pain at the moment. Patient is in bed asleep. Will continue to follow plan of care.

## 2024-10-29 NOTE — PROGRESS NOTES
Occupational Therapy    OT Treatment    Patient Name: Marianela Green  MRN: [id number]  Department: [address]  Room: [address] [place] [address]  Today's Date: 10/29/2024  Time Calculation  Start Time: 1306  Stop Time: 1330  Time Calculation (min): 24 min        Assessment:  OT Assessment: Tolerated session fairly, demonstrating minor progression towards POC with significant assist required for transfer tasks. Pt would benefit from continued skilled OT services to improve strength, balance, and functional tolerance to increase independence with ADL tasks  End of Session Communication: Bedside nurse  End of Session Patient Position: Bed, 3 rail up, Alarm on  OT Assessment Results: Decreased ADL status, Decreased upper extremity strength, Decreased safe judgment during ADL, Decreased cognition, Decreased endurance, Decreased functional mobility, Decreased gross motor control, Decreased trunk control for functional activities  Plan:  Treatment Interventions: ADL retraining, Functional transfer training, UE strengthening/ROM, Endurance training, Patient/family training, Equipment evaluation/education, Neuromuscular reeducation, Compensatory technique education  OT Frequency: 4 times per week  OT Discharge Recommendations: Moderate intensity level of continued care  OT Recommended Transfer Status: Assist of 2  OT - OK to Discharge: Yes  Treatment Interventions: ADL retraining, Functional transfer training, UE strengthening/ROM, Endurance training, Patient/family training, Equipment evaluation/education, Neuromuscular reeducation, Compensatory technique education    Subjective   Previous Visit Info:  OT Last Visit  OT Received On: 10/29/24  General:  General  Prior to Session Communication: Bedside nurse  Patient Position Received: Bed, 3 rail up, Alarm off, not on at start of session  General Comment: Cleared for therapy per RN. Pt supine in bed upon arrival and agreeable to tx with encouragement  Precautions:  Hearing/Visual Limitations:  glasses  Medical Precautions: Fall precautions    Pain:  Pain Assessment  Pain Assessment: 0-10  0-10 (Numeric) Pain Score: 6  Pain Type: Acute pain  Pain Location: Knee  Pain Orientation: Right  Pain Interventions: Repositioned, Ambulation/increased activity (RN aware)    Objective    Cognition:  Cognition  Orientation Level: Disoriented to situation, Disoriented to time  Safety Judgment: Decreased awareness of need for safety precautions  Cognition Comments: poor insight, self-limiting  Insight: Moderate  Processing Speed: Delayed    Activities of Daily Living:    Toileting  Toileting Level of Assistance: Dependent  Where Assessed: Bed level  Toileting Comments: demonstrating urine incontinence once supine, requiring dependent pericare hygiene    Bed Mobility/Transfers: Bed Mobility  Bed Mobility: Yes  Bed Mobility 1  Bed Mobility 1: Supine to sitting  Level of Assistance 1: Moderate assistance  Bed Mobility Comments 1: assist with BLE advancement and trunk elevation with pt pulling up from therapist with cues to scoot hips to EOB, requiring max A with use of drawsheet  Bed Mobility 2  Bed Mobility  2: Sitting to supine  Level of Assistance 2: Moderate assistance, Moderate verbal cues  Bed Mobility Comments 2: assist to lift BLE on bed with cues to adjust trunk to midline with increased time required for task completion. Dependent x2 to boost to HOB with use of drawsheet  Bed Mobility 3  Bed Mobility 3: Rolling right, Rolling left  Level of Assistance 3: Moderate assistance  Bed Mobility Comments 3: assist to roll L/R for pericare hygiene and linen adjustment    Transfers  Transfer: No (sit>stand attempt with max A with cues for proper hand placement and forward weightshifting with pt unable to elevate trunk off EOB and expressing increased R knee pain-increased retropulsion/resistiveness noted as well)    Sitting Balance:  Dynamic Sitting Balance  Dynamic Sitting-Level of Assistance: Close supervision  Dynamic  Sitting-Balance: Reaching across midline, Forward lean, Lateral lean  Dynamic Sitting-Comments: fair+ seated balance during BUE exercises     Therapy/Activity: Therapeutic Exercise  Therapeutic Exercise Performed: Yes  Therapeutic Exercise Activity 1: participated in AROM BUE exercises 4x10 in all planes to improve strength and functional tolerance to increase independence with transfer tasks with cues provided for proper muscle recruitement  Therapeutic Exercise Activity 2: participated in functional reaching task, retrieving items at various levels working on seated balance + abdominal strength in effort to increase independence with seated ADL/IADL tasks    Therapeutic Activity  Therapeutic Activity Performed: Yes  Therapeutic Activity 1: tolerated sitting EOB ~13 min during BUE exercises + reaching activity    Outcome Measures:[facility] Daily Activity  Putting on and taking off regular lower body clothing: Total  Bathing (including washing, rinsing, drying): A lot  Putting on and taking off regular upper body clothing: A lot  Toileting, which includes using toilet, bedpan or urinal: Total  Taking care of personal grooming such as brushing teeth: A little  Eating Meals: A little  Daily Activity - Total Score: 12    Education Documentation  Body Mechanics, taught by JANAY Shaw at 10/29/2024  2:29 PM.  Learner: Patient  Readiness: Acceptance  Method: Explanation  Response: Needs Reinforcement    Home Exercise Program, taught by JANAY Shaw at 10/29/2024  2:29 PM.  Learner: Patient  Readiness: Acceptance  Method: Explanation  Response: Needs Reinforcement    ADL Training, taught by JANAY Shaw at 10/29/2024  2:29 PM.  Learner: Patient  Readiness: Acceptance  Method: Explanation  Response: Needs Reinforcement    Education Comments  No comments found.      Problem: OT Goals  Goal: Pt will complete self-feeding with mod indep/indep.  Outcome: Progressing  Goal: Pt will  complete all grooming tasks with setup while seated.  Outcome: Progressing  Goal: Pt will tolerate sitting EOB for at least 10-15 minutes during therex and/or ADL's.  Outcome: Progressing  Goal: Pt will complete UB bathing with min A to setup.  Outcome: Progressing     Problem: OT Goals  Goal: Pt will complete all functional transfers from bed to wheelchair/chair/bedside commode with mod A using a device as recommended by skilled PT.  Outcome: Not Progressing

## 2024-10-29 NOTE — CARE PLAN
The patient's goals for the shift include Maintain safety    The clinical goals for the shift include monitor safety      Problem: Pain - Adult  Goal: Verbalizes/displays adequate comfort level or baseline comfort level  Outcome: Progressing     Problem: Safety - Adult  Goal: Free from fall injury  Outcome: Progressing     Problem: Discharge Planning  Goal: Discharge to home or other facility with appropriate resources  Outcome: Progressing     Problem: Chronic Conditions and Co-morbidities  Goal: Patient's chronic conditions and co-morbidity symptoms are monitored and maintained or improved  Outcome: Progressing     Problem: Skin  Goal: Decreased wound size/increased tissue granulation at next dressing change  Outcome: Progressing  Goal: Participates in plan/prevention/treatment measures  Outcome: Progressing  Goal: Prevent/manage excess moisture  Outcome: Progressing  Goal: Prevent/minimize sheer/friction injuries  Outcome: Progressing  Goal: Promote/optimize nutrition  Outcome: Progressing  Goal: Promote skin healing  Outcome: Progressing     Problem: Pain  Goal: Takes deep breaths with improved pain control throughout the shift  Outcome: Progressing  Goal: Turns in bed with improved pain control throughout the shift  Outcome: Progressing  Goal: Walks with improved pain control throughout the shift  Outcome: Progressing  Goal: Performs ADL's with improved pain control throughout shift  Outcome: Progressing  Goal: Participates in PT with improved pain control throughout the shift  Outcome: Progressing  Goal: Free from opioid side effects throughout the shift  Outcome: Progressing  Goal: Free from acute confusion related to pain meds throughout the shift  Outcome: Progressing     Problem: Fall/Injury  Goal: Not fall by end of shift  Outcome: Progressing  Goal: Be free from injury by end of the shift  Outcome: Progressing  Goal: Verbalize understanding of personal risk factors for fall in the hospital  Outcome:  Progressing  Goal: Verbalize understanding of risk factor reduction measures to prevent injury from fall in the home  Outcome: Progressing  Goal: Use assistive devices by end of the shift  Outcome: Progressing  Goal: Pace activities to prevent fatigue by end of the shift  Outcome: Progressing     Problem: Nutrition  Goal: Oral intake greater 75%  Outcome: Progressing

## 2024-10-29 NOTE — PROGRESS NOTES
10/29/24 1142   Discharge Planning   Expected Discharge Disposition SNF  ([facility])     Precert pending at this time  Escalated by team     ** do not discharge without speaking to care coordination**

## 2024-10-30 LAB
ANION GAP SERPL CALCULATED.3IONS-SCNC: 10 MMOL/L (ref 10–20)
ATRIAL RATE: 111 BPM
BUN SERPL-MCNC: 14 MG/DL (ref 6–23)
CALCIUM SERPL-MCNC: 9 MG/DL (ref 8.6–10.3)
CHLORIDE SERPL-SCNC: 101 MMOL/L (ref 98–107)
CO2 SERPL-SCNC: 29 MMOL/L (ref 21–32)
CREAT SERPL-MCNC: 0.73 MG/DL (ref 0.5–1.05)
EGFRCR SERPLBLD CKD-EPI 2021: 86 ML/MIN/1.73M*2
ERYTHROCYTE [DISTWIDTH] IN BLOOD BY AUTOMATED COUNT: 13.4 % (ref 11.5–14.5)
GLUCOSE BLD MANUAL STRIP-MCNC: 129 MG/DL (ref 74–99)
GLUCOSE BLD MANUAL STRIP-MCNC: 133 MG/DL (ref 74–99)
GLUCOSE BLD MANUAL STRIP-MCNC: 139 MG/DL (ref 74–99)
GLUCOSE BLD MANUAL STRIP-MCNC: 181 MG/DL (ref 74–99)
GLUCOSE SERPL-MCNC: 146 MG/DL (ref 74–99)
HCT VFR BLD AUTO: 40.3 % (ref 36–46)
HGB BLD-MCNC: 13.2 G/DL (ref 12–16)
MCH RBC QN AUTO: 28.9 PG (ref 26–34)
MCHC RBC AUTO-ENTMCNC: 32.8 G/DL (ref 32–36)
MCV RBC AUTO: 88 FL (ref 80–100)
NRBC BLD-RTO: 0 /100 WBCS (ref 0–0)
P AXIS: 38 DEGREES
P OFFSET: 183 MS
P ONSET: 133 MS
PLATELET # BLD AUTO: 338 X10*3/UL (ref 150–450)
POTASSIUM SERPL-SCNC: 4.1 MMOL/L (ref 3.5–5.3)
PR INTERVAL: 176 MS
Q ONSET: 221 MS
QRS COUNT: 19 BEATS
QRS DURATION: 68 MS
QT INTERVAL: 324 MS
QTC CALCULATION(BAZETT): 440 MS
QTC FREDERICIA: 397 MS
R AXIS: 11 DEGREES
RBC # BLD AUTO: 4.57 X10*6/UL (ref 4–5.2)
SODIUM SERPL-SCNC: 136 MMOL/L (ref 136–145)
T AXIS: 22 DEGREES
T OFFSET: 383 MS
VENTRICULAR RATE: 111 BPM
WBC # BLD AUTO: 7.7 X10*3/UL (ref 4.4–11.3)

## 2024-10-30 PROCEDURE — 2500000004 HC RX 250 GENERAL PHARMACY W/ HCPCS (ALT 636 FOR OP/ED): Performed by: INTERNAL MEDICINE

## 2024-10-30 PROCEDURE — 82947 ASSAY GLUCOSE BLOOD QUANT: CPT

## 2024-10-30 PROCEDURE — 36415 COLL VENOUS BLD VENIPUNCTURE: CPT | Performed by: INTERNAL MEDICINE

## 2024-10-30 PROCEDURE — 97530 THERAPEUTIC ACTIVITIES: CPT | Mod: GO,CO

## 2024-10-30 PROCEDURE — 1200000002 HC GENERAL ROOM WITH TELEMETRY DAILY

## 2024-10-30 PROCEDURE — 2500000001 HC RX 250 WO HCPCS SELF ADMINISTERED DRUGS (ALT 637 FOR MEDICARE OP): Performed by: INTERNAL MEDICINE

## 2024-10-30 PROCEDURE — 97535 SELF CARE MNGMENT TRAINING: CPT | Mod: GO,CO

## 2024-10-30 PROCEDURE — 85027 COMPLETE CBC AUTOMATED: CPT | Performed by: INTERNAL MEDICINE

## 2024-10-30 PROCEDURE — 80048 BASIC METABOLIC PNL TOTAL CA: CPT | Performed by: INTERNAL MEDICINE

## 2024-10-30 PROCEDURE — 99232 SBSQ HOSP IP/OBS MODERATE 35: CPT | Performed by: INTERNAL MEDICINE

## 2024-10-30 ASSESSMENT — COGNITIVE AND FUNCTIONAL STATUS - GENERAL
DAILY ACTIVITIY SCORE: 13
MOVING TO AND FROM BED TO CHAIR: A LOT
PERSONAL GROOMING: A LITTLE
PERSONAL GROOMING: A LITTLE
WALKING IN HOSPITAL ROOM: TOTAL
MOVING FROM LYING ON BACK TO SITTING ON SIDE OF FLAT BED WITH BEDRAILS: A LITTLE
HELP NEEDED FOR BATHING: A LOT
TURNING FROM BACK TO SIDE WHILE IN FLAT BAD: A LOT
DRESSING REGULAR UPPER BODY CLOTHING: A LITTLE
HELP NEEDED FOR BATHING: A LOT
DRESSING REGULAR LOWER BODY CLOTHING: TOTAL
STANDING UP FROM CHAIR USING ARMS: A LOT
EATING MEALS: A LITTLE
TOILETING: TOTAL
CLIMB 3 TO 5 STEPS WITH RAILING: TOTAL
MOBILITY SCORE: 11
TOILETING: TOTAL
DRESSING REGULAR UPPER BODY CLOTHING: A LITTLE
EATING MEALS: A LITTLE
DRESSING REGULAR LOWER BODY CLOTHING: A LOT
DAILY ACTIVITIY SCORE: 14

## 2024-10-30 ASSESSMENT — PAIN SCALES - GENERAL
PAINLEVEL_OUTOF10: 0 - NO PAIN
PAINLEVEL_OUTOF10: 0 - NO PAIN
PAINLEVEL_OUTOF10: 4
PAINLEVEL_OUTOF10: 0 - NO PAIN

## 2024-10-30 ASSESSMENT — PAIN - FUNCTIONAL ASSESSMENT: PAIN_FUNCTIONAL_ASSESSMENT: 0-10

## 2024-10-30 ASSESSMENT — PAIN SCALES - WONG BAKER: WONGBAKER_NUMERICALRESPONSE: NO HURT

## 2024-10-30 ASSESSMENT — ACTIVITIES OF DAILY LIVING (ADL): HOME_MANAGEMENT_TIME_ENTRY: 9

## 2024-10-30 NOTE — NURSING NOTE
Assumed care of patient.  Pt resting comfortably after having been assisted with patient care.  Pt has no needs at this time. Call light within reach, bed alarm activated

## 2024-10-30 NOTE — PROGRESS NOTES
Occupational Therapy    OT Treatment    Patient Name: Marianela Green  MRN: [id number]  Department: [address]  Room: [facility]  Today's Date: 10/30/2024  Time Calculation  Start Time: 1308  Stop Time: 1331  Time Calculation (min): 23 min        Assessment:  OT Assessment: Tolerated session fairly, demonstrating minor progression towards POC with increased encouragement required. Pt would benefit from continued skilled OT services to improve strength, balance, and functional tolerance to increase independence with ADL tasks  End of Session Communication: Bedside nurse  End of Session Patient Position: Bed, 3 rail up, Alarm on  OT Assessment Results: Decreased ADL status, Decreased upper extremity strength, Decreased safe judgment during ADL, Decreased cognition, Decreased endurance, Decreased functional mobility, Decreased gross motor control, Decreased trunk control for functional activities  Plan:  Treatment Interventions: ADL retraining, Functional transfer training, UE strengthening/ROM, Endurance training, Patient/family training, Equipment evaluation/education, Neuromuscular reeducation, Compensatory technique education  OT Frequency: 4 times per week  OT Discharge Recommendations: Moderate intensity level of continued care  OT Recommended Transfer Status: Assist of 2  OT - OK to Discharge: Yes  Treatment Interventions: ADL retraining, Functional transfer training, UE strengthening/ROM, Endurance training, Patient/family training, Equipment evaluation/education, Neuromuscular reeducation, Compensatory technique education    Subjective   Previous Visit Info:  OT Last Visit  OT Received On: 10/30/24  General:  General  Prior to Session Communication: Bedside nurse  Patient Position Received: Bed, 3 rail up, Alarm off, not on at start of session  General Comment: Cleared for therapy per RN. Pt supine in bed upon arrival and agreeable to tx  Precautions:  Hearing/Visual Limitations: glasses  Medical Precautions:  Fall precautions    Pain:  Pain Assessment  Pain Assessment: 0-10  0-10 (Numeric) Pain Score: 4  Pain Type: Acute pain  Pain Location: Knee  Pain Orientation: Right, Left  Pain Interventions: Repositioned    Objective    Cognition:  Cognition  Overall Cognitive Status: Impaired  Orientation Level: Disoriented to situation, Disoriented to time  Safety Judgment: Decreased awareness of need for safety precautions  Safety/Judgement: Exceptions to WFL  Insight: Moderate  Processing Speed: Delayed    Activities of Daily Living:    UE Dressing  UE Dressing Level of Assistance: Minimum assistance  UE Dressing Where Assessed: Edge of bed  UE Dressing Comments: don/doff gown    Toileting  Toileting Level of Assistance: Dependent  Where Assessed: Bed level  Toileting Comments: demonstrating bowel incontinence upon stand, requiring dependent pericare hygiene    Bed Mobility/Transfers: Bed Mobility  Bed Mobility: Yes  Bed Mobility 1  Bed Mobility 1: Supine to sitting  Level of Assistance 1: Moderate assistance, Moderate verbal cues  Bed Mobility Comments 1: assist with BLE advancement and trunk elevation with cues to scoot hips to EOB with increased time + effort required to complete supine>sit  Bed Mobility 2  Bed Mobility  2: Sitting to supine  Level of Assistance 2: Moderate assistance  Bed Mobility Comments 2: assist to lift BLE on bed with cues to adjust trunk to midline, dependent to boost to HOB with use of drawsheet  Bed Mobility 3  Bed Mobility 3: Rolling right, Rolling left  Level of Assistance 3: Minimum assistance, Moderate verbal cues  Bed Mobility Comments 3: assist to roll L/R for pericare hygiene and linen adjustment    Transfers  Transfer: Yes  Transfer 1  Technique 1: Sit to stand, Stand to sit  Transfer Device 1: Walker  Transfer Level of Assistance 1: Maximum assistance, Moderate verbal cues  Trials/Comments 1: sit>stand x2 with assist for trunk elevation and forward weightshifting with cues for proper hand  placement with pt able to minimally elevate trunk off EOB with continuous cues for postural alignment with pt unable to demonstrate errect posture. Cues to attempt steps towards HOB however unsuccessful    Standing Balance:  Static Standing Balance  Static Standing-Level of Assistance: Maximum assistance  Static Standing-Comment/Number of Minutes: poor standing balance during transfer task    Therapy/Activity:    Therapeutic Activity  Therapeutic Activity Performed: Yes  Therapeutic Activity 1: tolerated sitting EOB ~12 min during transfer attempts and expressing increased LENNY knee pain    Outcome Measures:[facility] Daily Activity  Putting on and taking off regular lower body clothing: Total  Bathing (including washing, rinsing, drying): A lot  Putting on and taking off regular upper body clothing: A little  Toileting, which includes using toilet, bedpan or urinal: Total  Taking care of personal grooming such as brushing teeth: A little  Eating Meals: A little  Daily Activity - Total Score: 13    Education Documentation  Body Mechanics, taught by JANAY Shaw at 10/30/2024  2:22 PM.  Learner: Patient  Readiness: Acceptance  Method: Explanation  Response: Verbalizes Understanding, Needs Reinforcement    Home Exercise Program, taught by JANAY Shaw at 10/30/2024  2:22 PM.  Learner: Patient  Readiness: Acceptance  Method: Explanation  Response: Verbalizes Understanding, Needs Reinforcement    ADL Training, taught by JANAY Shaw at 10/30/2024  2:22 PM.  Learner: Patient  Readiness: Acceptance  Method: Explanation  Response: Verbalizes Understanding, Needs Reinforcement    Problem: OT Goals  Goal: Pt will complete self-feeding with mod indep/indep.  Outcome: Progressing  Goal: Pt will complete all grooming tasks with setup while seated.  Outcome: Progressing  Goal: Pt will tolerate sitting EOB for at least 10-15 minutes during therex and/or ADL's.  Outcome: Progressing  Goal: Pt  will complete all functional transfers from bed to wheelchair/chair/bedside commode with mod A using a device as recommended by skilled PT.  Outcome: Progressing  Goal: Pt will complete UB bathing with min A to setup.  Outcome: Progressing

## 2024-10-30 NOTE — PROGRESS NOTES
"Nutrition Follow up Note    Nutrition Assessment      Patient doing well, po intake improving. DC to SNF pending precert.    Nutrition History:     Energy Intake: Fair 50-75 %  Food Allergies/Intolerances:  None  GI Symptoms: None  Oral Problems: None    Anthropometrics:  Ht: 160 cm (5' 3\"), Wt: 97.3 kg (214 lb 8.1 oz), BMI: 38.01  IBW/kg (Dietitian Calculated): 52.3 kg  Percent of IBW: 182 %     Weight Change:  Daily Weight  10/29/24 : 97.3 kg (214 lb 8.1 oz)  05/17/22 : 90.3 kg (199 lb)  09/18/21 : 86.2 kg (190 lb)  05/06/20 : 82.6 kg (181 lb 15.8 oz)  02/13/20 : 84.4 kg (186 lb)  02/03/20 : 83.5 kg (184 lb)     Nutrition Focused Physical Exam Findings:      Edema  Edema: +1 trace  Edema Location: Bilateral lower extremities    Physical Findings (Nutrition Deficiency/Toxicity)  Skin: Positive (Wounds to left buttocks and left leg)    Nutrition Significant Labs:  Lab Results   Component Value Date    WBC 7.7 10/30/2024    HGB 13.2 10/30/2024    HCT 40.3 10/30/2024     10/30/2024    CHOL 171 02/03/2020    TRIG 141 02/03/2020    HDL 58.2 02/03/2020    ALT 8 10/25/2024    AST 9 10/25/2024     10/30/2024    K 4.1 10/30/2024     10/30/2024    CREATININE 0.73 10/30/2024    BUN 14 10/30/2024    CO2 29 10/30/2024    TSH 1.71 05/17/2022    HGBA1C 8.6 (H) 10/27/2024     Nutrition Specific Medications:  cefTRIAXone, 1 g, intravenous, q24h  docusate sodium, 100 mg, oral, BID  enoxaparin, 40 mg, subcutaneous, Daily  insulin lispro, 0-5 Units, subcutaneous, TID  losartan, 100 mg, oral, Daily  metFORMIN (MOD), 1,000 mg, oral, BID  metoprolol succinate XL, 50 mg, oral, Daily  nystatin, , Topical, q8h  pantoprazole, 40 mg, oral, Daily   Or  pantoprazole, 40 mg, intravenous, Daily  polyethylene glycol, 17 g, oral, Daily         Dietary Orders (From admission, onward)       Start     Ordered    10/24/24 1812  Adult diet Consistent Carb; CCD 60 gm/meal  Diet effective now        Question Answer Comment   Diet type " Consistent Carb    Carb diet selection: CCD 60 gm/meal        10/24/24 1811    10/24/24 1752  May Participate in Room Service  ( ROOM SERVICE MAY PARTICIPATE)  Once        Question:  .  Answer:  Yes    10/24/24 1751                   Estimated Needs:   Estimated Energy Needs  Total Energy Estimated Needs (kCal): 1569 kCal  Total Estimated Energy Need per Day (kCal/kg): 30 kCal/kg  Method for Estimating Needs: IBW    Estimated Protein Needs  Total Protein Estimated Needs (g): 78 g  Total Protein Estimated Needs (g/kg): 1.5 g/kg  Method for Estimating Needs: IBW    Estimated Fluid Needs  Total Fluid Estimated Needs (mL): 1569 mL  Total Fluid Estimated Needs (mL/kg): 30 mL/kg  Method for Estimating Needs: IBW        Nutrition Diagnosis   Nutrition Diagnosis:  Malnutrition Diagnosis  Patient has Malnutrition Diagnosis: No    Nutrition Diagnosis  Patient has Nutrition Diagnosis: Yes  Diagnosis Status (1): Ongoing  Nutrition Diagnosis 1: Increased nutrient needs  Related to (1): wound healing  As Evidenced by (1): wounds to buttocks and left leg       Nutrition Interventions/Recommendations   Nutrition Interventions and Recommendations:    Nutrition Prescription:  Individualized Nutrition Prescription Provided for : Recommend [id number] kcal and 62-78 gm protein daily via PO intakes    Nutrition Interventions:   Food and/or Nutrient Delivery Interventions  Interventions: Meals and snacks  Meals and Snacks: Carbohydrate-modified diet  Goal: provide as ordered    Education Documentation  No documentation found.         Nutrition Monitoring and Evaluation   Monitoring/Evaluation:   Food/Nutrient Related History Monitoring  Monitoring and Evaluation Plan: Energy intake  Energy Intake: Estimated energy intake  Criteria: pt to consume >/= 75% estimated needs    Body Composition/Growth/Weight History  Monitoring and Evaluation Plan: Weight  Weight: Measured weight  Criteria: Maintain stable weight    Biochemical Data, Medical  Tests and Procedures  Monitoring and Evaluation Plan: Electrolyte/renal panel    Nutrition Focused Physical Findings  Monitoring and Evaluation Plan: Skin  Skin: Other (Comment)  Criteria: Monitor skin integrity       Time Spent/Follow-up:   Follow Up  Time Spent (min): 25 minutes  Last Date of Nutrition Visit: 10/30/24  Nutrition Follow-Up Needed?: 5-7 days  Follow up Comment: 11/5/24

## 2024-10-30 NOTE — PROGRESS NOTES
Marianela Green is a 74 y.o. female on day 6 of admission presenting with Frequent falls.    Patient requesting to complete POA paperwork. Completed POA paperwork with patient and daughter. Copies given to patient and copy in patient's chart.       Erma Marques RN

## 2024-10-30 NOTE — PROGRESS NOTES
10/30/24 1117   Discharge Planning   Expected Discharge Disposition SNF     Peer to peer incomplete by physician   Fast appeal submitted at this time, clinicals faxed to [phone]  Appeal can take up to 72hrs to receive decision     UPDATE 1400: daughter loreta updated and agreeable at this time    ** do not discharge without speaking to care coordination**

## 2024-10-30 NOTE — CARE PLAN
The patient's goals for the shift include Maintain safety    The clinical goals for the shift include pt will remain safe and free from injury this shift        Problem: Pain - Adult  Goal: Verbalizes/displays adequate comfort level or baseline comfort level  Outcome: Progressing     Problem: Safety - Adult  Goal: Free from fall injury  Outcome: Progressing     Problem: Discharge Planning  Goal: Discharge to home or other facility with appropriate resources  Outcome: Progressing     Problem: Chronic Conditions and Co-morbidities  Goal: Patient's chronic conditions and co-morbidity symptoms are monitored and maintained or improved  Outcome: Progressing     Problem: Skin  Goal: Decreased wound size/increased tissue granulation at next dressing change  Outcome: Progressing  Flowsheets (Taken 10/30/2024 0222)  Decreased wound size/increased tissue granulation at next dressing change:   Protective dressings over bony prominences   Promote sleep for wound healing   Utilize specialty bed per algorithm  Goal: Participates in plan/prevention/treatment measures  Outcome: Progressing  Flowsheets (Taken 10/30/2024 0222)  Participates in plan/prevention/treatment measures:   Elevate heels   Discuss with provider PT/OT consult  Goal: Prevent/manage excess moisture  Outcome: Progressing  Flowsheets (Taken 10/30/2024 0222)  Prevent/manage excess moisture:   Cleanse incontinence/protect with barrier cream   Moisturize dry skin  Goal: Prevent/minimize sheer/friction injuries  Outcome: Progressing  Flowsheets (Taken 10/30/2024 0222)  Prevent/minimize sheer/friction injuries:   Use pull sheet   HOB 30 degrees or less   Utilize specialty bed per algorithm   Turn/reposition every 2 hours/use positioning/transfer devices  Goal: Promote/optimize nutrition  Outcome: Progressing  Flowsheets (Taken 10/30/2024 0222)  Promote/optimize nutrition:   Monitor/record intake including meals   Consume > 50% meals/supplements   Offer  water/supplements/favorite foods  Goal: Promote skin healing  Outcome: Progressing  Flowsheets (Taken 10/30/2024 0222)  Promote skin healing:   Assess skin/pad under line(s)/device(s)   Turn/reposition every 2 hours/use positioning/transfer devices   Protective dressings over bony prominences     Problem: Pain  Goal: Takes deep breaths with improved pain control throughout the shift  Outcome: Progressing  Goal: Turns in bed with improved pain control throughout the shift  Outcome: Progressing  Goal: Walks with improved pain control throughout the shift  Outcome: Progressing  Goal: Performs ADL's with improved pain control throughout shift  Outcome: Progressing  Goal: Participates in PT with improved pain control throughout the shift  Outcome: Progressing  Goal: Free from opioid side effects throughout the shift  Outcome: Progressing  Goal: Free from acute confusion related to pain meds throughout the shift  Outcome: Progressing     Problem: Fall/Injury  Goal: Not fall by end of shift  Outcome: Progressing  Goal: Be free from injury by end of the shift  Outcome: Progressing  Goal: Verbalize understanding of personal risk factors for fall in the hospital  Outcome: Progressing  Goal: Verbalize understanding of risk factor reduction measures to prevent injury from fall in the home  Outcome: Progressing  Goal: Use assistive devices by end of the shift  Outcome: Progressing  Goal: Pace activities to prevent fatigue by end of the shift  Outcome: Progressing     Problem: Nutrition  Goal: Oral intake greater 75%  Outcome: Progressing

## 2024-10-30 NOTE — PROGRESS NOTES
"Marianela Green is a 74 y.o. female on day 6 of admission presenting with Frequent falls.    Subjective   Patient is feeling better.  She is eating little better today.  Still remains weak and not able to ambulate on her own.  Blood pressure and blood sugar improving     Objective     Physical Exam  Vitals reviewed.   Constitutional:       Appearance: Normal appearance.   HENT:      Head: Normocephalic and atraumatic.      Right Ear: Tympanic membrane, ear canal and external ear normal.      Left Ear: Tympanic membrane, ear canal and external ear normal.      Nose: Nose normal.      Mouth/Throat:      Pharynx: Oropharynx is clear.   Eyes:      Extraocular Movements: Extraocular movements intact.      Conjunctiva/sclera: Conjunctivae normal.      Pupils: Pupils are equal, round, and reactive to light.   Cardiovascular:      Rate and Rhythm: Normal rate and regular rhythm.      Pulses: Normal pulses.      Heart sounds: Normal heart sounds.   Pulmonary:      Effort: Pulmonary effort is normal.      Breath sounds: Normal breath sounds.   Abdominal:      General: Abdomen is flat. Bowel sounds are normal.      Palpations: Abdomen is soft.   Musculoskeletal:      Cervical back: Normal range of motion and neck supple.   Skin:     General: Skin is warm and dry.      Comments:   Fungal rash in skin fold   Neurological:      General: No focal deficit present.      Mental Status: She is alert and oriented to person, place, and time.   Psychiatric:         Mood and Affect: Mood normal.         Last Recorded Vitals  Blood pressure 141/64, pulse 82, temperature 36.4 °C (97.5 °F), temperature source Oral, resp. rate 16, height 1.6 m (5' 3\"), weight 97.3 kg (214 lb 8.1 oz), SpO2 96%.  Intake/Output last 3 Shifts:  I/O last 3 completed shifts:  In: 180 (1.8 mL/kg) [P.O.:180]  Out: 1900 (19.5 mL/kg) [Urine:1900 (0.5 mL/kg/hr)]  Weight: 97.3 kg     Relevant Results               Scheduled medications  cefTRIAXone, 1 g, intravenous, " q24h  docusate sodium, 100 mg, oral, BID  enoxaparin, 40 mg, subcutaneous, Daily  insulin lispro, 0-5 Units, subcutaneous, TID  losartan, 100 mg, oral, Daily  metFORMIN (MOD), 1,000 mg, oral, BID  metoprolol succinate XL, 50 mg, oral, Daily  nystatin, , Topical, q8h  pantoprazole, 40 mg, oral, Daily   Or  pantoprazole, 40 mg, intravenous, Daily  polyethylene glycol, 17 g, oral, Daily      Continuous medications     PRN medications  PRN medications: acetaminophen **OR** acetaminophen **OR** acetaminophen, benzocaine-menthol, dextromethorphan-guaifenesin, guaiFENesin, melatonin, ondansetron **OR** ondansetron  Results for orders placed or performed during the hospital encounter of 10/24/24 (from the past 24 hours)   POCT GLUCOSE   Result Value Ref Range    POCT Glucose 156 (H) 74 - 99 mg/dL   POCT GLUCOSE   Result Value Ref Range    POCT Glucose 126 (H) 74 - 99 mg/dL   CBC   Result Value Ref Range    WBC 7.7 4.4 - 11.3 x10*3/uL    nRBC 0.0 0.0 - 0.0 /100 WBCs    RBC 4.57 4.00 - 5.20 x10*6/uL    Hemoglobin 13.2 12.0 - 16.0 g/dL    Hematocrit 40.3 36.0 - 46.0 %    MCV 88 80 - 100 fL    MCH 28.9 26.0 - 34.0 pg    MCHC 32.8 32.0 - 36.0 g/dL    RDW 13.4 11.5 - 14.5 %    Platelets 338 150 - 450 x10*3/uL   Basic Metabolic Panel   Result Value Ref Range    Glucose 146 (H) 74 - 99 mg/dL    Sodium 136 136 - 145 mmol/L    Potassium 4.1 3.5 - 5.3 mmol/L    Chloride 101 98 - 107 mmol/L    Bicarbonate 29 21 - 32 mmol/L    Anion Gap 10 10 - 20 mmol/L    Urea Nitrogen 14 6 - 23 mg/dL    Creatinine 0.73 0.50 - 1.05 mg/dL    eGFR 86 >60 mL/min/1.73m*2    Calcium 9.0 8.6 - 10.3 mg/dL   POCT GLUCOSE   Result Value Ref Range    POCT Glucose 133 (H) 74 - 99 mg/dL   POCT GLUCOSE   Result Value Ref Range    POCT Glucose 129 (H) 74 - 99 mg/dL     No results found.                 Assessment/Plan   Assessment & Plan  Frequent falls    Bilateral lower leg cellulitis    Candidiasis of skin    Elevated blood sugar level    Hypertension    Failure  to thrive (child)    COPD (chronic obstructive pulmonary disease) (Multi)    Abnormal chest x-ray    UTI (urinary tract infection)    Leg cellulitis is almost resolved  Urine cultures growing E. coli  Continue Rocephin susceptible  continue nystatin cream for the fungal rash  Blood pressure is doing better  COPD stable  A1c 8.6 increase metformin 1000 mg twice a day  Continue sliding scale insulin for elevated blood sugar  Increase metformin twice a day  CT lung shows nodules  Nodules which needs follow-up  Physical therapy recommended rehab  Plan for rehab when arrangements made.   involved       I spent 2 minutes in the professional and overall care of this patient.      Shelbie Ling MD

## 2024-10-31 LAB
ANION GAP SERPL CALCULATED.3IONS-SCNC: 11 MMOL/L (ref 10–20)
BUN SERPL-MCNC: 10 MG/DL (ref 6–23)
CALCIUM SERPL-MCNC: 8.9 MG/DL (ref 8.6–10.3)
CHLORIDE SERPL-SCNC: 100 MMOL/L (ref 98–107)
CO2 SERPL-SCNC: 29 MMOL/L (ref 21–32)
CREAT SERPL-MCNC: 0.56 MG/DL (ref 0.5–1.05)
EGFRCR SERPLBLD CKD-EPI 2021: >90 ML/MIN/1.73M*2
ERYTHROCYTE [DISTWIDTH] IN BLOOD BY AUTOMATED COUNT: 13.4 % (ref 11.5–14.5)
GLUCOSE BLD MANUAL STRIP-MCNC: 129 MG/DL (ref 74–99)
GLUCOSE BLD MANUAL STRIP-MCNC: 130 MG/DL (ref 74–99)
GLUCOSE BLD MANUAL STRIP-MCNC: 135 MG/DL (ref 74–99)
GLUCOSE BLD MANUAL STRIP-MCNC: 151 MG/DL (ref 74–99)
GLUCOSE SERPL-MCNC: 190 MG/DL (ref 74–99)
HCT VFR BLD AUTO: 41.9 % (ref 36–46)
HGB BLD-MCNC: 13.8 G/DL (ref 12–16)
MCH RBC QN AUTO: 29.1 PG (ref 26–34)
MCHC RBC AUTO-ENTMCNC: 32.9 G/DL (ref 32–36)
MCV RBC AUTO: 88 FL (ref 80–100)
NRBC BLD-RTO: 0 /100 WBCS (ref 0–0)
PLATELET # BLD AUTO: 387 X10*3/UL (ref 150–450)
POTASSIUM SERPL-SCNC: 3.7 MMOL/L (ref 3.5–5.3)
RBC # BLD AUTO: 4.74 X10*6/UL (ref 4–5.2)
SODIUM SERPL-SCNC: 136 MMOL/L (ref 136–145)
WBC # BLD AUTO: 8.2 X10*3/UL (ref 4.4–11.3)

## 2024-10-31 PROCEDURE — 82374 ASSAY BLOOD CARBON DIOXIDE: CPT | Performed by: INTERNAL MEDICINE

## 2024-10-31 PROCEDURE — 2500000001 HC RX 250 WO HCPCS SELF ADMINISTERED DRUGS (ALT 637 FOR MEDICARE OP): Performed by: INTERNAL MEDICINE

## 2024-10-31 PROCEDURE — 99231 SBSQ HOSP IP/OBS SF/LOW 25: CPT | Performed by: INTERNAL MEDICINE

## 2024-10-31 PROCEDURE — 85027 COMPLETE CBC AUTOMATED: CPT | Performed by: INTERNAL MEDICINE

## 2024-10-31 PROCEDURE — 2500000004 HC RX 250 GENERAL PHARMACY W/ HCPCS (ALT 636 FOR OP/ED): Performed by: INTERNAL MEDICINE

## 2024-10-31 PROCEDURE — 97110 THERAPEUTIC EXERCISES: CPT | Mod: GP

## 2024-10-31 PROCEDURE — 82947 ASSAY GLUCOSE BLOOD QUANT: CPT

## 2024-10-31 PROCEDURE — 36415 COLL VENOUS BLD VENIPUNCTURE: CPT | Performed by: INTERNAL MEDICINE

## 2024-10-31 PROCEDURE — 97530 THERAPEUTIC ACTIVITIES: CPT | Mod: GP

## 2024-10-31 PROCEDURE — 1200000002 HC GENERAL ROOM WITH TELEMETRY DAILY

## 2024-10-31 ASSESSMENT — PAIN - FUNCTIONAL ASSESSMENT: PAIN_FUNCTIONAL_ASSESSMENT: 0-10

## 2024-10-31 ASSESSMENT — COGNITIVE AND FUNCTIONAL STATUS - GENERAL
WALKING IN HOSPITAL ROOM: TOTAL
TOILETING: TOTAL
MOVING TO AND FROM BED TO CHAIR: A LOT
MOVING TO AND FROM BED TO CHAIR: A LOT
DAILY ACTIVITIY SCORE: 14
TURNING FROM BACK TO SIDE WHILE IN FLAT BAD: A LOT
PERSONAL GROOMING: A LITTLE
DAILY ACTIVITIY SCORE: 14
HELP NEEDED FOR BATHING: A LOT
MOBILITY SCORE: 11
CLIMB 3 TO 5 STEPS WITH RAILING: TOTAL
EATING MEALS: A LITTLE
MOBILITY SCORE: 11
STANDING UP FROM CHAIR USING ARMS: A LOT
MOBILITY SCORE: 11
TURNING FROM BACK TO SIDE WHILE IN FLAT BAD: A LITTLE
PERSONAL GROOMING: A LITTLE
HELP NEEDED FOR BATHING: A LOT
DRESSING REGULAR UPPER BODY CLOTHING: A LITTLE
DRESSING REGULAR LOWER BODY CLOTHING: A LOT
MOVING FROM LYING ON BACK TO SITTING ON SIDE OF FLAT BED WITH BEDRAILS: A LITTLE
MOVING FROM LYING ON BACK TO SITTING ON SIDE OF FLAT BED WITH BEDRAILS: A LITTLE
DRESSING REGULAR LOWER BODY CLOTHING: A LOT
MOVING TO AND FROM BED TO CHAIR: TOTAL
TURNING FROM BACK TO SIDE WHILE IN FLAT BAD: A LOT
MOVING FROM LYING ON BACK TO SITTING ON SIDE OF FLAT BED WITH BEDRAILS: A LITTLE
CLIMB 3 TO 5 STEPS WITH RAILING: TOTAL
EATING MEALS: A LITTLE
WALKING IN HOSPITAL ROOM: TOTAL
STANDING UP FROM CHAIR USING ARMS: A LOT
WALKING IN HOSPITAL ROOM: TOTAL
CLIMB 3 TO 5 STEPS WITH RAILING: TOTAL
STANDING UP FROM CHAIR USING ARMS: A LOT
TOILETING: TOTAL
DRESSING REGULAR UPPER BODY CLOTHING: A LITTLE

## 2024-10-31 ASSESSMENT — PAIN SCALES - GENERAL
PAINLEVEL_OUTOF10: 0 - NO PAIN
PAINLEVEL_OUTOF10: 0 - NO PAIN

## 2024-10-31 NOTE — RESEARCH NOTES
Artificial Intelligence Monitoring in Nursing (AIMS Nursing) Study    Principle Investigator - Dr. Stephane Pittman  Research Coordinator - Porsche Soler     Patient Name - Marianela Green  Date - 10/31/2024 11:27 AM  Location - [facility]    Marianela Green was approached by Porsche Soler to talk about participating in the AIMS Nursing Study. The consent form was signed by the patient and they were given a copy for their records. Study protocol was followed and patient was given study contact information.     Porsche Soler

## 2024-10-31 NOTE — PROGRESS NOTES
"Marianela Green is a 74 y.o. female on day 7 of admission presenting with Frequent falls.    Subjective   Patient is feeling better.  She is eating little better today.  Still remains weak and not able to ambulate on her own.  Blood pressure and blood sugar improving     Objective     Physical Exam  Vitals reviewed.   Constitutional:       Appearance: Normal appearance.   HENT:      Head: Normocephalic and atraumatic.      Right Ear: Tympanic membrane, ear canal and external ear normal.      Left Ear: Tympanic membrane, ear canal and external ear normal.      Nose: Nose normal.      Mouth/Throat:      Pharynx: Oropharynx is clear.   Eyes:      Extraocular Movements: Extraocular movements intact.      Conjunctiva/sclera: Conjunctivae normal.      Pupils: Pupils are equal, round, and reactive to light.   Cardiovascular:      Rate and Rhythm: Normal rate and regular rhythm.      Pulses: Normal pulses.      Heart sounds: Normal heart sounds.   Pulmonary:      Effort: Pulmonary effort is normal.      Breath sounds: Normal breath sounds.   Abdominal:      General: Abdomen is flat. Bowel sounds are normal.      Palpations: Abdomen is soft.   Musculoskeletal:      Cervical back: Normal range of motion and neck supple.   Skin:     General: Skin is warm and dry.      Comments:   Fungal rash in skin fold   Neurological:      General: No focal deficit present.      Mental Status: She is alert and oriented to person, place, and time.   Psychiatric:         Mood and Affect: Mood normal.         Last Recorded Vitals  Blood pressure 145/62, pulse 71, temperature 36 °C (96.8 °F), temperature source Oral, resp. rate 16, height 1.6 m (5' 3\"), weight 97.3 kg (214 lb 8.1 oz), SpO2 93%.  Intake/Output last 3 Shifts:  I/O last 3 completed shifts:  In: 470 (4.8 mL/kg) [P.O.:420; IV Piggyback:50]  Out: 3900 (40.1 mL/kg) [Urine:3900 (1.1 mL/kg/hr)]  Weight: 97.3 kg     Relevant Results               Scheduled medications  cefTRIAXone, 1 " g, intravenous, q24h  docusate sodium, 100 mg, oral, BID  enoxaparin, 40 mg, subcutaneous, Daily  insulin lispro, 0-5 Units, subcutaneous, TID  losartan, 100 mg, oral, Daily  metFORMIN (MOD), 1,000 mg, oral, BID  metoprolol succinate XL, 50 mg, oral, Daily  nystatin, , Topical, q8h  pantoprazole, 40 mg, oral, Daily   Or  pantoprazole, 40 mg, intravenous, Daily  polyethylene glycol, 17 g, oral, Daily      Continuous medications     PRN medications  PRN medications: acetaminophen **OR** acetaminophen **OR** acetaminophen, benzocaine-menthol, dextromethorphan-guaifenesin, guaiFENesin, melatonin, ondansetron **OR** ondansetron  Results for orders placed or performed during the hospital encounter of 10/24/24 (from the past 24 hours)   POCT GLUCOSE   Result Value Ref Range    POCT Glucose 181 (H) 74 - 99 mg/dL   CBC   Result Value Ref Range    WBC 8.2 4.4 - 11.3 x10*3/uL    nRBC 0.0 0.0 - 0.0 /100 WBCs    RBC 4.74 4.00 - 5.20 x10*6/uL    Hemoglobin 13.8 12.0 - 16.0 g/dL    Hematocrit 41.9 36.0 - 46.0 %    MCV 88 80 - 100 fL    MCH 29.1 26.0 - 34.0 pg    MCHC 32.9 32.0 - 36.0 g/dL    RDW 13.4 11.5 - 14.5 %    Platelets 387 150 - 450 x10*3/uL   Basic Metabolic Panel   Result Value Ref Range    Glucose 190 (H) 74 - 99 mg/dL    Sodium 136 136 - 145 mmol/L    Potassium 3.7 3.5 - 5.3 mmol/L    Chloride 100 98 - 107 mmol/L    Bicarbonate 29 21 - 32 mmol/L    Anion Gap 11 10 - 20 mmol/L    Urea Nitrogen 10 6 - 23 mg/dL    Creatinine 0.56 0.50 - 1.05 mg/dL    eGFR >90 >60 mL/min/1.73m*2    Calcium 8.9 8.6 - 10.3 mg/dL   POCT GLUCOSE   Result Value Ref Range    POCT Glucose 129 (H) 74 - 99 mg/dL   POCT GLUCOSE   Result Value Ref Range    POCT Glucose 135 (H) 74 - 99 mg/dL   POCT GLUCOSE   Result Value Ref Range    POCT Glucose 130 (H) 74 - 99 mg/dL     No results found.                 Assessment/Plan   Assessment & Plan  Frequent falls    Bilateral lower leg cellulitis    Candidiasis of skin    Elevated blood sugar  level    Hypertension    Failure to thrive (child)    COPD (chronic obstructive pulmonary disease) (Multi)    Abnormal chest x-ray    UTI (urinary tract infection)    Leg cellulitis is  resolved  Urine cultures growing E. coli  Continue Rocephin susceptible  continue nystatin cream for the fungal rash improving  Blood pressure is doing better  COPD stable  A1c 8.6    Continue sliding scale insulin for elevated blood sugar  Increase metformin twice a day  CT lung shows nodules  Nodules which needs follow-up  Physical therapy recommended rehab  Plan for rehab when arrangements made.   involved and appeal is made       I spent 2 minutes in the professional and overall care of this patient.      Shelbie Ling MD

## 2024-10-31 NOTE — PROGRESS NOTES
Physical Therapy    Physical Therapy Treatment    Patient Name: Marianela Green  MRN: [id number]  Department: [address]  Room: [address] [place] [address]  Today's Date: 10/31/2024  Time Calculation  Start Time: 1000  Stop Time: 1030  Time Calculation (min): 30 min         Assessment/Plan   PT Assessment  PT Assessment Results: Decreased strength, Decreased endurance, Impaired balance, Decreased mobility, Decreased safety awareness, Decreased skin integrity, Obesity, Decreased range of motion, Pain  Rehab Prognosis: Fair  Barriers to Discharge: home alone, not able to care for self, max A x 2 for limited mobility  Evaluation/Treatment Tolerance: Patient tolerated treatment well  Medical Staff Made Aware: Yes  Strengths: Support of extended family/friends, Attitude of self  Barriers to Participation: Ability to acquire knowledge, Comorbidities  End of Session Communication: Bedside nurse  Assessment Comment: pt req max A x 2 for transfers this session, unsafe to attempt ambulation or chair transfer due to weakness and pain; pt would benefit from continued skilled PT prior to and upon discharge to address functional deficits in order to maximize functional performance and return to PLOF  End of Session Patient Position: Bed, 3 rail up, Alarm on (call bell in reach, all needs met. RN aware)  PT Plan  Inpatient/Swing Bed or Outpatient: Inpatient  PT Plan  Treatment/Interventions: Bed mobility, Transfer training, Gait training, Balance training, Strengthening, Endurance training, Range of motion, Therapeutic exercise, Therapeutic activity, Home exercise program, Postural re-education  PT Plan: Ongoing PT  PT Frequency: 4 times per week  PT Discharge Recommendations: Moderate intensity level of continued care  Equipment Recommended upon Discharge: Wheeled walker, Wheelchair  PT Recommended Transfer Status: Assist x2  PT - OK to Discharge: Yes (with skilled PT at next level of care)      General Visit Information:   PT  Visit  PT Received  On: 10/31/24  Response to Previous Treatment: Patient with no complaints from previous session.  General  Reason for Referral: Impaired ADL's/mobility, frequent falls  Referred By: Dr. NICHOLAS Ling  Past Medical History Relevant to Rehab: asthma, HTN, brain tumor  Family/Caregiver Present: No  Prior to Session Communication: Bedside nurse  Patient Position Received: Bed, 3 rail up, Alarm off, not on at start of session  Preferred Learning Style: auditory, verbal  General Comment: pt cleared for therapy via RN, agreeable to participate; (+) tele and purewick (dislodged at start of session; RN informed)    Subjective   Precautions:  Precautions  Medical Precautions: Fall precautions      Objective   Pain:  Pain Assessment  Pain Assessment: 0-10  0-10 (Numeric) Pain Score: 0 - No pain  Cognition:  Cognition  Overall Cognitive Status: Impaired  Orientation Level: Disoriented to time (unsure of month or season; reoriented)  Safety Judgment: Decreased awareness of need for safety precautions  Insight: Mild  Coordination:  Movements are Fluid and Coordinated: No  Coordination Comment: decreased rate/accuracy of movement  Postural Control:  Postural Control  Postural Control: Impaired  Head Control: significant lumbar lordosis with anterior pelvic tilt in standing with RW; fwd head, rounded shoulders sitting on EOB  Static Sitting Balance  Static Sitting-Balance Support: Bilateral upper extremity supported, Feet supported  Static Sitting-Level of Assistance: Close supervision  Static Sitting-Comment/Number of Minutes: pt sat on EOB with good static sitting balance  Dynamic Sitting Balance  Dynamic Sitting-Balance Support: Bilateral upper extremity supported  Dynamic Sitting-Level of Assistance: Contact guard  Dynamic Sitting-Comments: pt performed ther ex with CGA for safety and stability sitting on EOB; intermittent posterior lean with correction with VCs and TCs  Static Standing Balance  Static Standing-Balance Support:  Bilateral upper extremity supported (on RW)  Static Standing-Level of Assistance: Maximum assistance (x2)  Static Standing-Comment/Number of Minutes: pt stood with poor static standing balance by EOB with max A x 2 for stability and safety; VCs and TCs for activation of gluts/quads for upright standing; pt maintained ramiro hands on RW, despite VCs for safe hand placement;  Extremity/Trunk Assessments:  RLE   RLE : Exceptions to WFL (grossly >/=2+/5)  LLE   LLE : Exceptions to WFL (>/= 2+/5)  Activity Tolerance:  Activity Tolerance  Endurance: Decreased tolerance for upright activites  Activity Tolerance Comments: limited due to weakness and pain in B knees  Rate of Perceived Exertion (RPE): 5/10  Treatments:  Therapeutic Exercise  Therapeutic Exercise Performed: Yes  Therapeutic Exercise Activity 1: seated APs x 20 reps ramiro  Therapeutic Exercise Activity 2: seated LAQs x20 reps total (limited AROM noted)  Therapeutic Exercise Activity 3: seated hip flexion x 20 reps total (limited hip flexion AROM noted)              Bed Mobility  Bed Mobility: Yes  Bed Mobility 1  Bed Mobility 1: Supine to sitting  Level of Assistance 1: Close supervision  Bed Mobility Comments 1: supine > sit with mod I and use bed rails;  CS for safety; increased rate and time for transition  Bed Mobility 2  Bed Mobility  2: Sitting to supine  Level of Assistance 2: Moderate assistance  Bed Mobility Comments 2: mod A x 1 for B LEs onto bed; dependent boost to HOB  Bed Mobility 3  Bed Mobility 3: Rolling right, Rolling left  Level of Assistance 3: Minimum assistance, Moderate verbal cues  Bed Mobility Comments 3: rolling R/L with min A x 1 and use of bed rail    Ambulation/Gait Training  Ambulation/Gait Training Performed: No (unsafe to attempt)  Transfers  Transfer: Yes  Transfer 1  Transfer From 1: Sit to, Stand to  Transfer to 1: Stand, Sit  Technique 1: Sit to stand, Stand to sit  Transfer Device 1: Walker  Transfer Level of Assistance 1:  Maximum assistance, +2  Trials/Comments 1: max A x 2 for sit > stand for fwd weight shift and elevation; pt maintained B hands on RW despite VCs for safe hand placement; pt maintained standing with max A x 1 for >60 sec as dependent joey-care performed; pt req max A x 2 for controlled ecc lowering onto EOB    Stairs  Stairs: No         Outcome Measures:  [facility] Basic Mobility  Turning from your back to your side while in a flat bed without using bedrails: A little  Moving from lying on your back to sitting on the side of a flat bed without using bedrails: A little  Moving to and from bed to chair (including a wheelchair): Total  Standing up from a chair using your arms (e.g. wheelchair or bedside chair): A lot  To walk in hospital room: Total  Climbing 3-5 steps with railing: Total  Basic Mobility - Total Score: 11    Education Documentation  Mobility Training, taught by Carter Baker, PT at 10/31/2024 10:56 AM.  Learner: Patient  Readiness: Acceptance  Method: Explanation, Demonstration  Response: Needs Reinforcement    Education Comments  No comments found.        OP EDUCATION:  Outpatient Education  Individual(s) Educated: Patient  Education Comment: educated on safe mobility techniques    Encounter Problems       Encounter Problems (Active)       PT Goals       Patient will transfer supine to sit and sit to supine with minimal assist to facilitate mobility.  (Progressing)       Start:  10/25/24    Expected End:  11/08/24            Patient will transfer sit to stand and stand to sit with  moderate assist to facilitate mobility.  (Progressing)       Start:  10/25/24    Expected End:  11/08/24            Patient will transfer bed to chair and chair to bed with moderate assist to facilitate mobility.  (Progressing)       Start:  10/25/24    Expected End:  11/08/24            Patient will amb 15 feet with rolling walker device including no turns on even surface with moderate assist x 2 with wheelchair follow  to  facilitate safe mobility.  (Not Progressing)       Start:  10/25/24    Expected End:  11/08/24            Patient will increase B LE strength to 3+/5 to improve functional mobility.    (Progressing)       Start:  10/25/24    Expected End:  11/08/24                   Pain - Adult

## 2024-10-31 NOTE — CARE PLAN
The patient's goals for the shift include Maintain safety    The clinical goals for the shift include safety    Over the shift, the patient did not make progress toward the following goals. Barriers to progression include . Recommendations to address these barriers include   Problem: Pain - Adult  Goal: Verbalizes/displays adequate comfort level or baseline comfort level  Outcome: Progressing     Problem: Safety - Adult  Goal: Free from fall injury  Outcome: Progressing     Problem: Skin  Goal: Decreased wound size/increased tissue granulation at next dressing change  Outcome: Progressing  Goal: Participates in plan/prevention/treatment measures  Outcome: Progressing  Goal: Prevent/manage excess moisture  Outcome: Progressing  Goal: Prevent/minimize sheer/friction injuries  Outcome: Progressing  Goal: Promote/optimize nutrition  Outcome: Progressing  Goal: Promote skin healing  Outcome: Progressing   .

## 2024-10-31 NOTE — PROGRESS NOTES
10/31/24 0925   Discharge Planning   Expected Discharge Disposition SNF  ([facility])     Awaiting response from expedited appeal which was submitted yesterday they have 72hrs to make decision     ** do not discharge without speaking to care coordination**

## 2024-11-01 LAB
GLUCOSE BLD MANUAL STRIP-MCNC: 106 MG/DL (ref 74–99)
GLUCOSE BLD MANUAL STRIP-MCNC: 117 MG/DL (ref 74–99)
GLUCOSE BLD MANUAL STRIP-MCNC: 147 MG/DL (ref 74–99)
GLUCOSE BLD MANUAL STRIP-MCNC: 170 MG/DL (ref 74–99)

## 2024-11-01 PROCEDURE — 2500000001 HC RX 250 WO HCPCS SELF ADMINISTERED DRUGS (ALT 637 FOR MEDICARE OP): Performed by: INTERNAL MEDICINE

## 2024-11-01 PROCEDURE — 2500000004 HC RX 250 GENERAL PHARMACY W/ HCPCS (ALT 636 FOR OP/ED): Performed by: INTERNAL MEDICINE

## 2024-11-01 PROCEDURE — 97535 SELF CARE MNGMENT TRAINING: CPT | Mod: GO,CO

## 2024-11-01 PROCEDURE — 2500000005 HC RX 250 GENERAL PHARMACY W/O HCPCS: Performed by: INTERNAL MEDICINE

## 2024-11-01 PROCEDURE — 82947 ASSAY GLUCOSE BLOOD QUANT: CPT

## 2024-11-01 PROCEDURE — 1200000002 HC GENERAL ROOM WITH TELEMETRY DAILY

## 2024-11-01 PROCEDURE — 97530 THERAPEUTIC ACTIVITIES: CPT | Mod: GO,CO

## 2024-11-01 PROCEDURE — 99231 SBSQ HOSP IP/OBS SF/LOW 25: CPT | Performed by: INTERNAL MEDICINE

## 2024-11-01 RX ORDER — ACETAMINOPHEN 325 MG/1
650 TABLET ORAL EVERY 4 HOURS PRN
Qty: 30 TABLET | Refills: 0 | Status: SHIPPED | OUTPATIENT
Start: 2024-11-01

## 2024-11-01 RX ORDER — NYSTATIN 100000 [USP'U]/G
POWDER TOPICAL EVERY 8 HOURS
Start: 2024-11-01

## 2024-11-01 RX ORDER — METFORMIN HYDROCHLORIDE 1000 MG/1
1000 TABLET, FILM COATED, EXTENDED RELEASE ORAL
Start: 2024-11-01

## 2024-11-01 RX ORDER — INSULIN LISPRO 100 [IU]/ML
0-5 INJECTION, SOLUTION INTRAVENOUS; SUBCUTANEOUS
Start: 2024-11-01

## 2024-11-01 RX ORDER — METOPROLOL SUCCINATE 50 MG/1
50 TABLET, EXTENDED RELEASE ORAL DAILY
Start: 2024-11-02

## 2024-11-01 RX ORDER — PANTOPRAZOLE SODIUM 40 MG/1
40 TABLET, DELAYED RELEASE ORAL DAILY
Start: 2024-11-02

## 2024-11-01 RX ORDER — LOSARTAN POTASSIUM 100 MG/1
100 TABLET ORAL DAILY
Start: 2024-11-02

## 2024-11-01 RX ORDER — POLYETHYLENE GLYCOL 3350 17 G/17G
17 POWDER, FOR SOLUTION ORAL DAILY
Start: 2024-11-02

## 2024-11-01 ASSESSMENT — COGNITIVE AND FUNCTIONAL STATUS - GENERAL
TOILETING: TOTAL
DAILY ACTIVITIY SCORE: 13
PERSONAL GROOMING: A LITTLE
TOILETING: TOTAL
DAILY ACTIVITIY SCORE: 13
WALKING IN HOSPITAL ROOM: TOTAL
DRESSING REGULAR UPPER BODY CLOTHING: A LITTLE
MOVING FROM LYING ON BACK TO SITTING ON SIDE OF FLAT BED WITH BEDRAILS: A LITTLE
MOVING FROM LYING ON BACK TO SITTING ON SIDE OF FLAT BED WITH BEDRAILS: A LOT
PERSONAL GROOMING: A LITTLE
DAILY ACTIVITIY SCORE: 14
EATING MEALS: A LITTLE
STANDING UP FROM CHAIR USING ARMS: A LOT
CLIMB 3 TO 5 STEPS WITH RAILING: TOTAL
MOVING TO AND FROM BED TO CHAIR: A LOT
HELP NEEDED FOR BATHING: A LITTLE
HELP NEEDED FOR BATHING: A LOT
MOBILITY SCORE: 11
PERSONAL GROOMING: A LITTLE
EATING MEALS: A LITTLE
DRESSING REGULAR UPPER BODY CLOTHING: A LOT
HELP NEEDED FOR BATHING: A LOT
MOVING TO AND FROM BED TO CHAIR: A LOT
DRESSING REGULAR LOWER BODY CLOTHING: A LOT
EATING MEALS: A LITTLE
STANDING UP FROM CHAIR USING ARMS: A LOT
DRESSING REGULAR LOWER BODY CLOTHING: TOTAL
DRESSING REGULAR LOWER BODY CLOTHING: A LOT
TURNING FROM BACK TO SIDE WHILE IN FLAT BAD: A LOT
TOILETING: TOTAL
CLIMB 3 TO 5 STEPS WITH RAILING: TOTAL
DRESSING REGULAR UPPER BODY CLOTHING: A LOT
WALKING IN HOSPITAL ROOM: TOTAL

## 2024-11-01 ASSESSMENT — PAIN SCALES - GENERAL
PAINLEVEL_OUTOF10: 0 - NO PAIN

## 2024-11-01 ASSESSMENT — PAIN - FUNCTIONAL ASSESSMENT: PAIN_FUNCTIONAL_ASSESSMENT: 0-10

## 2024-11-01 ASSESSMENT — ACTIVITIES OF DAILY LIVING (ADL)
BATHING_WHERE_ASSESSED: EDGE OF BED;OTHER (COMMENT)
BATHING_LEVEL_OF_ASSISTANCE: MINIMUM ASSISTANCE
HOME_MANAGEMENT_TIME_ENTRY: 18

## 2024-11-01 NOTE — NURSING NOTE
Assumed care at 1900. Patient is in bed with the tv in front of her. Patient received all bedtime medication and tolerated well. No complaint of pain at this moment . Will continue to follow plan of care.

## 2024-11-01 NOTE — PROGRESS NOTES
Occupational Therapy    OT Treatment    Patient Name: Marianela Green  MRN: [id number]  Department: [address]  Room: [facility]  Today's Date: 11/1/2024  Time Calculation  Start Time: 0834  Stop Time: 0903  Time Calculation (min): 29 min        Assessment:  OT Assessment: Tolerated session fairly, demonstrating minor progression towards POC. Pt has poor insight to deficits with concern for care at home. Pt would benefit from continued skilled OT services to improve strength, balance, and functional tolerance to increase independence with ADL tasks  End of Session Communication: Bedside nurse  End of Session Patient Position: Bed, 3 rail up, Alarm on  OT Assessment Results: Decreased ADL status, Decreased upper extremity strength, Decreased safe judgment during ADL, Decreased cognition, Decreased endurance, Decreased functional mobility, Decreased gross motor control, Decreased trunk control for functional activities  Plan:  Treatment Interventions: ADL retraining, Functional transfer training, UE strengthening/ROM, Endurance training, Patient/family training, Equipment evaluation/education, Neuromuscular reeducation, Compensatory technique education  OT Frequency: 4 times per week  OT Discharge Recommendations: Moderate intensity level of continued care  OT Recommended Transfer Status: Assist of 2  OT - OK to Discharge: Yes  Treatment Interventions: ADL retraining, Functional transfer training, UE strengthening/ROM, Endurance training, Patient/family training, Equipment evaluation/education, Neuromuscular reeducation, Compensatory technique education    Subjective   Previous Visit Info:  OT Last Visit  OT Received On: 11/01/24  General:  General  Prior to Session Communication: Bedside nurse  Patient Position Received: Bed, 3 rail up, Alarm off, not on at start of session  General Comment: Cleared for therapy per RN. Pt supine in bed upon arrival and agreeable to tx  Precautions:  Hearing/Visual Limitations:  glasses  Medical Precautions: Fall precautions    Pain:  Pain Assessment  Pain Assessment: 0-10  0-10 (Numeric) Pain Score: 0 - No pain    Objective    Cognition:  Cognition  Overall Cognitive Status: Impaired  Safety Judgment: Decreased awareness of need for safety precautions  Cognition Comments: poor insight to deficits, pt expressing her friend is supposed to take care of her at home, however pt revealing friend never comes and pt sits in urine/feces for days and doesnt walk or clean self up  Insight: Moderate     Activities of Daily Living:    Grooming  Grooming Level of Assistance: Setup, Close supervision  Grooming Where Assessed: Edge of bed  Grooming Comments: face washing and oral hygiene tasks    UE Bathing  UE Bathing Level of Assistance: Setup, Close supervision  UE Bathing Where Assessed: Edge of bed  UE Bathing Comments: UB sponge bath with assist for back washing, cues for task efficiency    LE Bathing  LE Bathing Level of Assistance: Minimum assistance  LE Bathing Where Assessed: Edge of bed, Other (Comment)  LE Bathing Comments: LB sponge bath with cues for task efficiency, assist for task efficiency    UE Dressing  UE Dressing Level of Assistance: Minimum assistance  UE Dressing Where Assessed: Edge of bed  UE Dressing Comments: assist to don/doff gown    LE Dressing  Sock Level of Assistance: Dependent  LE Dressing Where Assessed: Edge of bed  LE Dressing Comments: assist to don socks    Toileting  Toileting Level of Assistance: Dependent  Where Assessed: Bed level  Toileting Comments: pt saturated in urine upon arrival despite purewick in place, dependent assist for pericare hygiene with pt demonstrating bowel incontinence upon stand, increased time required for task completion    Bed Mobility/Transfers: Bed Mobility  Bed Mobility: Yes  Bed Mobility 1  Bed Mobility 1: Supine to sitting  Level of Assistance 1: Moderate assistance  Bed Mobility Comments 1: increased time + effort to initiate,  assist for trunk elevation and to scoot hips to EOB with use of drawsheet  Bed Mobility 2  Bed Mobility  2: Sitting to supine  Level of Assistance 2: Maximum assistance, Moderate verbal cues  Bed Mobility Comments 2: assist to lift BLE on bed and for optimal trunk positioning  Bed Mobility 3  Bed Mobility 3: Rolling right, Rolling left  Level of Assistance 3: Minimum assistance, Minimal verbal cues  Bed Mobility Comments 3: assist to roll L/R for pericare hygiene + linen adjustment with cues for use of bed rail for UE support, dependent to boost to HOB with use of drawsheet    Transfers  Transfer: Yes  Transfer 1  Technique 1: Sit to stand, Stand to sit  Transfer Device 1: Walker  Transfer Level of Assistance 1: Maximum assistance, Moderate verbal cues  Trials/Comments 1: sit>stand x2 with assist for trunk elevation and forward weightshifting with cues for proper hand placement with pt unable to demonstrate errect posture, tolerating each stand ~5 sec/each for linen change + pericare    Sitting Balance:  Dynamic Sitting Balance  Dynamic Sitting-Level of Assistance: Close supervision  Dynamic Sitting-Balance: Forward lean, Reaching for objects, Reaching for weighted objects, Reaching across midline  Dynamic Sitting-Comments: fair+ seated balance during sponge bath  Standing Balance:  Static Standing Balance  Static Standing-Level of Assistance: Maximum assistance  Static Standing-Comment/Number of Minutes: poor standing balance    Therapy/Activity:    Therapeutic Activity  Therapeutic Activity Performed: Yes  Therapeutic Activity 1: tolerated sitting EOB ~20 min during ADL tasks  Outcome Measures:[facility] Daily Activity  Putting on and taking off regular lower body clothing: Total  Bathing (including washing, rinsing, drying): A little  Putting on and taking off regular upper body clothing: A little  Toileting, which includes using toilet, bedpan or urinal: Total  Taking care of personal grooming such as brushing teeth:  A little  Eating Meals: A little  Daily Activity - Total Score: 14    Education Documentation  Body Mechanics, taught by JANAY Shaw at 11/1/2024 10:41 AM.  Learner: Patient  Readiness: Acceptance  Method: Explanation  Response: Needs Reinforcement, No Evidence of Learning    Home Exercise Program, taught by JANAY Shaw at 11/1/2024 10:41 AM.  Learner: Patient  Readiness: Acceptance  Method: Explanation  Response: Needs Reinforcement, No Evidence of Learning    ADL Training, taught by JANAY Shaw at 11/1/2024 10:41 AM.  Learner: Patient  Readiness: Acceptance  Method: Explanation  Response: Needs Reinforcement, No Evidence of Learning    Education Comments  No comments found.      Problem: OT Goals  Goal: Pt will complete self-feeding with mod indep/indep.  Outcome: Progressing  Goal: Pt will complete all grooming tasks with setup while seated.  Outcome: Progressing  Goal: Pt will complete UB bathing with min A to setup.  Outcome: Progressing     Problem: OT Goals  Goal: Pt will complete all functional transfers from bed to wheelchair/chair/bedside commode with mod A using a device as recommended by skilled PT.  Outcome: Not Progressing

## 2024-11-01 NOTE — CARE PLAN
The patient's goals for the shift include Maintain safety    The clinical goals for the shift include safety      Problem: Pain - Adult  Goal: Verbalizes/displays adequate comfort level or baseline comfort level  Outcome: Progressing     Problem: Safety - Adult  Goal: Free from fall injury  Outcome: Progressing     Problem: Discharge Planning  Goal: Discharge to home or other facility with appropriate resources  Outcome: Progressing     Problem: Chronic Conditions and Co-morbidities  Goal: Patient's chronic conditions and co-morbidity symptoms are monitored and maintained or improved  Outcome: Progressing     Problem: Skin  Goal: Decreased wound size/increased tissue granulation at next dressing change  Outcome: Progressing  Goal: Participates in plan/prevention/treatment measures  Outcome: Progressing  Goal: Prevent/manage excess moisture  Outcome: Progressing  Goal: Prevent/minimize sheer/friction injuries  Outcome: Progressing  Goal: Promote/optimize nutrition  Outcome: Progressing  Goal: Promote skin healing  Outcome: Progressing     Problem: Pain  Goal: Takes deep breaths with improved pain control throughout the shift  Outcome: Progressing  Goal: Turns in bed with improved pain control throughout the shift  Outcome: Progressing  Goal: Walks with improved pain control throughout the shift  Outcome: Progressing  Goal: Performs ADL's with improved pain control throughout shift  Outcome: Progressing  Goal: Participates in PT with improved pain control throughout the shift  Outcome: Progressing  Goal: Free from opioid side effects throughout the shift  Outcome: Progressing  Goal: Free from acute confusion related to pain meds throughout the shift  Outcome: Progressing     Problem: Fall/Injury  Goal: Not fall by end of shift  Outcome: Progressing  Goal: Be free from injury by end of the shift  Outcome: Progressing  Goal: Verbalize understanding of personal risk factors for fall in the hospital  Outcome:  Progressing  Goal: Verbalize understanding of risk factor reduction measures to prevent injury from fall in the home  Outcome: Progressing  Goal: Use assistive devices by end of the shift  Outcome: Progressing  Goal: Pace activities to prevent fatigue by end of the shift  Outcome: Progressing     Problem: Nutrition  Goal: Oral intake greater 75%  Outcome: Progressing

## 2024-11-01 NOTE — NURSING NOTE
Patient asked when she is leaving today. RN made her aware we are waiting on care coordination for the okay to be discharged.

## 2024-11-01 NOTE — PROGRESS NOTES
11/01/24 0837   Discharge Planning   Expected Discharge Disposition SNF  ([facility])     Awaiting appeal which was submitted on 10/30, they have 72hrs to respond     UPDATE 1117: [facility] received call from [facility] that appeal has been approved awaiting faxed approval letter to send to facility, Dr benitez made aware awaiting dc orders     UPDATE 1125: Daughter loreta updated at this time     ** do not discharge without speaking to care coordination**

## 2024-11-01 NOTE — CARE PLAN
The patient's goals for the shift include Maintain safety    The clinical goals for the shift include maintain safety      Problem: Pain - Adult  Goal: Verbalizes/displays adequate comfort level or baseline comfort level  Outcome: Progressing     Problem: Safety - Adult  Goal: Free from fall injury  Outcome: Progressing     Problem: Discharge Planning  Goal: Discharge to home or other facility with appropriate resources  Outcome: Progressing     Problem: Chronic Conditions and Co-morbidities  Goal: Patient's chronic conditions and co-morbidity symptoms are monitored and maintained or improved  Outcome: Progressing     Problem: Skin  Goal: Decreased wound size/increased tissue granulation at next dressing change  Outcome: Progressing  Flowsheets (Taken 11/1/2024 1023)  Decreased wound size/increased tissue granulation at next dressing change: Promote sleep for wound healing  Goal: Participates in plan/prevention/treatment measures  Outcome: Progressing  Flowsheets (Taken 11/1/2024 1023)  Participates in plan/prevention/treatment measures: Discuss with provider PT/OT consult  Goal: Prevent/manage excess moisture  Outcome: Progressing  Flowsheets (Taken 11/1/2024 1023)  Prevent/manage excess moisture: Monitor for/manage infection if present  Goal: Prevent/minimize sheer/friction injuries  Outcome: Progressing  Flowsheets (Taken 11/1/2024 1023)  Prevent/minimize sheer/friction injuries: Use pull sheet  Goal: Promote/optimize nutrition  Outcome: Progressing  Flowsheets (Taken 11/1/2024 1023)  Promote/optimize nutrition: Monitor/record intake including meals  Goal: Promote skin healing  Outcome: Progressing  Flowsheets (Taken 11/1/2024 1023)  Promote skin healing: Turn/reposition every 2 hours/use positioning/transfer devices     Problem: Pain  Goal: Takes deep breaths with improved pain control throughout the shift  Outcome: Progressing  Goal: Turns in bed with improved pain control throughout the shift  Outcome:  Progressing  Goal: Walks with improved pain control throughout the shift  Outcome: Progressing  Goal: Performs ADL's with improved pain control throughout shift  Outcome: Progressing  Goal: Participates in PT with improved pain control throughout the shift  Outcome: Progressing  Goal: Free from opioid side effects throughout the shift  Outcome: Progressing  Goal: Free from acute confusion related to pain meds throughout the shift  Outcome: Progressing     Problem: Fall/Injury  Goal: Not fall by end of shift  Outcome: Progressing  Goal: Be free from injury by end of the shift  Outcome: Progressing  Goal: Verbalize understanding of personal risk factors for fall in the hospital  Outcome: Progressing  Goal: Verbalize understanding of risk factor reduction measures to prevent injury from fall in the home  Outcome: Progressing  Goal: Use assistive devices by end of the shift  Outcome: Progressing  Goal: Pace activities to prevent fatigue by end of the shift  Outcome: Progressing     Problem: Nutrition  Goal: Oral intake greater 75%  Outcome: Progressing

## 2024-11-02 LAB
GLUCOSE BLD MANUAL STRIP-MCNC: 117 MG/DL (ref 74–99)
GLUCOSE BLD MANUAL STRIP-MCNC: 134 MG/DL (ref 74–99)
GLUCOSE BLD MANUAL STRIP-MCNC: 136 MG/DL (ref 74–99)

## 2024-11-02 PROCEDURE — 99231 SBSQ HOSP IP/OBS SF/LOW 25: CPT | Performed by: INTERNAL MEDICINE

## 2024-11-02 PROCEDURE — 2500000004 HC RX 250 GENERAL PHARMACY W/ HCPCS (ALT 636 FOR OP/ED): Performed by: INTERNAL MEDICINE

## 2024-11-02 PROCEDURE — 2500000005 HC RX 250 GENERAL PHARMACY W/O HCPCS: Performed by: INTERNAL MEDICINE

## 2024-11-02 PROCEDURE — 82947 ASSAY GLUCOSE BLOOD QUANT: CPT

## 2024-11-02 PROCEDURE — 1200000002 HC GENERAL ROOM WITH TELEMETRY DAILY

## 2024-11-02 PROCEDURE — 2500000001 HC RX 250 WO HCPCS SELF ADMINISTERED DRUGS (ALT 637 FOR MEDICARE OP): Performed by: INTERNAL MEDICINE

## 2024-11-02 ASSESSMENT — COGNITIVE AND FUNCTIONAL STATUS - GENERAL
MOBILITY SCORE: 12
WALKING IN HOSPITAL ROOM: A LOT
CLIMB 3 TO 5 STEPS WITH RAILING: A LOT
DRESSING REGULAR LOWER BODY CLOTHING: A LOT
PERSONAL GROOMING: A LOT
STANDING UP FROM CHAIR USING ARMS: A LOT
WALKING IN HOSPITAL ROOM: A LOT
DAILY ACTIVITIY SCORE: 12
MOVING FROM LYING ON BACK TO SITTING ON SIDE OF FLAT BED WITH BEDRAILS: A LOT
PERSONAL GROOMING: A LITTLE
TURNING FROM BACK TO SIDE WHILE IN FLAT BAD: A LOT
DRESSING REGULAR LOWER BODY CLOTHING: A LOT
STANDING UP FROM CHAIR USING ARMS: A LOT
MOVING TO AND FROM BED TO CHAIR: A LOT
MOVING TO AND FROM BED TO CHAIR: A LOT
TOILETING: A LOT
TOILETING: A LOT
MOVING FROM LYING ON BACK TO SITTING ON SIDE OF FLAT BED WITH BEDRAILS: A LITTLE
DRESSING REGULAR UPPER BODY CLOTHING: A LOT
EATING MEALS: A LOT
TURNING FROM BACK TO SIDE WHILE IN FLAT BAD: A LOT
MOBILITY SCORE: 12
DRESSING REGULAR UPPER BODY CLOTHING: A LOT
HELP NEEDED FOR BATHING: A LOT
CLIMB 3 TO 5 STEPS WITH RAILING: TOTAL
HELP NEEDED FOR BATHING: A LOT
DAILY ACTIVITIY SCORE: 15

## 2024-11-02 ASSESSMENT — PAIN DESCRIPTION - ORIENTATION: ORIENTATION: RIGHT

## 2024-11-02 ASSESSMENT — PAIN SCALES - GENERAL
PAINLEVEL_OUTOF10: 1
PAINLEVEL_OUTOF10: 3
PAINLEVEL_OUTOF10: 0 - NO PAIN

## 2024-11-02 ASSESSMENT — PAIN DESCRIPTION - LOCATION: LOCATION: KNEE

## 2024-11-02 ASSESSMENT — PAIN - FUNCTIONAL ASSESSMENT: PAIN_FUNCTIONAL_ASSESSMENT: 0-10

## 2024-11-02 NOTE — NURSING NOTE
Assumed care of patient, Patient asleep in bed, call light within reach. Awaiting message from Care Coordination for approval for rehab, Discharge in.

## 2024-11-02 NOTE — PROGRESS NOTES
"Switch to oral antibiotic at discharge Marianela Green is a 74 y.o. female on day 8 of admission presenting with Frequent falls.    Subjective   Patient is feeling better.  She is eating little better today.  Still remains weak and not able to ambulate on her own.  Blood pressure and blood sugar improving     Objective     Physical Exam  Vitals reviewed.   Constitutional:       Appearance: Normal appearance.   HENT:      Head: Normocephalic and atraumatic.      Right Ear: Tympanic membrane, ear canal and external ear normal.      Left Ear: Tympanic membrane, ear canal and external ear normal.      Nose: Nose normal.      Mouth/Throat:      Pharynx: Oropharynx is clear.   Eyes:      Extraocular Movements: Extraocular movements intact.      Conjunctiva/sclera: Conjunctivae normal.      Pupils: Pupils are equal, round, and reactive to light.   Cardiovascular:      Rate and Rhythm: Normal rate and regular rhythm.      Pulses: Normal pulses.      Heart sounds: Normal heart sounds.   Pulmonary:      Effort: Pulmonary effort is normal.      Breath sounds: Normal breath sounds.   Abdominal:      General: Abdomen is flat. Bowel sounds are normal.      Palpations: Abdomen is soft.   Musculoskeletal:      Cervical back: Normal range of motion and neck supple.   Skin:     General: Skin is warm and dry.      Comments:   Fungal rash in skin fold   Neurological:      General: No focal deficit present.      Mental Status: She is alert and oriented to person, place, and time.   Psychiatric:         Mood and Affect: Mood normal.         Last Recorded Vitals  Blood pressure 161/65, pulse 65, temperature 36.4 °C (97.5 °F), temperature source Oral, resp. rate 18, height 1.6 m (5' 3\"), weight 97.3 kg (214 lb 8.1 oz), SpO2 96%.  Intake/Output last 3 Shifts:  I/O last 3 completed shifts:  In: 480 (4.9 mL/kg) [P.O.:480]  Out: 500 (5.1 mL/kg) [Urine:500 (0.1 mL/kg/hr)]  Weight: 97.3 kg     Relevant Results               Scheduled " medications  cefTRIAXone, 1 g, intravenous, q24h  docusate sodium, 100 mg, oral, BID  enoxaparin, 40 mg, subcutaneous, Daily  insulin lispro, 0-5 Units, subcutaneous, TID  losartan, 100 mg, oral, Daily  metFORMIN (MOD), 1,000 mg, oral, BID  metoprolol succinate XL, 50 mg, oral, Daily  nystatin, , Topical, q8h  pantoprazole, 40 mg, oral, Daily   Or  pantoprazole, 40 mg, intravenous, Daily  polyethylene glycol, 17 g, oral, Daily      Continuous medications     PRN medications  PRN medications: acetaminophen **OR** acetaminophen **OR** acetaminophen, benzocaine-menthol, dextromethorphan-guaifenesin, guaiFENesin, melatonin, ondansetron **OR** ondansetron  Results for orders placed or performed during the hospital encounter of 10/24/24 (from the past 24 hours)   POCT GLUCOSE   Result Value Ref Range    POCT Glucose 117 (H) 74 - 99 mg/dL   POCT GLUCOSE   Result Value Ref Range    POCT Glucose 106 (H) 74 - 99 mg/dL   POCT GLUCOSE   Result Value Ref Range    POCT Glucose 147 (H) 74 - 99 mg/dL   POCT GLUCOSE   Result Value Ref Range    POCT Glucose 170 (H) 74 - 99 mg/dL     No results found.                 Assessment/Plan   Assessment & Plan  Frequent falls    Bilateral lower leg cellulitis    Candidiasis of skin    Elevated blood sugar level    Hypertension    Failure to thrive (child)    COPD (chronic obstructive pulmonary disease) (Multi)    Abnormal chest x-ray    UTI (urinary tract infection)    Leg cellulitis is  resolved  Urine cultures growing E. coli  Switch to oral antibiotic at discharge  continue nystatin cream for the fungal rash improving  Blood pressure is doing better  COPD stable  A1c 8.6    Continue sliding scale insulin for elevated blood sugar  Increase metformin twice a day  CT lung shows nodules  Nodules which needs follow-up  Physical therapy recommended rehab  Plan for rehab when arrangements made.   involved and appeal is made       I spent 2 minutes in the professional and overall  care of this patient.      Shelbie Ling MD

## 2024-11-02 NOTE — CARE PLAN
The patient's goals for the shift include Maintain safety    The clinical goals for the shift include maintain safety.

## 2024-11-02 NOTE — CARE PLAN
The patient's goals for the shift include Maintain safety    The clinical goals for the shift include maintain safety      Problem: Pain - Adult  Goal: Verbalizes/displays adequate comfort level or baseline comfort level  Outcome: Progressing     Problem: Safety - Adult  Goal: Free from fall injury  Outcome: Progressing     Problem: Discharge Planning  Goal: Discharge to home or other facility with appropriate resources  Outcome: Progressing     Problem: Chronic Conditions and Co-morbidities  Goal: Patient's chronic conditions and co-morbidity symptoms are monitored and maintained or improved  Outcome: Progressing     Problem: Skin  Goal: Decreased wound size/increased tissue granulation at next dressing change  Outcome: Progressing  Goal: Participates in plan/prevention/treatment measures  Outcome: Progressing  Goal: Prevent/manage excess moisture  Outcome: Progressing  Goal: Prevent/minimize sheer/friction injuries  Outcome: Progressing  Goal: Promote/optimize nutrition  Outcome: Progressing  Goal: Promote skin healing  Outcome: Progressing     Problem: Pain  Goal: Takes deep breaths with improved pain control throughout the shift  Outcome: Progressing  Goal: Turns in bed with improved pain control throughout the shift  Outcome: Progressing  Goal: Walks with improved pain control throughout the shift  Outcome: Progressing  Goal: Performs ADL's with improved pain control throughout shift  Outcome: Progressing  Goal: Participates in PT with improved pain control throughout the shift  Outcome: Progressing  Goal: Free from opioid side effects throughout the shift  Outcome: Progressing  Goal: Free from acute confusion related to pain meds throughout the shift  Outcome: Progressing     Problem: Fall/Injury  Goal: Not fall by end of shift  Outcome: Progressing  Goal: Be free from injury by end of the shift  Outcome: Progressing  Goal: Verbalize understanding of personal risk factors for fall in the hospital  Outcome:  Progressing  Goal: Verbalize understanding of risk factor reduction measures to prevent injury from fall in the home  Outcome: Progressing  Goal: Use assistive devices by end of the shift  Outcome: Progressing  Goal: Pace activities to prevent fatigue by end of the shift  Outcome: Progressing     Problem: Nutrition  Goal: Oral intake greater 75%  Outcome: Progressing

## 2024-11-02 NOTE — NURSING NOTE
Assumed care of patient.  Pt alert and visiting with family at bedside.  Call light within reach and bed alarm activated.  No needs at this time.

## 2024-11-03 LAB
GLUCOSE BLD MANUAL STRIP-MCNC: 108 MG/DL (ref 74–99)
GLUCOSE BLD MANUAL STRIP-MCNC: 109 MG/DL (ref 74–99)
GLUCOSE BLD MANUAL STRIP-MCNC: 117 MG/DL (ref 74–99)
GLUCOSE BLD MANUAL STRIP-MCNC: 139 MG/DL (ref 74–99)

## 2024-11-03 PROCEDURE — 2500000004 HC RX 250 GENERAL PHARMACY W/ HCPCS (ALT 636 FOR OP/ED): Performed by: INTERNAL MEDICINE

## 2024-11-03 PROCEDURE — 99231 SBSQ HOSP IP/OBS SF/LOW 25: CPT | Performed by: INTERNAL MEDICINE

## 2024-11-03 PROCEDURE — 1200000002 HC GENERAL ROOM WITH TELEMETRY DAILY

## 2024-11-03 PROCEDURE — 82947 ASSAY GLUCOSE BLOOD QUANT: CPT

## 2024-11-03 PROCEDURE — 2500000001 HC RX 250 WO HCPCS SELF ADMINISTERED DRUGS (ALT 637 FOR MEDICARE OP): Performed by: INTERNAL MEDICINE

## 2024-11-03 PROCEDURE — 2500000005 HC RX 250 GENERAL PHARMACY W/O HCPCS: Performed by: INTERNAL MEDICINE

## 2024-11-03 ASSESSMENT — COGNITIVE AND FUNCTIONAL STATUS - GENERAL
HELP NEEDED FOR BATHING: A LOT
MOBILITY SCORE: 12
MOVING FROM LYING ON BACK TO SITTING ON SIDE OF FLAT BED WITH BEDRAILS: A LOT
MOBILITY SCORE: 12
MOVING FROM LYING ON BACK TO SITTING ON SIDE OF FLAT BED WITH BEDRAILS: A LOT
PERSONAL GROOMING: A LOT
STANDING UP FROM CHAIR USING ARMS: A LOT
TURNING FROM BACK TO SIDE WHILE IN FLAT BAD: A LOT
MOVING TO AND FROM BED TO CHAIR: A LOT
TOILETING: A LOT
DRESSING REGULAR LOWER BODY CLOTHING: A LOT
HELP NEEDED FOR BATHING: A LOT
MOVING TO AND FROM BED TO CHAIR: A LOT
DRESSING REGULAR UPPER BODY CLOTHING: A LOT
DRESSING REGULAR LOWER BODY CLOTHING: A LOT
TOILETING: A LOT
EATING MEALS: A LOT
WALKING IN HOSPITAL ROOM: A LOT
TURNING FROM BACK TO SIDE WHILE IN FLAT BAD: A LOT
PERSONAL GROOMING: A LOT
STANDING UP FROM CHAIR USING ARMS: A LOT
EATING MEALS: A LOT
DAILY ACTIVITIY SCORE: 12
DAILY ACTIVITIY SCORE: 12
CLIMB 3 TO 5 STEPS WITH RAILING: A LOT
DRESSING REGULAR UPPER BODY CLOTHING: A LOT
CLIMB 3 TO 5 STEPS WITH RAILING: A LOT
WALKING IN HOSPITAL ROOM: A LOT

## 2024-11-03 ASSESSMENT — PAIN DESCRIPTION - ORIENTATION
ORIENTATION: RIGHT
ORIENTATION: RIGHT

## 2024-11-03 ASSESSMENT — PAIN SCALES - GENERAL
PAINLEVEL_OUTOF10: 0 - NO PAIN
PAINLEVEL_OUTOF10: 0 - NO PAIN
PAINLEVEL_OUTOF10: 3
PAINLEVEL_OUTOF10: 3

## 2024-11-03 ASSESSMENT — PAIN DESCRIPTION - LOCATION
LOCATION: KNEE
LOCATION: KNEE

## 2024-11-03 NOTE — PROGRESS NOTES
"Switch to oral antibiotic at discharge Marianela Green is a 74 y.o. female on day 9 of admission presenting with Frequent falls.    Subjective   Patient is feeling better.  She is eating little better today.  Still remains weak and not able to ambulate on her own.  Blood pressure and blood sugar improving     Objective     Physical Exam  Vitals reviewed.   Constitutional:       Appearance: Normal appearance.   HENT:      Head: Normocephalic and atraumatic.      Right Ear: Tympanic membrane, ear canal and external ear normal.      Left Ear: Tympanic membrane, ear canal and external ear normal.      Nose: Nose normal.      Mouth/Throat:      Pharynx: Oropharynx is clear.   Eyes:      Extraocular Movements: Extraocular movements intact.      Conjunctiva/sclera: Conjunctivae normal.      Pupils: Pupils are equal, round, and reactive to light.   Cardiovascular:      Rate and Rhythm: Normal rate and regular rhythm.      Pulses: Normal pulses.      Heart sounds: Normal heart sounds.   Pulmonary:      Effort: Pulmonary effort is normal.      Breath sounds: Normal breath sounds.   Abdominal:      General: Abdomen is flat. Bowel sounds are normal.      Palpations: Abdomen is soft.   Musculoskeletal:      Cervical back: Normal range of motion and neck supple.   Skin:     General: Skin is warm and dry.      Comments:   Fungal rash in skin fold   Neurological:      General: No focal deficit present.      Mental Status: She is alert and oriented to person, place, and time.   Psychiatric:         Mood and Affect: Mood normal.         Last Recorded Vitals  Blood pressure 142/60, pulse 71, temperature 36.5 °C (97.7 °F), temperature source Oral, resp. rate 18, height 1.6 m (5' 3\"), weight 97.3 kg (214 lb 8.1 oz), SpO2 96%.  Intake/Output last 3 Shifts:  I/O last 3 completed shifts:  In: 100 (1 mL/kg) [IV Piggyback:100]  Out: 1050 (10.8 mL/kg) [Urine:1050 (0.3 mL/kg/hr)]  Weight: 97.3 kg     Relevant Results               Scheduled " medications  cefTRIAXone, 1 g, intravenous, q24h  docusate sodium, 100 mg, oral, BID  enoxaparin, 40 mg, subcutaneous, Daily  insulin lispro, 0-5 Units, subcutaneous, TID  losartan, 100 mg, oral, Daily  metFORMIN (MOD), 1,000 mg, oral, BID  metoprolol succinate XL, 50 mg, oral, Daily  nystatin, , Topical, q8h  pantoprazole, 40 mg, oral, Daily   Or  pantoprazole, 40 mg, intravenous, Daily  polyethylene glycol, 17 g, oral, Daily      Continuous medications     PRN medications  PRN medications: acetaminophen **OR** acetaminophen **OR** acetaminophen, benzocaine-menthol, dextromethorphan-guaifenesin, guaiFENesin, melatonin, ondansetron **OR** ondansetron  Results for orders placed or performed during the hospital encounter of 10/24/24 (from the past 24 hours)   POCT GLUCOSE   Result Value Ref Range    POCT Glucose 117 (H) 74 - 99 mg/dL   POCT GLUCOSE   Result Value Ref Range    POCT Glucose 134 (H) 74 - 99 mg/dL   POCT GLUCOSE   Result Value Ref Range    POCT Glucose 136 (H) 74 - 99 mg/dL     No results found.                 Assessment/Plan   Assessment & Plan  Frequent falls    Bilateral lower leg cellulitis    Candidiasis of skin    Elevated blood sugar level    Hypertension    Failure to thrive (child)    COPD (chronic obstructive pulmonary disease) (Multi)    Abnormal chest x-ray    UTI (urinary tract infection)    Leg cellulitis is  resolved  Urine cultures growing E. coli  Switch to oral antibiotic at discharge  continue nystatin cream for the fungal rash improving  Blood pressure is doing better  COPD stable  A1c 8.6    Continue sliding scale insulin for elevated blood sugar  Increase metformin twice a day  CT lung shows nodules  Nodules which needs follow-up  Physical therapy recommended rehab  Plan for rehab when arrangements made.    Apparently patient's insurance is approved rehab but arrangements has not been made by  for discharge       I spent 2 minutes in the professional and overall care  of this patient.      Shelbie Ling MD

## 2024-11-03 NOTE — CARE PLAN
The patient's goals for the shift include Maintain safety    The clinical goals for the shift include pt will remain safe and free from injury this shift

## 2024-11-03 NOTE — CARE PLAN
The patient's goals for the shift include Maintain safety    The clinical goals for the shift include pt will remain safe and free from injury this shift      Problem: Pain - Adult  Goal: Verbalizes/displays adequate comfort level or baseline comfort level  Outcome: Progressing     Problem: Safety - Adult  Goal: Free from fall injury  Outcome: Progressing     Problem: Discharge Planning  Goal: Discharge to home or other facility with appropriate resources  Outcome: Progressing     Problem: Chronic Conditions and Co-morbidities  Goal: Patient's chronic conditions and co-morbidity symptoms are monitored and maintained or improved  Outcome: Progressing     Problem: Skin  Goal: Decreased wound size/increased tissue granulation at next dressing change  Outcome: Progressing  Flowsheets (Taken 11/3/2024 0101)  Decreased wound size/increased tissue granulation at next dressing change: Protective dressings over bony prominences  Goal: Participates in plan/prevention/treatment measures  Outcome: Progressing  Flowsheets (Taken 11/3/2024 0101)  Participates in plan/prevention/treatment measures: Elevate heels  Goal: Prevent/manage excess moisture  Outcome: Progressing  Flowsheets (Taken 11/3/2024 0101)  Prevent/manage excess moisture:   Moisturize dry skin   Cleanse incontinence/protect with barrier cream  Goal: Prevent/minimize sheer/friction injuries  Outcome: Progressing  Flowsheets (Taken 11/3/2024 0101)  Prevent/minimize sheer/friction injuries:   Use pull sheet   HOB 30 degrees or less   Utilize specialty bed per algorithm  Goal: Promote/optimize nutrition  Outcome: Progressing  Flowsheets (Taken 11/3/2024 0101)  Promote/optimize nutrition:   Monitor/record intake including meals   Consume > 50% meals/supplements   Offer water/supplements/favorite foods  Goal: Promote skin healing  Outcome: Progressing  Flowsheets (Taken 11/3/2024 0101)  Promote skin healing: Protective dressings over bony prominences     Problem:  Pain  Goal: Takes deep breaths with improved pain control throughout the shift  Outcome: Progressing  Goal: Turns in bed with improved pain control throughout the shift  Outcome: Progressing  Goal: Walks with improved pain control throughout the shift  Outcome: Progressing  Goal: Performs ADL's with improved pain control throughout shift  Outcome: Progressing  Goal: Participates in PT with improved pain control throughout the shift  Outcome: Progressing  Goal: Free from opioid side effects throughout the shift  Outcome: Progressing  Goal: Free from acute confusion related to pain meds throughout the shift  Outcome: Progressing     Problem: Fall/Injury  Goal: Not fall by end of shift  Outcome: Progressing  Goal: Be free from injury by end of the shift  Outcome: Progressing  Goal: Verbalize understanding of personal risk factors for fall in the hospital  Outcome: Progressing  Goal: Verbalize understanding of risk factor reduction measures to prevent injury from fall in the home  Outcome: Progressing  Goal: Use assistive devices by end of the shift  Outcome: Progressing  Goal: Pace activities to prevent fatigue by end of the shift  Outcome: Progressing     Problem: Nutrition  Goal: Oral intake greater 75%  Outcome: Progressing

## 2024-11-03 NOTE — PROGRESS NOTES
"   11/03/24 0820   Discharge Planning   Expected Discharge Disposition SNF   Does the patient need discharge transport arranged? Yes   RoundTrip coordination needed? Yes     Awaiting [facility] to update portal with approval from appeal on Friday morning, no updates at this time.   Information sent to direct submit team for review, no updates at this time.     UPDATE 0930:  The portal still reflects the status as \"Appeal: Pending\".       DC Plan NOT secure  Do NOT DC without speaking to care coordination, PRECERT PENDING FOR SNF   "

## 2024-11-03 NOTE — PROGRESS NOTES
"Switch to oral antibiotic at discharge Marianela Green is a 74 y.o. female on day 10 of admission presenting with Frequent falls.    Subjective   Feeling ok     Objective     Physical Exam  Vitals reviewed.   Constitutional:       Appearance: Normal appearance.   HENT:      Head: Normocephalic and atraumatic.      Right Ear: Tympanic membrane, ear canal and external ear normal.      Left Ear: Tympanic membrane, ear canal and external ear normal.      Nose: Nose normal.      Mouth/Throat:      Pharynx: Oropharynx is clear.   Eyes:      Extraocular Movements: Extraocular movements intact.      Conjunctiva/sclera: Conjunctivae normal.      Pupils: Pupils are equal, round, and reactive to light.   Cardiovascular:      Rate and Rhythm: Normal rate and regular rhythm.      Pulses: Normal pulses.      Heart sounds: Normal heart sounds.   Pulmonary:      Effort: Pulmonary effort is normal.      Breath sounds: Normal breath sounds.   Abdominal:      General: Abdomen is flat. Bowel sounds are normal.      Palpations: Abdomen is soft.   Musculoskeletal:      Cervical back: Normal range of motion and neck supple.   Skin:     General: Skin is warm and dry.      Comments:   Fungal rash in skin fold   Neurological:      General: No focal deficit present.      Mental Status: She is alert and oriented to person, place, and time.   Psychiatric:         Mood and Affect: Mood normal.         Last Recorded Vitals  Blood pressure 134/61, pulse 64, temperature 36.6 °C (97.9 °F), temperature source Oral, resp. rate 18, height 1.6 m (5' 3\"), weight 97.3 kg (214 lb 8.1 oz), SpO2 98%.  Intake/Output last 3 Shifts:  I/O last 3 completed shifts:  In: 330 (3.4 mL/kg) [P.O.:180; IV Piggyback:150]  Out: 2450 (25.2 mL/kg) [Urine:2450 (0.7 mL/kg/hr)]  Weight: 97.3 kg     Relevant Results               Scheduled medications  cefTRIAXone, 1 g, intravenous, q24h  docusate sodium, 100 mg, oral, BID  enoxaparin, 40 mg, subcutaneous, Daily  insulin " lispro, 0-5 Units, subcutaneous, TID  losartan, 100 mg, oral, Daily  metFORMIN (MOD), 1,000 mg, oral, BID  metoprolol succinate XL, 50 mg, oral, Daily  nystatin, , Topical, q8h  pantoprazole, 40 mg, oral, Daily   Or  pantoprazole, 40 mg, intravenous, Daily  polyethylene glycol, 17 g, oral, Daily      Continuous medications     PRN medications  PRN medications: acetaminophen **OR** acetaminophen **OR** acetaminophen, benzocaine-menthol, dextromethorphan-guaifenesin, guaiFENesin, melatonin, ondansetron **OR** ondansetron  Results for orders placed or performed during the hospital encounter of 10/24/24 (from the past 24 hours)   POCT GLUCOSE   Result Value Ref Range    POCT Glucose 136 (H) 74 - 99 mg/dL   POCT GLUCOSE   Result Value Ref Range    POCT Glucose 139 (H) 74 - 99 mg/dL   POCT GLUCOSE   Result Value Ref Range    POCT Glucose 117 (H) 74 - 99 mg/dL   POCT GLUCOSE   Result Value Ref Range    POCT Glucose 109 (H) 74 - 99 mg/dL     No results found.                 Assessment/Plan   Assessment & Plan  Frequent falls    Bilateral lower leg cellulitis    Candidiasis of skin    Elevated blood sugar level    Hypertension    Failure to thrive (child)    COPD (chronic obstructive pulmonary disease) (Multi)    Abnormal chest x-ray    UTI (urinary tract infection)    Leg cellulitis is  resolved  Urine cultures growing E. coli  Switch to oral antibiotic at discharge  continue nystatin cream for the fungal rash improving  Blood pressure is doing better  COPD stable  A1c 8.6    Continue sliding scale insulin for elevated blood sugar   metformin twice a day  Waiting for rehab placement     I spent 2 minutes in the professional and overall care of this patient.      Shelbie Ling MD

## 2024-11-04 VITALS
BODY MASS INDEX: 38.01 KG/M2 | TEMPERATURE: 97.5 F | HEART RATE: 66 BPM | HEIGHT: 63 IN | RESPIRATION RATE: 18 BRPM | WEIGHT: 214.51 LBS | SYSTOLIC BLOOD PRESSURE: 124 MMHG | OXYGEN SATURATION: 95 % | DIASTOLIC BLOOD PRESSURE: 87 MMHG

## 2024-11-04 VITALS
HEIGHT: 63 IN | DIASTOLIC BLOOD PRESSURE: 71 MMHG | TEMPERATURE: 98.1 F | WEIGHT: 214.51 LBS | SYSTOLIC BLOOD PRESSURE: 155 MMHG | OXYGEN SATURATION: 97 % | HEART RATE: 72 BPM | BODY MASS INDEX: 38.01 KG/M2 | RESPIRATION RATE: 17 BRPM

## 2024-11-04 LAB
GLUCOSE BLD MANUAL STRIP-MCNC: 145 MG/DL (ref 74–99)
GLUCOSE BLD MANUAL STRIP-MCNC: 92 MG/DL (ref 74–99)

## 2024-11-04 PROCEDURE — 99239 HOSP IP/OBS DSCHRG MGMT >30: CPT | Performed by: INTERNAL MEDICINE

## 2024-11-04 PROCEDURE — 2500000001 HC RX 250 WO HCPCS SELF ADMINISTERED DRUGS (ALT 637 FOR MEDICARE OP): Performed by: INTERNAL MEDICINE

## 2024-11-04 PROCEDURE — 2500000004 HC RX 250 GENERAL PHARMACY W/ HCPCS (ALT 636 FOR OP/ED): Performed by: INTERNAL MEDICINE

## 2024-11-04 PROCEDURE — 82947 ASSAY GLUCOSE BLOOD QUANT: CPT

## 2024-11-04 ASSESSMENT — PAIN - FUNCTIONAL ASSESSMENT: PAIN_FUNCTIONAL_ASSESSMENT: UNABLE TO SELF-REPORT

## 2024-11-04 ASSESSMENT — PAIN SCALES - GENERAL: PAINLEVEL_OUTOF10: 0 - NO PAIN

## 2024-11-04 NOTE — NURSING NOTE
Iv removed. Report called to Mikki at [facility]. Tele removed. Belongings with patient. Family aware.

## 2024-11-04 NOTE — CARE PLAN
The patient's goals for the shift include Maintain safety    The clinical goals for the shift include pt will get some rest/sleep      Problem: Pain - Adult  Goal: Verbalizes/displays adequate comfort level or baseline comfort level  Outcome: Progressing     Problem: Safety - Adult  Goal: Free from fall injury  Outcome: Progressing     Problem: Discharge Planning  Goal: Discharge to home or other facility with appropriate resources  Outcome: Progressing     Problem: Chronic Conditions and Co-morbidities  Goal: Patient's chronic conditions and co-morbidity symptoms are monitored and maintained or improved  Outcome: Progressing     Problem: Skin  Goal: Decreased wound size/increased tissue granulation at next dressing change  Outcome: Progressing  Goal: Participates in plan/prevention/treatment measures  Outcome: Progressing  Goal: Prevent/manage excess moisture  Outcome: Progressing  Goal: Prevent/minimize sheer/friction injuries  Outcome: Progressing  Goal: Promote/optimize nutrition  Outcome: Progressing  Goal: Promote skin healing  Outcome: Progressing     Problem: Pain  Goal: Takes deep breaths with improved pain control throughout the shift  Outcome: Progressing  Goal: Turns in bed with improved pain control throughout the shift  Outcome: Progressing  Goal: Walks with improved pain control throughout the shift  Outcome: Progressing  Goal: Performs ADL's with improved pain control throughout shift  Outcome: Progressing  Goal: Participates in PT with improved pain control throughout the shift  Outcome: Progressing  Goal: Free from opioid side effects throughout the shift  Outcome: Progressing  Goal: Free from acute confusion related to pain meds throughout the shift  Outcome: Progressing     Problem: Fall/Injury  Goal: Not fall by end of shift  Outcome: Progressing  Goal: Be free from injury by end of the shift  Outcome: Progressing  Goal: Verbalize understanding of personal risk factors for fall in the  hospital  Outcome: Progressing  Goal: Verbalize understanding of risk factor reduction measures to prevent injury from fall in the home  Outcome: Progressing  Goal: Use assistive devices by end of the shift  Outcome: Progressing  Goal: Pace activities to prevent fatigue by end of the shift  Outcome: Progressing

## 2024-11-04 NOTE — PROGRESS NOTES
11/04/24 0812   Discharge Planning   Expected Discharge Disposition SNF  ([facility])     Appeal letter received and sent to facility, once they verify they have received will set up transport for pt     UPDATE 0930: daughter loreta updated     ** do not discharge without speaking to care coordination**

## 2024-11-04 NOTE — PROGRESS NOTES
11/04/24 1219   Discharge Planning   Has discharge transport been arranged? Yes   What day is the transport expected? 11/04/24   What time is the transport expected? 1236 0130 Completed   AVS and goldenrod sent to facility   Daughter updated   RN made aware and given report number

## 2024-11-04 NOTE — PROGRESS NOTES
"Switch to oral antibiotic at discharge Marianela Green is a 74 y.o. female on day 11 of admission presenting with Frequent falls.    Subjective   Feeling ok     Objective     Physical Exam  Vitals reviewed.   Constitutional:       Appearance: Normal appearance.   HENT:      Head: Normocephalic and atraumatic.      Right Ear: Tympanic membrane, ear canal and external ear normal.      Left Ear: Tympanic membrane, ear canal and external ear normal.      Nose: Nose normal.      Mouth/Throat:      Pharynx: Oropharynx is clear.   Eyes:      Extraocular Movements: Extraocular movements intact.      Conjunctiva/sclera: Conjunctivae normal.      Pupils: Pupils are equal, round, and reactive to light.   Cardiovascular:      Rate and Rhythm: Normal rate and regular rhythm.      Pulses: Normal pulses.      Heart sounds: Normal heart sounds.   Pulmonary:      Effort: Pulmonary effort is normal.      Breath sounds: Normal breath sounds.   Abdominal:      General: Abdomen is flat. Bowel sounds are normal.      Palpations: Abdomen is soft.   Musculoskeletal:      Cervical back: Normal range of motion and neck supple.   Skin:     General: Skin is warm and dry.      Comments:   Fungal rash in skin fold   Neurological:      General: No focal deficit present.      Mental Status: She is alert and oriented to person, place, and time.   Psychiatric:         Mood and Affect: Mood normal.         Last Recorded Vitals  Blood pressure 124/87, pulse 66, temperature 36.4 °C (97.5 °F), temperature source Oral, resp. rate 18, height 1.6 m (5' 3\"), weight 97.3 kg (214 lb 8.1 oz), SpO2 95%.  Intake/Output last 3 Shifts:  I/O last 3 completed shifts:  In: 230 (2.4 mL/kg) [P.O.:180; IV Piggyback:50]  Out: 2600 (26.7 mL/kg) [Urine:2600 (0.7 mL/kg/hr)]  Weight: 97.3 kg     Relevant Results               Scheduled medications  cefTRIAXone, 1 g, intravenous, q24h  docusate sodium, 100 mg, oral, BID  enoxaparin, 40 mg, subcutaneous, Daily  insulin " lispro, 0-5 Units, subcutaneous, TID  losartan, 100 mg, oral, Daily  metFORMIN (MOD), 1,000 mg, oral, BID  metoprolol succinate XL, 50 mg, oral, Daily  nystatin, , Topical, q8h  pantoprazole, 40 mg, oral, Daily   Or  pantoprazole, 40 mg, intravenous, Daily  polyethylene glycol, 17 g, oral, Daily      Continuous medications     PRN medications  PRN medications: acetaminophen **OR** acetaminophen **OR** acetaminophen, benzocaine-menthol, dextromethorphan-guaifenesin, guaiFENesin, melatonin, ondansetron **OR** ondansetron  Results for orders placed or performed during the hospital encounter of 10/24/24 (from the past 24 hours)   POCT GLUCOSE   Result Value Ref Range    POCT Glucose 109 (H) 74 - 99 mg/dL   POCT GLUCOSE   Result Value Ref Range    POCT Glucose 108 (H) 74 - 99 mg/dL   POCT GLUCOSE   Result Value Ref Range    POCT Glucose 92 74 - 99 mg/dL   POCT GLUCOSE   Result Value Ref Range    POCT Glucose 145 (H) 74 - 99 mg/dL     No results found.                 Assessment/Plan   Assessment & Plan  Frequent falls    Bilateral lower leg cellulitis    Candidiasis of skin    Elevated blood sugar level    Hypertension    Failure to thrive (child)    COPD (chronic obstructive pulmonary disease) (Multi)    Abnormal chest x-ray    UTI (urinary tract infection)    Leg cellulitis is  resolved  Urine cultures growing E. coli  Switch to oral antibiotic at discharge  continue nystatin cream for the fungal rash improving  Blood pressure is doing better  COPD stable  A1c 8.6    Continue sliding scale insulin for elevated blood sugar   metformin twice a day  Waiting for rehab placement     I spent 2 minutes in the professional and overall care of this patient.      Shelbie Ling MD

## 2024-11-04 NOTE — CARE PLAN
The patient's goals for the shift include Maintain safety    The clinical goals for the shift include pt will get some rest/sleep      Problem: Pain - Adult  Goal: Verbalizes/displays adequate comfort level or baseline comfort level  Outcome: Progressing     Problem: Safety - Adult  Goal: Free from fall injury  Outcome: Progressing     Problem: Discharge Planning  Goal: Discharge to home or other facility with appropriate resources  Outcome: Progressing     Problem: Chronic Conditions and Co-morbidities  Goal: Patient's chronic conditions and co-morbidity symptoms are monitored and maintained or improved  Outcome: Progressing     Problem: Skin  Goal: Decreased wound size/increased tissue granulation at next dressing change  Outcome: Progressing  Flowsheets (Taken 11/4/2024 0118)  Decreased wound size/increased tissue granulation at next dressing change:   Protective dressings over bony prominences   Utilize specialty bed per algorithm  Goal: Participates in plan/prevention/treatment measures  Outcome: Progressing  Flowsheets (Taken 11/4/2024 0118)  Participates in plan/prevention/treatment measures: Elevate heels  Goal: Prevent/manage excess moisture  Outcome: Progressing  Flowsheets (Taken 11/4/2024 0118)  Prevent/manage excess moisture:   Cleanse incontinence/protect with barrier cream   Moisturize dry skin  Goal: Prevent/minimize sheer/friction injuries  Outcome: Progressing  Flowsheets (Taken 11/4/2024 0118)  Prevent/minimize sheer/friction injuries:   Use pull sheet   Complete micro-shifts as needed if patient unable. Adjust patient position to relieve pressure points, not a full turn   HOB 30 degrees or less   Utilize specialty bed per algorithm  Goal: Promote/optimize nutrition  Outcome: Progressing  Flowsheets (Taken 11/4/2024 0118)  Promote/optimize nutrition:   Monitor/record intake including meals   Consume > 50% meals/supplements   Offer water/supplements/favorite foods  Goal: Promote skin  healing  Outcome: Progressing  Flowsheets (Taken 11/4/2024 0118)  Promote skin healing:   Protective dressings over bony prominences   Assess skin/pad under line(s)/device(s)     Problem: Pain  Goal: Takes deep breaths with improved pain control throughout the shift  Outcome: Progressing  Goal: Turns in bed with improved pain control throughout the shift  Outcome: Progressing  Goal: Walks with improved pain control throughout the shift  Outcome: Progressing  Goal: Performs ADL's with improved pain control throughout shift  Outcome: Progressing  Goal: Participates in PT with improved pain control throughout the shift  Outcome: Progressing  Goal: Free from opioid side effects throughout the shift  Outcome: Progressing  Goal: Free from acute confusion related to pain meds throughout the shift  Outcome: Progressing     Problem: Fall/Injury  Goal: Not fall by end of shift  Outcome: Progressing  Goal: Be free from injury by end of the shift  Outcome: Progressing  Goal: Verbalize understanding of personal risk factors for fall in the hospital  Outcome: Progressing  Goal: Verbalize understanding of risk factor reduction measures to prevent injury from fall in the home  Outcome: Progressing  Goal: Use assistive devices by end of the shift  Outcome: Progressing  Goal: Pace activities to prevent fatigue by end of the shift  Outcome: Progressing     Problem: Nutrition  Goal: Oral intake greater 75%  Outcome: Progressing

## 2024-11-04 NOTE — DISCHARGE SUMMARY
Discharge Diagnosis  Frequent falls    Issues Requiring Follow-Up  Diabetes  Hypertension  Cellulitis leg  UTI    Test Results Pending At Discharge  Pending Labs       No current pending labs.            Hospital Course   Marianela Green is a 74 y.o. female presenting with generalized malaise and fatigue.  Apparently patient had a fall a week ago.  Since then just has no energy.  She does have a home health care nurse who comes and checks on her.  Patient has not seen the primary care physician for 2 years.  She states she does not have a ride.  She gets Meals on Wheels.  Her daughter provides her minimal care.  She did have disheveled appearance on presentation.  She was contaminated with urine and feces.  She has overgrown nails very unkept.  She is not a very good historian.  She lives alone having difficulty ambulating   Treated for UTI.  Treated for cellulitis.  Treated for diabetes which she has not taken medication for few years.  Treated for blood pressure which was uncontrolled.  Physical therapy recommended rehab for unsteady gait.  Discharged to rehab    Pertinent Physical Exam At Time of Discharge  Physical Exam  Vitals reviewed.   Constitutional:       Appearance: Normal appearance.   HENT:      Head: Normocephalic and atraumatic.      Right Ear: Tympanic membrane, ear canal and external ear normal.      Left Ear: Tympanic membrane, ear canal and external ear normal.      Nose: Nose normal.      Mouth/Throat:      Pharynx: Oropharynx is clear.   Eyes:      Extraocular Movements: Extraocular movements intact.      Conjunctiva/sclera: Conjunctivae normal.      Pupils: Pupils are equal, round, and reactive to light.   Cardiovascular:      Rate and Rhythm: Normal rate and regular rhythm.      Pulses: Normal pulses.      Heart sounds: Normal heart sounds.   Pulmonary:      Effort: Pulmonary effort is normal.      Breath sounds: Normal breath sounds.   Abdominal:      General: Abdomen is flat. Bowel sounds  are normal.      Palpations: Abdomen is soft.   Musculoskeletal:      Cervical back: Normal range of motion and neck supple.   Skin:     General: Skin is warm and dry.   Neurological:      General: No focal deficit present.      Mental Status: She is alert and oriented to person, place, and time.      Motor: Weakness present.      Coordination: Coordination abnormal.      Gait: Gait abnormal.   Psychiatric:         Mood and Affect: Mood normal.         Home Medications     Medication List      START taking these medications     acetaminophen 325 mg tablet; Commonly known as: Tylenol; Take 2 tablets   (650 mg) by mouth every 4 hours if needed for mild pain (1 - 3).; Notes to   patient: IF NEEDED   insulin lispro 100 unit/mL injection; Inject 0-5 Units under the skin 3   times daily (morning, midday, late afternoon). Take as directed per   insulin instructions.   losartan 100 mg tablet; Commonly known as: Cozaar; Take 1 tablet (100   mg) by mouth once daily.; Notes to patient: ONCE DAILY   metFORMIN (MOD) 1,000 mg 24 hr tablet; Commonly known as: Glumetza; Take   1 tablet (1,000 mg) by mouth 2 times daily (morning and late afternoon).   Do not crush, chew, or split.; Notes to patient: 1 TIMES DAILY   metoprolol succinate XL 50 mg 24 hr tablet; Commonly known as:   Toprol-XL; Take 1 tablet (50 mg) by mouth once daily. Do not crush or   chew.; Notes to patient: ONCE DAILY   nystatin 100,000 unit/gram powder; Commonly known as: Mycostatin; Apply   topically every 8 hours.; Notes to patient: EVERY 8 HOURS   pantoprazole 40 mg EC tablet; Commonly known as: ProtoNix; Take 1 tablet   (40 mg) by mouth once daily. Do not crush, chew, or split.; Notes to   patient: ONCE DAILY   polyethylene glycol 17 gram packet; Commonly known as: Glycolax,   Miralax; Take 17 g by mouth once daily.; Notes to patient: ONCE DAILY       Outpatient Follow-Up  No future appointments.    Shelbie Ling MD

## 2024-11-05 ENCOUNTER — NURSING HOME VISIT (OUTPATIENT)
Dept: POST ACUTE CARE | Facility: EXTERNAL LOCATION | Age: 74
End: 2024-11-05
Payer: MEDICARE

## 2024-11-05 DIAGNOSIS — Z91.81 AT RISK FOR FALLS: ICD-10-CM

## 2024-11-05 DIAGNOSIS — R62.7 ADULT FAILURE TO THRIVE: ICD-10-CM

## 2024-11-05 DIAGNOSIS — L03.116 BILATERAL LOWER LEG CELLULITIS: Primary | ICD-10-CM

## 2024-11-05 DIAGNOSIS — I10 HYPERTENSION, UNSPECIFIED TYPE: ICD-10-CM

## 2024-11-05 DIAGNOSIS — R53.1 WEAKNESS: ICD-10-CM

## 2024-11-05 DIAGNOSIS — R73.9 HYPERGLYCEMIA: ICD-10-CM

## 2024-11-05 DIAGNOSIS — D49.6 NEOPLASM OF UNSPECIFIED BEHAVIOR OF BRAIN (MULTI): ICD-10-CM

## 2024-11-05 DIAGNOSIS — L03.115 BILATERAL LOWER LEG CELLULITIS: Primary | ICD-10-CM

## 2024-11-05 DIAGNOSIS — R93.89 ABNORMAL CHEST X-RAY: ICD-10-CM

## 2024-11-05 DIAGNOSIS — B37.9 CANDIDA INFECTION: ICD-10-CM

## 2024-11-05 DIAGNOSIS — E66.01 OBESITY, MORBID (MULTI): ICD-10-CM

## 2024-11-05 DIAGNOSIS — J44.9 CHRONIC OBSTRUCTIVE PULMONARY DISEASE, UNSPECIFIED COPD TYPE (MULTI): ICD-10-CM

## 2024-11-05 PROCEDURE — 99305 1ST NF CARE MODERATE MDM 35: CPT | Performed by: INTERNAL MEDICINE

## 2024-11-05 NOTE — LETTER
Patient: Marianela Green  : 1950    Encounter Date: 2024    PLACE OF SERVICE:  [facility].    This is new/initial history and physical.    Subjective  Patient ID: Marianela Green is a 74 y.o. female who presents for New Patient Visit.    Ms. Marianela Green is a 74-year-old female with history of bilateral lower extremity cellulitis.  She suffers from frequent falls and is unable to care for herself.  She requires supportive care.    Review of Systems   Constitutional:  Negative for chills and fever.   Cardiovascular:  Negative for chest pain.   All other systems reviewed and are negative.    Objective  /78   Pulse 78   Temp 36.8 °C (98.2 °F)   Resp 18     Physical Exam  Vitals reviewed.   Constitutional:       Comments: This is a well-developed, well-nourished female, lying in bed, appearing weak.   HENT:      Right Ear: Tympanic membrane, ear canal and external ear normal.      Left Ear: Tympanic membrane, ear canal and external ear normal.   Eyes:      General: No scleral icterus.     Pupils: Pupils are equal, round, and reactive to light.   Neck:      Vascular: No carotid bruit.   Cardiovascular:      Heart sounds: Normal heart sounds, S1 normal and S2 normal. No murmur heard.     No friction rub.   Pulmonary:      Effort: Pulmonary effort is normal.      Breath sounds: Decreased breath sounds (throughout) present.   Abdominal:      Palpations: There is no hepatomegaly, splenomegaly or mass.   Musculoskeletal:         General: No swelling or deformity. Normal range of motion.      Cervical back: Neck supple.      Right lower leg: No edema.      Left lower leg: No edema.      Comments: Lower extremities show bilateral lower extremity cellulitis present.   Lymphadenopathy:      Cervical: No cervical adenopathy.      Upper Body:      Right upper body: No axillary adenopathy.      Left upper body: No axillary adenopathy.      Lower Body: No right inguinal adenopathy. No left  inguinal adenopathy.   Neurological:      Mental Status: She is oriented to person, place, and time. She is lethargic.      Cranial Nerves: Cranial nerves 2-12 are intact. No cranial nerve deficit.      Sensory: No sensory deficit.      Motor: Motor function is intact. No weakness.      Gait: Gait is intact.      Deep Tendon Reflexes: Reflexes normal.   Psychiatric:         Mood and Affect: Mood normal. Mood is not anxious or depressed. Affect is not angry.         Behavior: Behavior is not agitated.         Thought Content: Thought content normal.         Judgment: Judgment normal.     LAB WORK: Laboratory studies reviewed.    Assessment/Plan  Problem List Items Addressed This Visit             ICD-10-CM       Cardiac and Vasculature    Hypertension I10       Infectious Diseases    Bilateral lower leg cellulitis - Primary L03.116, L03.115       Pulmonary and Pneumonias    COPD (chronic obstructive pulmonary disease) (Multi) J44.9    Abnormal chest x-ray R93.89     Other Visit Diagnoses         Codes    Adult failure to thrive     R62.7    Hyperglycemia     R73.9    Candida infection     B37.9    Weakness     R53.1    At risk for falls     Z91.81        1. Bilateral lower leg cellulitis, on antibiotic.  2. COPD, on bronchodilator therapy.  3. Adult failure to thrive, encouraged to eat.  4. Hypertension, med controlled.  5. Recent episode of hyperglycemia, monitor sugar level.  6. Abnormal chest x-ray, follow-up with Pulmonology.  7. Candida infection, on Nystatin.  8. Weakness, on PT/OT.  9. Fall risk, fall precautions.    Scribe Attestation  By signing my name below, IDalila Scribe attest that this documentation has been prepared under the direction and in the presence of Forest Ling MD.     All medical record entries made by the scribe were personally dictated by me I have reviewed the chart and agree the record accurately reflects my personal performance of his history physical examination and  management      Electronically Signed By: Forest Ling MD   11/14/24 12:24 AM

## 2024-11-10 ENCOUNTER — NURSING HOME VISIT (OUTPATIENT)
Dept: POST ACUTE CARE | Facility: EXTERNAL LOCATION | Age: 74
End: 2024-11-10
Payer: MEDICARE

## 2024-11-10 DIAGNOSIS — L03.116 BILATERAL LOWER LEG CELLULITIS: Primary | ICD-10-CM

## 2024-11-10 DIAGNOSIS — R73.9 HYPERGLYCEMIA: ICD-10-CM

## 2024-11-10 DIAGNOSIS — R93.89 ABNORMAL CHEST X-RAY: ICD-10-CM

## 2024-11-10 DIAGNOSIS — B37.9 CANDIDA INFECTION: ICD-10-CM

## 2024-11-10 DIAGNOSIS — Z91.81 AT RISK FOR FALLS: ICD-10-CM

## 2024-11-10 DIAGNOSIS — J44.9 CHRONIC OBSTRUCTIVE PULMONARY DISEASE, UNSPECIFIED COPD TYPE (MULTI): ICD-10-CM

## 2024-11-10 DIAGNOSIS — R62.7 ADULT FAILURE TO THRIVE: ICD-10-CM

## 2024-11-10 DIAGNOSIS — L03.115 BILATERAL LOWER LEG CELLULITIS: Primary | ICD-10-CM

## 2024-11-10 DIAGNOSIS — I10 HYPERTENSION, UNSPECIFIED TYPE: ICD-10-CM

## 2024-11-10 DIAGNOSIS — R53.1 WEAKNESS: ICD-10-CM

## 2024-11-10 PROCEDURE — 99308 SBSQ NF CARE LOW MDM 20: CPT | Performed by: INTERNAL MEDICINE

## 2024-11-10 NOTE — LETTER
Patient: Marianela Green  : 1950    Encounter Date: 11/10/2024    PLACE OF SERVICE:  [facility].    This is a subsequent visit.    Subjective  Patient ID: Marianela Green is a 74 y.o. female who presents for Follow-up.    Ms. Marianela Green is a 74-year-old female with history of COPD who suffers from lower extremity cellulitis.  She is unable to care for herself and requires supportive care.    Review of Systems   Constitutional:  Negative for chills and fever.   Cardiovascular:  Negative for chest pain.   All other systems reviewed and are negative.    Objective  /80   Pulse 76   Temp 36.7 °C (98 °F)   Resp 18     Physical Exam  Vitals reviewed.   Constitutional:       Comments: This is a well-developed, well-nourished female, lying in bed, appearing weak.   HENT:      Right Ear: Tympanic membrane, ear canal and external ear normal.      Left Ear: Tympanic membrane, ear canal and external ear normal.   Eyes:      General: No scleral icterus.     Pupils: Pupils are equal, round, and reactive to light.   Neck:      Vascular: No carotid bruit.   Cardiovascular:      Heart sounds: Normal heart sounds, S1 normal and S2 normal. No murmur heard.     No friction rub.   Pulmonary:      Effort: Pulmonary effort is normal.      Breath sounds: Decreased breath sounds (throughout) present.   Abdominal:      Palpations: There is no hepatomegaly, splenomegaly or mass.   Musculoskeletal:         General: No swelling or deformity. Normal range of motion.      Cervical back: Neck supple.      Right lower leg: Edema (with cellulitic changes) present.      Left lower leg: Edema (with cellulitic changes) present.   Lymphadenopathy:      Cervical: No cervical adenopathy.      Upper Body:      Right upper body: No axillary adenopathy.      Left upper body: No axillary adenopathy.      Lower Body: No right inguinal adenopathy. No left inguinal adenopathy.   Neurological:      Mental Status: She is oriented  to person, place, and time. She is lethargic.      Cranial Nerves: Cranial nerves 2-12 are intact. No cranial nerve deficit.      Sensory: No sensory deficit.      Motor: Motor function is intact. No weakness.      Gait: Gait is intact.      Deep Tendon Reflexes: Reflexes normal.   Psychiatric:         Mood and Affect: Mood normal. Mood is not anxious or depressed. Affect is not angry.         Behavior: Behavior is not agitated.         Thought Content: Thought content normal.         Judgment: Judgment normal.     LAB WORK: Laboratory studies reviewed.    Assessment/Plan  Problem List Items Addressed This Visit             ICD-10-CM       Cardiac and Vasculature    Hypertension I10       Infectious Diseases    Bilateral lower leg cellulitis - Primary L03.116, L03.115       Pulmonary and Pneumonias    COPD (chronic obstructive pulmonary disease) (Multi) J44.9    Abnormal chest x-ray R93.89     Other Visit Diagnoses         Codes    Adult failure to thrive     R62.7    Hyperglycemia     R73.9    Candida infection     B37.9    Weakness     R53.1    At risk for falls     Z91.81        1. Cellulitis, continue antibiotics.  2. COPD, on bronchodilator therapy.  3. Adult failure to thrive, encouraged to eat.  4. Hypertension, med controlled.  5. Recent hyperglycemia, monitor glucose level.  6. Abnormal chest x-ray, follow-up with Pulmonology.  7. Candida infection, on Nystatin.  8. Weakness, on PT/OT.  9. Fall risk, fall precautions.    Scribe Attestation  By signing my name below, IDalila Scribe attest that this documentation has been prepared under the direction and in the presence of Forest Ling MD.     All medical record entries made by the scribe were personally dictated by me I have reviewed the chart and agree the record accurately reflects my personal performance of his history physical examination and management      Electronically Signed By: Forest Ling MD   11/20/24  9:21 AM

## 2024-11-11 ENCOUNTER — TELEPHONE (OUTPATIENT)
Dept: PRIMARY CARE | Facility: CLINIC | Age: 74
End: 2024-11-11
Payer: MEDICARE

## 2024-11-11 NOTE — TELEPHONE ENCOUNTER
----- Message from Dariana HERNANDEZ sent at 11/11/2024  8:50 AM EST -----  Regarding: FW: pain level very high    ----- Message -----  From: Bisi Walker  Sent: 11/10/2024   8:54 AM EST  [facility]  Subject: pain level very high                             Good Morning I have a patient daughter on the line stating her mom is in a lot of pain and wanted to know if the Dr can go see her to see what's going on , she can be reached @[phone].     Thank you so much

## 2024-11-12 VITALS
DIASTOLIC BLOOD PRESSURE: 80 MMHG | RESPIRATION RATE: 18 BRPM | TEMPERATURE: 98 F | SYSTOLIC BLOOD PRESSURE: 128 MMHG | HEART RATE: 76 BPM

## 2024-11-12 VITALS
TEMPERATURE: 98.2 F | DIASTOLIC BLOOD PRESSURE: 78 MMHG | HEART RATE: 78 BPM | SYSTOLIC BLOOD PRESSURE: 126 MMHG | RESPIRATION RATE: 18 BRPM

## 2024-11-12 ASSESSMENT — ENCOUNTER SYMPTOMS
FEVER: 0
CHILLS: 0
FEVER: 0
CHILLS: 0

## 2024-11-12 NOTE — PROGRESS NOTES
PLACE OF SERVICE:  [facility].    This is a subsequent visit.    Subjective   Patient ID: Marianela Green is a 74 y.o. female who presents for Follow-up.    Ms. Marianela Green is a 74-year-old female with history of COPD who suffers from lower extremity cellulitis.  She is unable to care for herself and requires supportive care.    Review of Systems   Constitutional:  Negative for chills and fever.   Cardiovascular:  Negative for chest pain.   All other systems reviewed and are negative.    Objective   /80   Pulse 76   Temp 36.7 °C (98 °F)   Resp 18     Physical Exam  Vitals reviewed.   Constitutional:       Comments: This is a well-developed, well-nourished female, lying in bed, appearing weak.   HENT:      Right Ear: Tympanic membrane, ear canal and external ear normal.      Left Ear: Tympanic membrane, ear canal and external ear normal.   Eyes:      General: No scleral icterus.     Pupils: Pupils are equal, round, and reactive to light.   Neck:      Vascular: No carotid bruit.   Cardiovascular:      Heart sounds: Normal heart sounds, S1 normal and S2 normal. No murmur heard.     No friction rub.   Pulmonary:      Effort: Pulmonary effort is normal.      Breath sounds: Decreased breath sounds (throughout) present.   Abdominal:      Palpations: There is no hepatomegaly, splenomegaly or mass.   Musculoskeletal:         General: No swelling or deformity. Normal range of motion.      Cervical back: Neck supple.      Right lower leg: Edema (with cellulitic changes) present.      Left lower leg: Edema (with cellulitic changes) present.   Lymphadenopathy:      Cervical: No cervical adenopathy.      Upper Body:      Right upper body: No axillary adenopathy.      Left upper body: No axillary adenopathy.      Lower Body: No right inguinal adenopathy. No left inguinal adenopathy.   Neurological:      Mental Status: She is oriented to person, place, and time. She is lethargic.      Cranial Nerves: Cranial  nerves 2-12 are intact. No cranial nerve deficit.      Sensory: No sensory deficit.      Motor: Motor function is intact. No weakness.      Gait: Gait is intact.      Deep Tendon Reflexes: Reflexes normal.   Psychiatric:         Mood and Affect: Mood normal. Mood is not anxious or depressed. Affect is not angry.         Behavior: Behavior is not agitated.         Thought Content: Thought content normal.         Judgment: Judgment normal.     LAB WORK: Laboratory studies reviewed.    Assessment/Plan   Problem List Items Addressed This Visit             ICD-10-CM       Cardiac and Vasculature    Hypertension I10       Infectious Diseases    Bilateral lower leg cellulitis - Primary L03.116, L03.115       Pulmonary and Pneumonias    COPD (chronic obstructive pulmonary disease) (Multi) J44.9    Abnormal chest x-ray R93.89     Other Visit Diagnoses         Codes    Adult failure to thrive     R62.7    Hyperglycemia     R73.9    Candida infection     B37.9    Weakness     R53.1    At risk for falls     Z91.81        1. Cellulitis, continue antibiotics.  2. COPD, on bronchodilator therapy.  3. Adult failure to thrive, encouraged to eat.  4. Hypertension, med controlled.  5. Recent hyperglycemia, monitor glucose level.  6. Abnormal chest x-ray, follow-up with Pulmonology.  7. Candida infection, on Nystatin.  8. Weakness, on PT/OT.  9. Fall risk, fall precautions.    Scribe Attestation  By signing my name below, IDalila Scribe attest that this documentation has been prepared under the direction and in the presence of Forest Ling MD.     All medical record entries made by the scribe were personally dictated by me I have reviewed the chart and agree the record accurately reflects my personal performance of his history physical examination and management

## 2024-11-12 NOTE — PROGRESS NOTES
PLACE OF SERVICE:  [facility].    This is new/initial history and physical.    Subjective   Patient ID: Marianela Green is a 74 y.o. female who presents for New Patient Visit.    Ms. Marianela Green is a 74-year-old female with history of bilateral lower extremity cellulitis.  She suffers from frequent falls and is unable to care for herself.  She requires supportive care.    Review of Systems   Constitutional:  Negative for chills and fever.   Cardiovascular:  Negative for chest pain.   All other systems reviewed and are negative.    Objective   /78   Pulse 78   Temp 36.8 °C (98.2 °F)   Resp 18     Physical Exam  Vitals reviewed.   Constitutional:       Comments: This is a well-developed, well-nourished female, lying in bed, appearing weak.   HENT:      Right Ear: Tympanic membrane, ear canal and external ear normal.      Left Ear: Tympanic membrane, ear canal and external ear normal.   Eyes:      General: No scleral icterus.     Pupils: Pupils are equal, round, and reactive to light.   Neck:      Vascular: No carotid bruit.   Cardiovascular:      Heart sounds: Normal heart sounds, S1 normal and S2 normal. No murmur heard.     No friction rub.   Pulmonary:      Effort: Pulmonary effort is normal.      Breath sounds: Decreased breath sounds (throughout) present.   Abdominal:      Palpations: There is no hepatomegaly, splenomegaly or mass.   Musculoskeletal:         General: No swelling or deformity. Normal range of motion.      Cervical back: Neck supple.      Right lower leg: No edema.      Left lower leg: No edema.      Comments: Lower extremities show bilateral lower extremity cellulitis present.   Lymphadenopathy:      Cervical: No cervical adenopathy.      Upper Body:      Right upper body: No axillary adenopathy.      Left upper body: No axillary adenopathy.      Lower Body: No right inguinal adenopathy. No left inguinal adenopathy.   Neurological:      Mental Status: She is oriented to  person, place, and time. She is lethargic.      Cranial Nerves: Cranial nerves 2-12 are intact. No cranial nerve deficit.      Sensory: No sensory deficit.      Motor: Motor function is intact. No weakness.      Gait: Gait is intact.      Deep Tendon Reflexes: Reflexes normal.   Psychiatric:         Mood and Affect: Mood normal. Mood is not anxious or depressed. Affect is not angry.         Behavior: Behavior is not agitated.         Thought Content: Thought content normal.         Judgment: Judgment normal.     LAB WORK: Laboratory studies reviewed.    Assessment/Plan   Problem List Items Addressed This Visit             ICD-10-CM       Cardiac and Vasculature    Hypertension I10       Infectious Diseases    Bilateral lower leg cellulitis - Primary L03.116, L03.115       Pulmonary and Pneumonias    COPD (chronic obstructive pulmonary disease) (Multi) J44.9    Abnormal chest x-ray R93.89     Other Visit Diagnoses         Codes    Adult failure to thrive     R62.7    Hyperglycemia     R73.9    Candida infection     B37.9    Weakness     R53.1    At risk for falls     Z91.81        1. Bilateral lower leg cellulitis, on antibiotic.  2. COPD, on bronchodilator therapy.  3. Adult failure to thrive, encouraged to eat.  4. Hypertension, med controlled.  5. Recent episode of hyperglycemia, monitor sugar level.  6. Abnormal chest x-ray, follow-up with Pulmonology.  7. Candida infection, on Nystatin.  8. Weakness, on PT/OT.  9. Fall risk, fall precautions.    Scribe Attestation  By signing my name below, IDalila Scribe attest that this documentation has been prepared under the direction and in the presence of Forest Ling MD.     All medical record entries made by the scribe were personally dictated by me I have reviewed the chart and agree the record accurately reflects my personal performance of his history physical examination and management

## 2024-11-14 PROBLEM — E66.01 OBESITY, MORBID (MULTI): Status: ACTIVE | Noted: 2024-11-14

## 2024-11-14 PROBLEM — D49.6 NEOPLASM OF UNSPECIFIED BEHAVIOR OF BRAIN (MULTI): Status: ACTIVE | Noted: 2024-11-14

## 2024-11-17 ENCOUNTER — NURSING HOME VISIT (OUTPATIENT)
Dept: POST ACUTE CARE | Facility: EXTERNAL LOCATION | Age: 74
End: 2024-11-17
Payer: MEDICARE

## 2024-11-17 DIAGNOSIS — J44.9 CHRONIC OBSTRUCTIVE PULMONARY DISEASE, UNSPECIFIED COPD TYPE (MULTI): Primary | ICD-10-CM

## 2024-11-17 DIAGNOSIS — R93.89 ABNORMAL CHEST X-RAY: ICD-10-CM

## 2024-11-17 DIAGNOSIS — R62.7 ADULT FAILURE TO THRIVE: ICD-10-CM

## 2024-11-17 DIAGNOSIS — L03.116 BILATERAL LOWER LEG CELLULITIS: ICD-10-CM

## 2024-11-17 DIAGNOSIS — R53.1 WEAKNESS: ICD-10-CM

## 2024-11-17 DIAGNOSIS — L03.115 BILATERAL LOWER LEG CELLULITIS: ICD-10-CM

## 2024-11-17 DIAGNOSIS — Z91.81 AT RISK FOR FALLS: ICD-10-CM

## 2024-11-17 DIAGNOSIS — R73.9 HYPERGLYCEMIA: ICD-10-CM

## 2024-11-17 DIAGNOSIS — I10 HYPERTENSION, UNSPECIFIED TYPE: ICD-10-CM

## 2024-11-17 DIAGNOSIS — B37.9 CANDIDA INFECTION: ICD-10-CM

## 2024-11-17 PROCEDURE — 99309 SBSQ NF CARE MODERATE MDM 30: CPT | Performed by: INTERNAL MEDICINE

## 2024-11-17 NOTE — LETTER
Patient: Marianela Green  : 1950    Encounter Date: 2024    PLACE OF SERVICE:  [facility]    This is a subsequent visit.    Subjective  Patient ID: Marianela Green is a 74 y.o. female who presents for Follow-up.    Ms. Marianela Green is a 74-year-old female with history of COPD with adult failure to thrive.  She is being treated for cellulitis.  She is unable to care for herself.  She requires supportive care.    Review of Systems   Constitutional:  Negative for chills and fever.   Cardiovascular:  Negative for chest pain.   All other systems reviewed and are negative.    Objective  /82   Pulse 84   Temp 36.9 °C (98.4 °F)   Resp 18     Physical Exam  Vitals reviewed.   Constitutional:       General: She is not in acute distress.     Comments: This is a well-developed, well-nourished female, sitting in a chair   HENT:      Right Ear: Tympanic membrane, ear canal and external ear normal.      Left Ear: Tympanic membrane, ear canal and external ear normal.   Eyes:      General: No scleral icterus.     Pupils: Pupils are equal, round, and reactive to light.   Neck:      Vascular: No carotid bruit.   Cardiovascular:      Heart sounds: Normal heart sounds, S1 normal and S2 normal. No murmur heard.     No friction rub.   Pulmonary:      Effort: Pulmonary effort is normal.      Breath sounds: Decreased breath sounds (throughout.) present.   Abdominal:      Palpations: There is no hepatomegaly, splenomegaly or mass.   Musculoskeletal:         General: No swelling or deformity. Normal range of motion.      Cervical back: Neck supple.      Right lower leg: No edema.      Left lower leg: No edema.   Lymphadenopathy:      Cervical: No cervical adenopathy.      Upper Body:      Right upper body: No axillary adenopathy.      Left upper body: No axillary adenopathy.      Lower Body: No right inguinal adenopathy. No left inguinal adenopathy.   Skin:     Comments: Lower extremities showed receding  cellulitis.   Neurological:      Mental Status: She is oriented to person, place, and time.      Cranial Nerves: Cranial nerves 2-12 are intact. No cranial nerve deficit.      Sensory: No sensory deficit.      Motor: Motor function is intact. No weakness.      Gait: Gait is intact.      Deep Tendon Reflexes: Reflexes normal.   Psychiatric:         Mood and Affect: Mood normal. Mood is not anxious or depressed. Affect is not angry.         Behavior: Behavior is not agitated.         Thought Content: Thought content normal.         Judgment: Judgment normal.     LAB WORK:  Laboratory studies were reviewed.    Assessment/Plan  Problem List Items Addressed This Visit             ICD-10-CM       Cardiac and Vasculature    Hypertension I10       Infectious Diseases    Bilateral lower leg cellulitis L03.116, L03.115       Pulmonary and Pneumonias    COPD (chronic obstructive pulmonary disease) (Multi) - Primary J44.9    Abnormal chest x-ray R93.89     Other Visit Diagnoses         Codes    Adult failure to thrive     R62.7    Hyperglycemia     R73.9    Candida infection     B37.9    Weakness     R53.1    At risk for falls     Z91.81        1. COPD, on bronchodilator therapy.  2. Adult failure to thrive.  Encouraged to eat.  3. Cellulitis, on antibiotic.  4. Recent hyperglycemia.  Monitor hemoglobin A1c.  5. Hypertension, medically controlled.  6. Candida infection, on Nystatin.  7. Abnormal chest x-ray.  Follow with Pulmonology.  8. Weakness, on PT/OT.  9. Fall risk, on fall precautions.    Scribe Attestation  By signing my name below, Mary PUENTE Scribe attest that this documentation has been prepared under the direction and in the presence of Forest Ling MD.     All medical record entries made by the scribe were personally dictated by me I have reviewed the chart and agree the record accurately reflects my personal performance of his history physical examination and management      Electronically Signed By:  Forest Ling MD   11/25/24  9:33 PM

## 2024-11-21 VITALS
TEMPERATURE: 98.4 F | RESPIRATION RATE: 18 BRPM | HEART RATE: 84 BPM | DIASTOLIC BLOOD PRESSURE: 82 MMHG | SYSTOLIC BLOOD PRESSURE: 128 MMHG

## 2024-11-21 ASSESSMENT — ENCOUNTER SYMPTOMS
FEVER: 0
CHILLS: 0

## 2024-11-21 NOTE — PROGRESS NOTES
PLACE OF SERVICE:  [facility]    This is a subsequent visit.    Subjective   Patient ID: Marianela Green is a 74 y.o. female who presents for Follow-up.    Ms. Marianela Green is a 74-year-old female with history of COPD with adult failure to thrive.  She is being treated for cellulitis.  She is unable to care for herself.  She requires supportive care.    Review of Systems   Constitutional:  Negative for chills and fever.   Cardiovascular:  Negative for chest pain.   All other systems reviewed and are negative.    Objective   /82   Pulse 84   Temp 36.9 °C (98.4 °F)   Resp 18     Physical Exam  Vitals reviewed.   Constitutional:       General: She is not in acute distress.     Comments: This is a well-developed, well-nourished female, sitting in a chair   HENT:      Right Ear: Tympanic membrane, ear canal and external ear normal.      Left Ear: Tympanic membrane, ear canal and external ear normal.   Eyes:      General: No scleral icterus.     Pupils: Pupils are equal, round, and reactive to light.   Neck:      Vascular: No carotid bruit.   Cardiovascular:      Heart sounds: Normal heart sounds, S1 normal and S2 normal. No murmur heard.     No friction rub.   Pulmonary:      Effort: Pulmonary effort is normal.      Breath sounds: Decreased breath sounds (throughout.) present.   Abdominal:      Palpations: There is no hepatomegaly, splenomegaly or mass.   Musculoskeletal:         General: No swelling or deformity. Normal range of motion.      Cervical back: Neck supple.      Right lower leg: No edema.      Left lower leg: No edema.   Lymphadenopathy:      Cervical: No cervical adenopathy.      Upper Body:      Right upper body: No axillary adenopathy.      Left upper body: No axillary adenopathy.      Lower Body: No right inguinal adenopathy. No left inguinal adenopathy.   Skin:     Comments: Lower extremities showed receding cellulitis.   Neurological:      Mental Status: She is oriented to  person, place, and time.      Cranial Nerves: Cranial nerves 2-12 are intact. No cranial nerve deficit.      Sensory: No sensory deficit.      Motor: Motor function is intact. No weakness.      Gait: Gait is intact.      Deep Tendon Reflexes: Reflexes normal.   Psychiatric:         Mood and Affect: Mood normal. Mood is not anxious or depressed. Affect is not angry.         Behavior: Behavior is not agitated.         Thought Content: Thought content normal.         Judgment: Judgment normal.     LAB WORK:  Laboratory studies were reviewed.    Assessment/Plan   Problem List Items Addressed This Visit             ICD-10-CM       Cardiac and Vasculature    Hypertension I10       Infectious Diseases    Bilateral lower leg cellulitis L03.116, L03.115       Pulmonary and Pneumonias    COPD (chronic obstructive pulmonary disease) (Multi) - Primary J44.9    Abnormal chest x-ray R93.89     Other Visit Diagnoses         Codes    Adult failure to thrive     R62.7    Hyperglycemia     R73.9    Candida infection     B37.9    Weakness     R53.1    At risk for falls     Z91.81        1. COPD, on bronchodilator therapy.  2. Adult failure to thrive.  Encouraged to eat.  3. Cellulitis, on antibiotic.  4. Recent hyperglycemia.  Monitor hemoglobin A1c.  5. Hypertension, medically controlled.  6. Candida infection, on Nystatin.  7. Abnormal chest x-ray.  Follow with Pulmonology.  8. Weakness, on PT/OT.  9. Fall risk, on fall precautions.    Scribe Attestation  By signing my name below, IMary Scribe attest that this documentation has been prepared under the direction and in the presence of Forest Ling MD.     All medical record entries made by the scribe were personally dictated by me I have reviewed the chart and agree the record accurately reflects my personal performance of his history physical examination and management

## 2024-11-24 ENCOUNTER — NURSING HOME VISIT (OUTPATIENT)
Dept: POST ACUTE CARE | Facility: EXTERNAL LOCATION | Age: 74
End: 2024-11-24
Payer: MEDICARE

## 2024-11-24 DIAGNOSIS — R53.1 WEAKNESS: ICD-10-CM

## 2024-11-24 DIAGNOSIS — I10 HYPERTENSION, UNSPECIFIED TYPE: ICD-10-CM

## 2024-11-24 DIAGNOSIS — B37.9 CANDIDA INFECTION: ICD-10-CM

## 2024-11-24 DIAGNOSIS — L03.90 CELLULITIS, UNSPECIFIED CELLULITIS SITE: ICD-10-CM

## 2024-11-24 DIAGNOSIS — J44.9 CHRONIC OBSTRUCTIVE PULMONARY DISEASE, UNSPECIFIED COPD TYPE (MULTI): Primary | ICD-10-CM

## 2024-11-24 DIAGNOSIS — R93.89 ABNORMAL CHEST X-RAY: ICD-10-CM

## 2024-11-24 DIAGNOSIS — R73.9 HYPERGLYCEMIA: ICD-10-CM

## 2024-11-24 DIAGNOSIS — Z91.81 AT RISK FOR FALLS: ICD-10-CM

## 2024-11-24 DIAGNOSIS — R62.7 ADULT FAILURE TO THRIVE: ICD-10-CM

## 2024-11-24 PROCEDURE — 99309 SBSQ NF CARE MODERATE MDM 30: CPT | Performed by: INTERNAL MEDICINE

## 2024-11-24 NOTE — LETTER
Patient: Marianela Green  : 1950    Encounter Date: 2024    PLACE OF SERVICE:  [facility]    This is a subsequent visit.    Subjective  Patient ID: Marianela Green is a 74 y.o. female who presents for Follow-up.    Ms. Marianela Green is a 74-year-old female with history of COPD with adult failure to thrive.  She is unable to care for herself and requires supportive care.    Review of Systems   Constitutional:  Negative for chills and fever.   Cardiovascular:  Negative for chest pain.   All other systems reviewed and are negative.    Objective  /82   Pulse 80   Temp 36.6 °C (97.9 °F)   Resp 18     Physical Exam  Vitals reviewed.   Constitutional:       General: She is not in acute distress.     Comments: This is a well-developed, well-nourished female, sitting in a chair.   HENT:      Right Ear: Tympanic membrane, ear canal and external ear normal.      Left Ear: Tympanic membrane, ear canal and external ear normal.   Eyes:      General: No scleral icterus.     Pupils: Pupils are equal, round, and reactive to light.   Neck:      Vascular: No carotid bruit.   Cardiovascular:      Heart sounds: Normal heart sounds, S1 normal and S2 normal. No murmur heard.     No friction rub.   Pulmonary:      Breath sounds: Decreased breath sounds (throughout) present.   Abdominal:      Palpations: There is no hepatomegaly, splenomegaly or mass.   Musculoskeletal:         General: No swelling or deformity. Normal range of motion.      Cervical back: Neck supple.      Right lower leg: No edema.      Left lower leg: No edema.      Comments: Lower extremities show resolving cellulitic changes.   Lymphadenopathy:      Cervical: No cervical adenopathy.      Upper Body:      Right upper body: No axillary adenopathy.      Left upper body: No axillary adenopathy.      Lower Body: No right inguinal adenopathy. No left inguinal adenopathy.   Neurological:      Mental Status: She is oriented to person, place,  and time.      Cranial Nerves: Cranial nerves 2-12 are intact. No cranial nerve deficit.      Sensory: No sensory deficit.      Motor: Motor function is intact. No weakness.      Gait: Gait is intact.      Deep Tendon Reflexes: Reflexes normal.   Psychiatric:         Mood and Affect: Mood normal. Mood is not anxious or depressed. Affect is not angry.         Behavior: Behavior is not agitated.         Thought Content: Thought content normal.         Judgment: Judgment normal.     LAB WORK: Laboratory studies reviewed.    Assessment/Plan  Problem List Items Addressed This Visit             ICD-10-CM       Cardiac and Vasculature    Hypertension I10       Pulmonary and Pneumonias    COPD (chronic obstructive pulmonary disease) (Multi) - Primary J44.9    Abnormal chest x-ray R93.89     Other Visit Diagnoses         Codes    Candida infection     B37.9    Cellulitis, unspecified cellulitis site     L03.90    Adult failure to thrive     R62.7    Hyperglycemia     R73.9    Weakness     R53.1    At risk for falls     Z91.81        1. COPD, on bronchodilator therapy.  2. Candida infection, on Nystatin.  3. Cellulitis, on antibiotic.  4. Adult failure to thrive, encouraged to eat.  5. Hypertension, med controlled.  6. Recent hyperglycemia, monitor hemoglobin A1c.  7. Abnormal chest x-ray, follow with Pulmonology.  8. Weakness, on PT/OT.  9. Fall risk, on fall precautions.    Scribe Attestation  By signing my name below, IMary Scribe attest that this documentation has been prepared under the direction and in the presence of Forest Ling MD.     All medical record entries made by the scribe were personally dictated by me I have reviewed the chart and agree the record accurately reflects my personal performance of his history physical examination and management      Electronically Signed By: Forest Ling MD   12/15/24 12:14 AM

## 2024-12-01 ENCOUNTER — NURSING HOME VISIT (OUTPATIENT)
Dept: POST ACUTE CARE | Facility: EXTERNAL LOCATION | Age: 74
End: 2024-12-01
Payer: MEDICARE

## 2024-12-01 DIAGNOSIS — R73.9 HYPERGLYCEMIA: ICD-10-CM

## 2024-12-01 DIAGNOSIS — I10 HYPERTENSION, UNSPECIFIED TYPE: ICD-10-CM

## 2024-12-01 DIAGNOSIS — Z91.81 AT RISK FOR FALLS: ICD-10-CM

## 2024-12-01 DIAGNOSIS — B37.9 CANDIDA INFECTION: ICD-10-CM

## 2024-12-01 DIAGNOSIS — R62.7 ADULT FAILURE TO THRIVE: ICD-10-CM

## 2024-12-01 DIAGNOSIS — J44.9 CHRONIC OBSTRUCTIVE PULMONARY DISEASE, UNSPECIFIED COPD TYPE (MULTI): Primary | ICD-10-CM

## 2024-12-01 DIAGNOSIS — R53.1 WEAKNESS: ICD-10-CM

## 2024-12-01 DIAGNOSIS — L03.90 CELLULITIS, UNSPECIFIED CELLULITIS SITE: ICD-10-CM

## 2024-12-01 DIAGNOSIS — R93.89 ABNORMAL CHEST X-RAY: ICD-10-CM

## 2024-12-01 PROCEDURE — 99309 SBSQ NF CARE MODERATE MDM 30: CPT | Performed by: INTERNAL MEDICINE

## 2024-12-01 NOTE — LETTER
Patient: Marianela Green  : 1950    Encounter Date: 2024    PLACE OF SERVICE:  [facility]    This is a subsequent visit.    Subjective  Patient ID: Marianela Green is a 74 y.o. female who presents for Follow-up.    Ms. Marianela Green is a 74-year-old female with history of COPD with resolving cellulitis.  She is unable to care for herself and requires supportive care.    Review of Systems   Constitutional:  Negative for chills and fever.   Cardiovascular:  Negative for chest pain.   All other systems reviewed and are negative.    Objective  /82   Pulse 82   Temp 36.7 °C (98.1 °F)   Resp 18     Physical Exam  Vitals reviewed.   Constitutional:       General: She is not in acute distress.     Comments: This is a well-developed, well-nourished female, sitting in a chair.   HENT:      Right Ear: Tympanic membrane, ear canal and external ear normal.      Left Ear: Tympanic membrane, ear canal and external ear normal.   Eyes:      General: No scleral icterus.     Pupils: Pupils are equal, round, and reactive to light.   Neck:      Vascular: No carotid bruit.   Cardiovascular:      Heart sounds: Normal heart sounds, S1 normal and S2 normal. No murmur heard.     No friction rub.   Pulmonary:      Breath sounds: Decreased breath sounds (throughout) present.   Abdominal:      Palpations: There is no hepatomegaly, splenomegaly or mass.   Musculoskeletal:         General: No swelling or deformity. Normal range of motion.      Cervical back: Neck supple.      Right lower leg: No edema.      Left lower leg: No edema.   Lymphadenopathy:      Cervical: No cervical adenopathy.      Upper Body:      Right upper body: No axillary adenopathy.      Left upper body: No axillary adenopathy.      Lower Body: No right inguinal adenopathy. No left inguinal adenopathy.   Neurological:      Mental Status: She is oriented to person, place, and time.      Cranial Nerves: Cranial nerves 2-12 are intact. No  cranial nerve deficit.      Sensory: No sensory deficit.      Motor: Motor function is intact. No weakness.      Gait: Gait is intact.      Deep Tendon Reflexes: Reflexes normal.   Psychiatric:         Mood and Affect: Mood normal. Mood is not anxious or depressed. Affect is not angry.         Behavior: Behavior is not agitated.         Thought Content: Thought content normal.         Judgment: Judgment normal.     LAB WORK: Laboratory studies reviewed.    Assessment/Plan  Problem List Items Addressed This Visit             ICD-10-CM       Cardiac and Vasculature    Hypertension I10       Pulmonary and Pneumonias    COPD (chronic obstructive pulmonary disease) (Multi) - Primary J44.9    Abnormal chest x-ray R93.89     Other Visit Diagnoses         Codes    Adult failure to thrive     R62.7    Hyperglycemia     R73.9    Cellulitis, unspecified cellulitis site     L03.90    Candida infection     B37.9    Weakness     R53.1    At risk for falls     Z91.81        1. COPD, on bronchodilator therapy.  2. Adult failure to thrive, encouraged to eat.  3. Hypertension, med controlled.  4. Recent hyperglycemia, monitor hemoglobin A1c.  5. Cellulitis, resolving.  6. Candida infection, on nystatin.  7. Abnormal chest x-ray, follow with Pulmonology.  8. Weakness, on PT/OT.  9. Fall risk, on fall precautions.    Scribe Attestation  By signing my name below, IMary Scribe attest that this documentation has been prepared under the direction and in the presence of Forest Ling MD.     All medical record entries made by the scribe were personally dictated by me I have reviewed the chart and agree the record accurately reflects my personal performance of his history physical examination and management      Electronically Signed By: Forest Ling MD   12/15/24 12:13 AM

## 2024-12-12 VITALS
TEMPERATURE: 97.9 F | DIASTOLIC BLOOD PRESSURE: 82 MMHG | HEART RATE: 80 BPM | SYSTOLIC BLOOD PRESSURE: 128 MMHG | RESPIRATION RATE: 18 BRPM

## 2024-12-12 VITALS
HEART RATE: 82 BPM | SYSTOLIC BLOOD PRESSURE: 126 MMHG | RESPIRATION RATE: 18 BRPM | DIASTOLIC BLOOD PRESSURE: 82 MMHG | TEMPERATURE: 98.1 F

## 2024-12-12 ASSESSMENT — ENCOUNTER SYMPTOMS
FEVER: 0
CHILLS: 0
FEVER: 0
CHILLS: 0

## 2024-12-12 NOTE — PROGRESS NOTES
PLACE OF SERVICE:  [facility]    This is a subsequent visit.    Subjective   Patient ID: Marianela Green is a 74 y.o. female who presents for Follow-up.    Ms. Marianela Green is a 74-year-old female with history of COPD with adult failure to thrive.  She is unable to care for herself and requires supportive care.    Review of Systems   Constitutional:  Negative for chills and fever.   Cardiovascular:  Negative for chest pain.   All other systems reviewed and are negative.    Objective   /82   Pulse 80   Temp 36.6 °C (97.9 °F)   Resp 18     Physical Exam  Vitals reviewed.   Constitutional:       General: She is not in acute distress.     Comments: This is a well-developed, well-nourished female, sitting in a chair.   HENT:      Right Ear: Tympanic membrane, ear canal and external ear normal.      Left Ear: Tympanic membrane, ear canal and external ear normal.   Eyes:      General: No scleral icterus.     Pupils: Pupils are equal, round, and reactive to light.   Neck:      Vascular: No carotid bruit.   Cardiovascular:      Heart sounds: Normal heart sounds, S1 normal and S2 normal. No murmur heard.     No friction rub.   Pulmonary:      Breath sounds: Decreased breath sounds (throughout) present.   Abdominal:      Palpations: There is no hepatomegaly, splenomegaly or mass.   Musculoskeletal:         General: No swelling or deformity. Normal range of motion.      Cervical back: Neck supple.      Right lower leg: No edema.      Left lower leg: No edema.      Comments: Lower extremities show resolving cellulitic changes.   Lymphadenopathy:      Cervical: No cervical adenopathy.      Upper Body:      Right upper body: No axillary adenopathy.      Left upper body: No axillary adenopathy.      Lower Body: No right inguinal adenopathy. No left inguinal adenopathy.   Neurological:      Mental Status: She is oriented to person, place, and time.      Cranial Nerves: Cranial nerves 2-12 are intact. No  cranial nerve deficit.      Sensory: No sensory deficit.      Motor: Motor function is intact. No weakness.      Gait: Gait is intact.      Deep Tendon Reflexes: Reflexes normal.   Psychiatric:         Mood and Affect: Mood normal. Mood is not anxious or depressed. Affect is not angry.         Behavior: Behavior is not agitated.         Thought Content: Thought content normal.         Judgment: Judgment normal.     LAB WORK: Laboratory studies reviewed.    Assessment/Plan   Problem List Items Addressed This Visit             ICD-10-CM       Cardiac and Vasculature    Hypertension I10       Pulmonary and Pneumonias    COPD (chronic obstructive pulmonary disease) (Multi) - Primary J44.9    Abnormal chest x-ray R93.89     Other Visit Diagnoses         Codes    Candida infection     B37.9    Cellulitis, unspecified cellulitis site     L03.90    Adult failure to thrive     R62.7    Hyperglycemia     R73.9    Weakness     R53.1    At risk for falls     Z91.81        1. COPD, on bronchodilator therapy.  2. Candida infection, on Nystatin.  3. Cellulitis, on antibiotic.  4. Adult failure to thrive, encouraged to eat.  5. Hypertension, med controlled.  6. Recent hyperglycemia, monitor hemoglobin A1c.  7. Abnormal chest x-ray, follow with Pulmonology.  8. Weakness, on PT/OT.  9. Fall risk, on fall precautions.    Scribe Attestation  By signing my name below, IMary Scribe attest that this documentation has been prepared under the direction and in the presence of oFrest Ling MD.     All medical record entries made by the scribe were personally dictated by me I have reviewed the chart and agree the record accurately reflects my personal performance of his history physical examination and management

## 2024-12-12 NOTE — PROGRESS NOTES
PLACE OF SERVICE:  [facility]    This is a subsequent visit.    Subjective   Patient ID: Marianela Green is a 74 y.o. female who presents for Follow-up.    Ms. Marianela Green is a 74-year-old female with history of COPD with resolving cellulitis.  She is unable to care for herself and requires supportive care.    Review of Systems   Constitutional:  Negative for chills and fever.   Cardiovascular:  Negative for chest pain.   All other systems reviewed and are negative.    Objective   /82   Pulse 82   Temp 36.7 °C (98.1 °F)   Resp 18     Physical Exam  Vitals reviewed.   Constitutional:       General: She is not in acute distress.     Comments: This is a well-developed, well-nourished female, sitting in a chair.   HENT:      Right Ear: Tympanic membrane, ear canal and external ear normal.      Left Ear: Tympanic membrane, ear canal and external ear normal.   Eyes:      General: No scleral icterus.     Pupils: Pupils are equal, round, and reactive to light.   Neck:      Vascular: No carotid bruit.   Cardiovascular:      Heart sounds: Normal heart sounds, S1 normal and S2 normal. No murmur heard.     No friction rub.   Pulmonary:      Breath sounds: Decreased breath sounds (throughout) present.   Abdominal:      Palpations: There is no hepatomegaly, splenomegaly or mass.   Musculoskeletal:         General: No swelling or deformity. Normal range of motion.      Cervical back: Neck supple.      Right lower leg: No edema.      Left lower leg: No edema.   Lymphadenopathy:      Cervical: No cervical adenopathy.      Upper Body:      Right upper body: No axillary adenopathy.      Left upper body: No axillary adenopathy.      Lower Body: No right inguinal adenopathy. No left inguinal adenopathy.   Neurological:      Mental Status: She is oriented to person, place, and time.      Cranial Nerves: Cranial nerves 2-12 are intact. No cranial nerve deficit.      Sensory: No sensory deficit.      Motor: Motor  function is intact. No weakness.      Gait: Gait is intact.      Deep Tendon Reflexes: Reflexes normal.   Psychiatric:         Mood and Affect: Mood normal. Mood is not anxious or depressed. Affect is not angry.         Behavior: Behavior is not agitated.         Thought Content: Thought content normal.         Judgment: Judgment normal.     LAB WORK: Laboratory studies reviewed.    Assessment/Plan   Problem List Items Addressed This Visit             ICD-10-CM       Cardiac and Vasculature    Hypertension I10       Pulmonary and Pneumonias    COPD (chronic obstructive pulmonary disease) (Multi) - Primary J44.9    Abnormal chest x-ray R93.89     Other Visit Diagnoses         Codes    Adult failure to thrive     R62.7    Hyperglycemia     R73.9    Cellulitis, unspecified cellulitis site     L03.90    Candida infection     B37.9    Weakness     R53.1    At risk for falls     Z91.81        1. COPD, on bronchodilator therapy.  2. Adult failure to thrive, encouraged to eat.  3. Hypertension, med controlled.  4. Recent hyperglycemia, monitor hemoglobin A1c.  5. Cellulitis, resolving.  6. Candida infection, on nystatin.  7. Abnormal chest x-ray, follow with Pulmonology.  8. Weakness, on PT/OT.  9. Fall risk, on fall precautions.    Scribe Attestation  By signing my name below, IMary Scribe attest that this documentation has been prepared under the direction and in the presence of Forest Ling MD.     All medical record entries made by the scribe were personally dictated by me I have reviewed the chart and agree the record accurately reflects my personal performance of his history physical examination and management

## 2025-01-06 ENCOUNTER — NURSING HOME VISIT (OUTPATIENT)
Dept: POST ACUTE CARE | Facility: EXTERNAL LOCATION | Age: 75
End: 2025-01-06
Payer: MEDICARE

## 2025-01-06 DIAGNOSIS — Z91.81 AT RISK FOR FALLING: ICD-10-CM

## 2025-01-06 DIAGNOSIS — Z87.440 HISTORY OF UTI: ICD-10-CM

## 2025-01-06 DIAGNOSIS — R62.7 ADULT FAILURE TO THRIVE: ICD-10-CM

## 2025-01-06 DIAGNOSIS — J44.9 CHRONIC OBSTRUCTIVE PULMONARY DISEASE, UNSPECIFIED COPD TYPE (MULTI): ICD-10-CM

## 2025-01-06 DIAGNOSIS — R53.1 WEAKNESS: ICD-10-CM

## 2025-01-06 DIAGNOSIS — L03.90 CELLULITIS, UNSPECIFIED CELLULITIS SITE: ICD-10-CM

## 2025-01-06 DIAGNOSIS — E66.01 OBESITY, MORBID (MULTI): ICD-10-CM

## 2025-01-06 DIAGNOSIS — U07.1 COVID-19: Primary | ICD-10-CM

## 2025-01-06 DIAGNOSIS — E11.9 TYPE 2 DIABETES MELLITUS WITHOUT COMPLICATION, UNSPECIFIED WHETHER LONG TERM INSULIN USE (MULTI): ICD-10-CM

## 2025-01-06 PROCEDURE — 99309 SBSQ NF CARE MODERATE MDM 30: CPT | Performed by: INTERNAL MEDICINE

## 2025-01-06 NOTE — LETTER
Patient: Marianela Green  : 1950    Encounter Date: 2025    PLACE OF SERVICE:  [facility]    This is a subsequent visit.    Subjective  Patient ID: Marianela Green is a 74 y.o. female who presents for Follow-up.    Ms. Marianela Green is a 74-year-old female with history of COPD and adult failure to thrive.  She is developed COVID-19.  She is unable to care for herself and requires supportive care.    Review of Systems   Constitutional:  Negative for chills and fever.   Cardiovascular:  Negative for chest pain.   All other systems reviewed and are negative.    Objective  /82   Pulse 82   Temp 36.9 °C (98.5 °F)   Resp 18     Physical Exam  Vitals reviewed.   Constitutional:       Comments: This is a well-developed, well-nourished female, lying in bed, appearing weak    HENT:      Right Ear: Tympanic membrane, ear canal and external ear normal.      Left Ear: Tympanic membrane, ear canal and external ear normal.   Eyes:      General: No scleral icterus.     Pupils: Pupils are equal, round, and reactive to light.   Neck:      Vascular: No carotid bruit.   Cardiovascular:      Heart sounds: Normal heart sounds, S1 normal and S2 normal. No murmur heard.     No friction rub.   Pulmonary:      Breath sounds: Decreased breath sounds (throughout) present.   Abdominal:      Palpations: There is no hepatomegaly, splenomegaly or mass.   Musculoskeletal:         General: No swelling or deformity. Normal range of motion.      Cervical back: Neck supple.      Right lower leg: No edema.      Left lower leg: No edema.      Comments: Lower extremities showed residual erythema present.   Lymphadenopathy:      Cervical: No cervical adenopathy.      Upper Body:      Right upper body: No axillary adenopathy.      Left upper body: No axillary adenopathy.      Lower Body: No right inguinal adenopathy. No left inguinal adenopathy.   Neurological:      Mental Status: She is oriented to person, place,  and time. She is lethargic.      Cranial Nerves: Cranial nerves 2-12 are intact. No cranial nerve deficit.      Sensory: No sensory deficit.      Motor: Motor function is intact. No weakness.      Gait: Gait is intact.      Deep Tendon Reflexes: Reflexes normal.   Psychiatric:         Mood and Affect: Mood normal. Mood is not anxious or depressed. Affect is not angry.         Behavior: Behavior is not agitated.         Thought Content: Thought content normal.         Judgment: Judgment normal.     LAB WORK:  Laboratory studies were reviewed.    Assessment/Plan  Problem List Items Addressed This Visit             ICD-10-CM       Pulmonary and Pneumonias    COPD (chronic obstructive pulmonary disease) (Multi) J44.9     Other Visit Diagnoses         Codes    COVID-19    -  Primary U07.1    Type 2 diabetes mellitus without complication, unspecified whether long term insulin use (Multi)     E11.9    Adult failure to thrive     R62.7    Cellulitis, unspecified cellulitis site     L03.90    History of UTI     Z87.440    Weakness     R53.1    At risk for falling     Z91.81        1. COVID-19, currently stable, on symptomatic treatment with isolation.  2. Diabetes, on insulin.  3. COPD, on bronchodilator therapy.  4. Adult failure to thrive.  Encouraged to eat.  5. Cellulitis, improving.  6. History of UTI.  Continue to monitor.  7. Weakness, on PT/OT.  8. Fall risk, on fall precautions.    Scribe Attestation  By signing my name below, IMary Scribe attest that this documentation has been prepared under the direction and in the presence of Forest Ling MD.     All medical record entries made by the scribe were personally dictated by me I have reviewed the chart and agree the record accurately reflects my personal performance of his history physical examination and management      Electronically Signed By: Forest Ling MD   1/14/25 10:32 PM

## 2025-01-07 VITALS
HEART RATE: 82 BPM | TEMPERATURE: 98.5 F | DIASTOLIC BLOOD PRESSURE: 82 MMHG | RESPIRATION RATE: 18 BRPM | SYSTOLIC BLOOD PRESSURE: 126 MMHG

## 2025-01-07 ASSESSMENT — ENCOUNTER SYMPTOMS
FEVER: 0
CHILLS: 0

## 2025-01-07 NOTE — PROGRESS NOTES
PLACE OF SERVICE:  [facility]    This is a subsequent visit.    Subjective   Patient ID: Marianela Green is a 74 y.o. female who presents for Follow-up.    Ms. Marianela Green is a 74-year-old female with history of COPD and adult failure to thrive.  She is developed COVID-19.  She is unable to care for herself and requires supportive care.    Review of Systems   Constitutional:  Negative for chills and fever.   Cardiovascular:  Negative for chest pain.   All other systems reviewed and are negative.    Objective   /82   Pulse 82   Temp 36.9 °C (98.5 °F)   Resp 18     Physical Exam  Vitals reviewed.   Constitutional:       Comments: This is a well-developed, well-nourished female, lying in bed, appearing weak    HENT:      Right Ear: Tympanic membrane, ear canal and external ear normal.      Left Ear: Tympanic membrane, ear canal and external ear normal.   Eyes:      General: No scleral icterus.     Pupils: Pupils are equal, round, and reactive to light.   Neck:      Vascular: No carotid bruit.   Cardiovascular:      Heart sounds: Normal heart sounds, S1 normal and S2 normal. No murmur heard.     No friction rub.   Pulmonary:      Breath sounds: Decreased breath sounds (throughout) present.   Abdominal:      Palpations: There is no hepatomegaly, splenomegaly or mass.   Musculoskeletal:         General: No swelling or deformity. Normal range of motion.      Cervical back: Neck supple.      Right lower leg: No edema.      Left lower leg: No edema.      Comments: Lower extremities showed residual erythema present.   Lymphadenopathy:      Cervical: No cervical adenopathy.      Upper Body:      Right upper body: No axillary adenopathy.      Left upper body: No axillary adenopathy.      Lower Body: No right inguinal adenopathy. No left inguinal adenopathy.   Neurological:      Mental Status: She is oriented to person, place, and time. She is lethargic.      Cranial Nerves: Cranial nerves 2-12 are  intact. No cranial nerve deficit.      Sensory: No sensory deficit.      Motor: Motor function is intact. No weakness.      Gait: Gait is intact.      Deep Tendon Reflexes: Reflexes normal.   Psychiatric:         Mood and Affect: Mood normal. Mood is not anxious or depressed. Affect is not angry.         Behavior: Behavior is not agitated.         Thought Content: Thought content normal.         Judgment: Judgment normal.     LAB WORK:  Laboratory studies were reviewed.    Assessment/Plan   Problem List Items Addressed This Visit             ICD-10-CM       Pulmonary and Pneumonias    COPD (chronic obstructive pulmonary disease) (Multi) J44.9     Other Visit Diagnoses         Codes    COVID-19    -  Primary U07.1    Type 2 diabetes mellitus without complication, unspecified whether long term insulin use (Multi)     E11.9    Adult failure to thrive     R62.7    Cellulitis, unspecified cellulitis site     L03.90    History of UTI     Z87.440    Weakness     R53.1    At risk for falling     Z91.81        1. COVID-19, currently stable, on symptomatic treatment with isolation.  2. Diabetes, on insulin.  3. COPD, on bronchodilator therapy.  4. Adult failure to thrive.  Encouraged to eat.  5. Cellulitis, improving.  6. History of UTI.  Continue to monitor.  7. Weakness, on PT/OT.  8. Fall risk, on fall precautions.    Scribe Attestation  By signing my name below, I, Lesia Arreaga attest that this documentation has been prepared under the direction and in the presence of Forest Ling MD.     All medical record entries made by the scribe were personally dictated by me I have reviewed the chart and agree the record accurately reflects my personal performance of his history physical examination and management

## 2025-02-09 ENCOUNTER — NURSING HOME VISIT (OUTPATIENT)
Dept: POST ACUTE CARE | Facility: EXTERNAL LOCATION | Age: 75
End: 2025-02-09
Payer: MEDICARE

## 2025-02-09 DIAGNOSIS — L03.90 CELLULITIS, UNSPECIFIED CELLULITIS SITE: ICD-10-CM

## 2025-02-09 DIAGNOSIS — E11.9 TYPE 2 DIABETES MELLITUS WITHOUT COMPLICATION, UNSPECIFIED WHETHER LONG TERM INSULIN USE (MULTI): ICD-10-CM

## 2025-02-09 DIAGNOSIS — Z87.440 HISTORY OF UTI: ICD-10-CM

## 2025-02-09 DIAGNOSIS — R53.1 WEAKNESS: ICD-10-CM

## 2025-02-09 DIAGNOSIS — J44.9 CHRONIC OBSTRUCTIVE PULMONARY DISEASE, UNSPECIFIED COPD TYPE (MULTI): Primary | ICD-10-CM

## 2025-02-09 DIAGNOSIS — Z91.81 AT RISK FOR FALLING: ICD-10-CM

## 2025-02-09 DIAGNOSIS — R62.7 ADULT FAILURE TO THRIVE: ICD-10-CM

## 2025-02-09 PROCEDURE — 99308 SBSQ NF CARE LOW MDM 20: CPT | Performed by: INTERNAL MEDICINE

## 2025-02-09 NOTE — LETTER
Patient: Marianela Green  : 1950    Encounter Date: 2025    PLACE OF SERVICE:  [facility].    This is a subsequent visit.    Subjective  Patient ID: Marianela Green is a 75 y.o. female who presents for Follow-up.    Ms. Marianela Green is a 75-year-old female with history of diabetes with adult failure to thrive.  She has a history of COPD.  She is unable to care for herself and requires supportive care.    Review of Systems   Constitutional:  Negative for chills and fever.   Cardiovascular:  Negative for chest pain.   All other systems reviewed and are negative.    Objective  /82   Pulse 82   Temp 36.7 °C (98 °F)   Resp 18     Physical Exam  Vitals reviewed.   Constitutional:       General: She is not in acute distress.     Comments: This is a well-developed, well-nourished female, sitting in a chair.   HENT:      Right Ear: Tympanic membrane, ear canal and external ear normal.      Left Ear: Tympanic membrane, ear canal and external ear normal.   Eyes:      General: No scleral icterus.     Pupils: Pupils are equal, round, and reactive to light.   Neck:      Vascular: No carotid bruit.   Cardiovascular:      Heart sounds: Normal heart sounds, S1 normal and S2 normal. No murmur heard.     No friction rub.   Pulmonary:      Effort: Pulmonary effort is normal.      Breath sounds: Decreased breath sounds (throughout) present.   Abdominal:      Palpations: There is no hepatomegaly, splenomegaly or mass.   Musculoskeletal:         General: No swelling or deformity. Normal range of motion.      Cervical back: Neck supple.      Right lower leg: No edema.      Left lower leg: No edema.   Lymphadenopathy:      Cervical: No cervical adenopathy.      Upper Body:      Right upper body: No axillary adenopathy.      Left upper body: No axillary adenopathy.      Lower Body: No right inguinal adenopathy. No left inguinal adenopathy.   Neurological:      Mental Status: She is oriented to  person, place, and time.      Cranial Nerves: Cranial nerves 2-12 are intact. No cranial nerve deficit.      Sensory: No sensory deficit.      Motor: Motor function is intact. No weakness.      Gait: Gait is intact.      Deep Tendon Reflexes: Reflexes normal.   Psychiatric:         Mood and Affect: Mood normal. Mood is not anxious or depressed. Affect is not angry.         Behavior: Behavior is not agitated.         Thought Content: Thought content normal.         Judgment: Judgment normal.     LAB WORK: Laboratory studies reviewed.    Assessment/Plan  Problem List Items Addressed This Visit             ICD-10-CM       Pulmonary and Pneumonias    COPD (chronic obstructive pulmonary disease) (Multi) - Primary J44.9     Other Visit Diagnoses         Codes    Type 2 diabetes mellitus without complication, unspecified whether long term insulin use (Multi)     E11.9    Adult failure to thrive     R62.7    History of UTI     Z87.440    Cellulitis, unspecified cellulitis site     L03.90    Weakness     R53.1    At risk for falling     Z91.81        1. COPD, on bronchodilator therapy.  2. Diabetes, on insulin.  3. Adult failure to thrive, encouraged to eat.  4. History of UTI, continue to monitor.  5. Recent cellulitis, continue to monitor.  6. Weakness, on PT/OT.  7. Fall risk, fall precautions.    Scribe Attestation  By signing my name below, I, Lesia Montgomery attest that this documentation has been prepared under the direction and in the presence of Forest Ling MD.     All medical record entries made by the scribe were personally dictated by me I have reviewed the chart and agree the record accurately reflects my personal performance of his history physical examination and management      Electronically Signed By: Forest Ling MD   2/13/25 11:25 PM

## 2025-02-11 VITALS
TEMPERATURE: 98 F | SYSTOLIC BLOOD PRESSURE: 126 MMHG | DIASTOLIC BLOOD PRESSURE: 82 MMHG | HEART RATE: 82 BPM | RESPIRATION RATE: 18 BRPM

## 2025-02-11 ASSESSMENT — ENCOUNTER SYMPTOMS
CHILLS: 0
FEVER: 0

## 2025-02-11 NOTE — PROGRESS NOTES
PLACE OF SERVICE:  [facility].    This is a subsequent visit.    Subjective   Patient ID: Marianela Green is a 75 y.o. female who presents for Follow-up.    Ms. Marianela Green is a 75-year-old female with history of diabetes with adult failure to thrive.  She has a history of COPD.  She is unable to care for herself and requires supportive care.    Review of Systems   Constitutional:  Negative for chills and fever.   Cardiovascular:  Negative for chest pain.   All other systems reviewed and are negative.    Objective   /82   Pulse 82   Temp 36.7 °C (98 °F)   Resp 18     Physical Exam  Vitals reviewed.   Constitutional:       General: She is not in acute distress.     Comments: This is a well-developed, well-nourished female, sitting in a chair.   HENT:      Right Ear: Tympanic membrane, ear canal and external ear normal.      Left Ear: Tympanic membrane, ear canal and external ear normal.   Eyes:      General: No scleral icterus.     Pupils: Pupils are equal, round, and reactive to light.   Neck:      Vascular: No carotid bruit.   Cardiovascular:      Heart sounds: Normal heart sounds, S1 normal and S2 normal. No murmur heard.     No friction rub.   Pulmonary:      Effort: Pulmonary effort is normal.      Breath sounds: Decreased breath sounds (throughout) present.   Abdominal:      Palpations: There is no hepatomegaly, splenomegaly or mass.   Musculoskeletal:         General: No swelling or deformity. Normal range of motion.      Cervical back: Neck supple.      Right lower leg: No edema.      Left lower leg: No edema.   Lymphadenopathy:      Cervical: No cervical adenopathy.      Upper Body:      Right upper body: No axillary adenopathy.      Left upper body: No axillary adenopathy.      Lower Body: No right inguinal adenopathy. No left inguinal adenopathy.   Neurological:      Mental Status: She is oriented to person, place, and time.      Cranial Nerves: Cranial nerves 2-12 are intact.  No cranial nerve deficit.      Sensory: No sensory deficit.      Motor: Motor function is intact. No weakness.      Gait: Gait is intact.      Deep Tendon Reflexes: Reflexes normal.   Psychiatric:         Mood and Affect: Mood normal. Mood is not anxious or depressed. Affect is not angry.         Behavior: Behavior is not agitated.         Thought Content: Thought content normal.         Judgment: Judgment normal.     LAB WORK: Laboratory studies reviewed.    Assessment/Plan   Problem List Items Addressed This Visit             ICD-10-CM       Pulmonary and Pneumonias    COPD (chronic obstructive pulmonary disease) (Multi) - Primary J44.9     Other Visit Diagnoses         Codes    Type 2 diabetes mellitus without complication, unspecified whether long term insulin use (Multi)     E11.9    Adult failure to thrive     R62.7    History of UTI     Z87.440    Cellulitis, unspecified cellulitis site     L03.90    Weakness     R53.1    At risk for falling     Z91.81        1. COPD, on bronchodilator therapy.  2. Diabetes, on insulin.  3. Adult failure to thrive, encouraged to eat.  4. History of UTI, continue to monitor.  5. Recent cellulitis, continue to monitor.  6. Weakness, on PT/OT.  7. Fall risk, fall precautions.    Scribe Attestation  By signing my name below, I, Lesia Montgomery attest that this documentation has been prepared under the direction and in the presence of Forest Ling MD.     All medical record entries made by the scribe were personally dictated by me I have reviewed the chart and agree the record accurately reflects my personal performance of his history physical examination and management

## 2025-03-01 ENCOUNTER — NURSING HOME VISIT (OUTPATIENT)
Dept: POST ACUTE CARE | Facility: EXTERNAL LOCATION | Age: 75
End: 2025-03-01
Payer: MEDICARE

## 2025-03-01 DIAGNOSIS — J44.9 COPD ON LONG-TERM INHALED STEROID THERAPY (MULTI): Primary | ICD-10-CM

## 2025-03-01 DIAGNOSIS — R53.1 WEAKNESS: ICD-10-CM

## 2025-03-01 DIAGNOSIS — R62.7 ADULT FAILURE TO THRIVE: ICD-10-CM

## 2025-03-01 DIAGNOSIS — Z87.440 HISTORY OF UTI: ICD-10-CM

## 2025-03-01 DIAGNOSIS — Z79.51 COPD ON LONG-TERM INHALED STEROID THERAPY (MULTI): Primary | ICD-10-CM

## 2025-03-01 DIAGNOSIS — L03.90 CELLULITIS, UNSPECIFIED CELLULITIS SITE: ICD-10-CM

## 2025-03-01 DIAGNOSIS — Z91.81 AT RISK FOR FALLING: ICD-10-CM

## 2025-03-01 DIAGNOSIS — E11.9 TYPE 2 DIABETES MELLITUS WITHOUT COMPLICATION, WITH LONG-TERM CURRENT USE OF INSULIN (MULTI): ICD-10-CM

## 2025-03-01 DIAGNOSIS — Z79.4 TYPE 2 DIABETES MELLITUS WITHOUT COMPLICATION, WITH LONG-TERM CURRENT USE OF INSULIN (MULTI): ICD-10-CM

## 2025-03-01 PROCEDURE — 99308 SBSQ NF CARE LOW MDM 20: CPT | Performed by: INTERNAL MEDICINE

## 2025-03-01 NOTE — LETTER
Patient: Marianela Green  : 1950    Encounter Date: 2025    PLACE OF SERVICE: [facility]    This is a subsequent visit.    Subjective  Patient ID: Marianela Green is a 75 y.o. female who presents for Follow-up.    Ms. Marianela Green is a 75-year-old diabetic female with history of COPD.  She is unable to care for herself and requires supportive care.    Review of Systems   Constitutional:  Negative for chills and fever.   Cardiovascular:  Negative for chest pain.   All other systems reviewed and are negative.    Objective  /76   Pulse 78   Temp 36.5 °C (97.7 °F)   Resp 18     Physical Exam  Vitals reviewed.   Constitutional:       General: She is not in acute distress.     Comments: This is a well-developed, well-nourished female, sitting in chair   HENT:      Right Ear: Tympanic membrane, ear canal and external ear normal.      Left Ear: Tympanic membrane, ear canal and external ear normal.   Eyes:      General: No scleral icterus.     Pupils: Pupils are equal, round, and reactive to light.   Neck:      Vascular: No carotid bruit.   Cardiovascular:      Heart sounds: Normal heart sounds, S1 normal and S2 normal. No murmur heard.     No friction rub.   Pulmonary:      Effort: Pulmonary effort is normal.      Breath sounds: Decreased breath sounds (throughout.) present.   Abdominal:      Palpations: There is no hepatomegaly, splenomegaly or mass.   Musculoskeletal:         General: No swelling or deformity. Normal range of motion.      Cervical back: Neck supple.      Right lower leg: No edema.      Left lower leg: No edema.   Lymphadenopathy:      Cervical: No cervical adenopathy.      Upper Body:      Right upper body: No axillary adenopathy.      Left upper body: No axillary adenopathy.      Lower Body: No right inguinal adenopathy. No left inguinal adenopathy.   Neurological:      Mental Status: She is oriented to person, place, and time.      Cranial Nerves: Cranial nerves  2-12 are intact. No cranial nerve deficit.      Sensory: No sensory deficit.      Motor: Motor function is intact. No weakness.      Gait: Gait is intact.      Deep Tendon Reflexes: Reflexes normal.   Psychiatric:         Mood and Affect: Mood normal. Mood is not anxious or depressed. Affect is not angry.         Behavior: Behavior is not agitated.         Thought Content: Thought content normal.         Judgment: Judgment normal.     LAB WORK: Laboratory studies were reviewed.    Assessment/Plan  Problem List Items Addressed This Visit    None  Visit Diagnoses         Codes    COPD on long-term inhaled steroid therapy (Multi)    -  Primary J44.9, Z79.51    Cellulitis, unspecified cellulitis site     L03.90    Adult failure to thrive     R62.7    Type 2 diabetes mellitus without complication, with long-term current use of insulin (Multi)     E11.9, Z79.4    History of UTI     Z87.440    Weakness     R53.1    At risk for falling     Z91.81        1.  COPD, on bronchodilator therapy.  2. Recent cellulitis, continue to monitor.  3. Adult failure to thrive, encouraged to eat.  4. Diabetes, on insulin.  5. History of UTI, continue to monitor.  6. Weakness, on PT/OT  7. Fall risk, fall precaution.    Scribe Attestation  By signing my name below, I, Lesia Arreaga attest that this documentation has been prepared under the direction and in the presence of Forest Ling MD.    All medical record entries made by the scribe were personally dictated by me I have reviewed the chart and agree the record accurately reflects my personal performance of his history physical examination and management         Electronically Signed By: Forest Ling MD   3/6/25 12:28 AM

## 2025-03-03 VITALS
RESPIRATION RATE: 18 BRPM | DIASTOLIC BLOOD PRESSURE: 76 MMHG | SYSTOLIC BLOOD PRESSURE: 120 MMHG | TEMPERATURE: 97.7 F | HEART RATE: 78 BPM

## 2025-03-03 ASSESSMENT — ENCOUNTER SYMPTOMS
CHILLS: 0
FEVER: 0

## 2025-03-03 NOTE — PROGRESS NOTES
PLACE OF SERVICE: [facility]    This is a subsequent visit.    Subjective   Patient ID: Marianela Green is a 75 y.o. female who presents for Follow-up.    Ms. Marianela Green is a 75-year-old diabetic female with history of COPD.  She is unable to care for herself and requires supportive care.    Review of Systems   Constitutional:  Negative for chills and fever.   Cardiovascular:  Negative for chest pain.   All other systems reviewed and are negative.    Objective   /76   Pulse 78   Temp 36.5 °C (97.7 °F)   Resp 18     Physical Exam  Vitals reviewed.   Constitutional:       General: She is not in acute distress.     Comments: This is a well-developed, well-nourished female, sitting in chair   HENT:      Right Ear: Tympanic membrane, ear canal and external ear normal.      Left Ear: Tympanic membrane, ear canal and external ear normal.   Eyes:      General: No scleral icterus.     Pupils: Pupils are equal, round, and reactive to light.   Neck:      Vascular: No carotid bruit.   Cardiovascular:      Heart sounds: Normal heart sounds, S1 normal and S2 normal. No murmur heard.     No friction rub.   Pulmonary:      Effort: Pulmonary effort is normal.      Breath sounds: Decreased breath sounds (throughout.) present.   Abdominal:      Palpations: There is no hepatomegaly, splenomegaly or mass.   Musculoskeletal:         General: No swelling or deformity. Normal range of motion.      Cervical back: Neck supple.      Right lower leg: No edema.      Left lower leg: No edema.   Lymphadenopathy:      Cervical: No cervical adenopathy.      Upper Body:      Right upper body: No axillary adenopathy.      Left upper body: No axillary adenopathy.      Lower Body: No right inguinal adenopathy. No left inguinal adenopathy.   Neurological:      Mental Status: She is oriented to person, place, and time.      Cranial Nerves: Cranial nerves 2-12 are intact. No cranial nerve deficit.      Sensory: No sensory  deficit.      Motor: Motor function is intact. No weakness.      Gait: Gait is intact.      Deep Tendon Reflexes: Reflexes normal.   Psychiatric:         Mood and Affect: Mood normal. Mood is not anxious or depressed. Affect is not angry.         Behavior: Behavior is not agitated.         Thought Content: Thought content normal.         Judgment: Judgment normal.     LAB WORK: Laboratory studies were reviewed.    Assessment/Plan   Problem List Items Addressed This Visit    None  Visit Diagnoses         Codes    COPD on long-term inhaled steroid therapy (Multi)    -  Primary J44.9, Z79.51    Cellulitis, unspecified cellulitis site     L03.90    Adult failure to thrive     R62.7    Type 2 diabetes mellitus without complication, with long-term current use of insulin (Multi)     E11.9, Z79.4    History of UTI     Z87.440    Weakness     R53.1    At risk for falling     Z91.81        1.  COPD, on bronchodilator therapy.  2. Recent cellulitis, continue to monitor.  3. Adult failure to thrive, encouraged to eat.  4. Diabetes, on insulin.  5. History of UTI, continue to monitor.  6. Weakness, on PT/OT  7. Fall risk, fall precaution.    Scribe Attestation  By signing my name below, IMary Scribe attest that this documentation has been prepared under the direction and in the presence of Forest Ling MD.    All medical record entries made by the scribe were personally dictated by me I have reviewed the chart and agree the record accurately reflects my personal performance of his history physical examination and management

## 2025-04-07 ENCOUNTER — NURSING HOME VISIT (OUTPATIENT)
Dept: POST ACUTE CARE | Facility: EXTERNAL LOCATION | Age: 75
End: 2025-04-07
Payer: MEDICARE

## 2025-04-07 DIAGNOSIS — E11.9 TYPE 2 DIABETES MELLITUS WITHOUT COMPLICATION, WITH LONG-TERM CURRENT USE OF INSULIN: ICD-10-CM

## 2025-04-07 DIAGNOSIS — R62.7 ADULT FAILURE TO THRIVE: Primary | ICD-10-CM

## 2025-04-07 DIAGNOSIS — L03.90 CELLULITIS, UNSPECIFIED CELLULITIS SITE: ICD-10-CM

## 2025-04-07 DIAGNOSIS — Z79.4 TYPE 2 DIABETES MELLITUS WITHOUT COMPLICATION, WITH LONG-TERM CURRENT USE OF INSULIN: ICD-10-CM

## 2025-04-07 DIAGNOSIS — J44.9 CHRONIC OBSTRUCTIVE PULMONARY DISEASE, UNSPECIFIED COPD TYPE (MULTI): ICD-10-CM

## 2025-04-07 DIAGNOSIS — Z87.440 HISTORY OF UTI: ICD-10-CM

## 2025-04-07 DIAGNOSIS — Z91.81 AT RISK FOR FALLING: ICD-10-CM

## 2025-04-07 DIAGNOSIS — R53.1 WEAKNESS: ICD-10-CM

## 2025-04-07 PROCEDURE — 99308 SBSQ NF CARE LOW MDM 20: CPT | Performed by: INTERNAL MEDICINE

## 2025-04-07 NOTE — LETTER
Patient: Marianela Green  : 1950    Encounter Date: 2025    PLACE OF SERVICE:  [facility]    This is a subsequent visit.    Subjective  Patient ID: Marianela Green is a 75 y.o. female who presents for Follow-up.    Ms. Marianela Ruffin is a 75-year-old female with history of COPD and diabetes.  She is unable to care for herself and manage her affairs.  She requires supportive care.    Review of Systems   Constitutional:  Negative for chills and fever.   Cardiovascular:  Negative for chest pain.   All other systems reviewed and are negative.    Objective  /80   Pulse 76   Temp 36.6 °C (97.9 °F)   Resp 18     Physical Exam  Vitals reviewed.   Constitutional:       General: She is not in acute distress.     Comments: This is a well-developed, well-nourished female, sitting in a chair.   HENT:      Right Ear: Tympanic membrane, ear canal and external ear normal.      Left Ear: Tympanic membrane, ear canal and external ear normal.   Eyes:      General: No scleral icterus.     Pupils: Pupils are equal, round, and reactive to light.   Neck:      Vascular: No carotid bruit.   Cardiovascular:      Heart sounds: Normal heart sounds, S1 normal and S2 normal. No murmur heard.     No friction rub.   Pulmonary:      Breath sounds: Decreased breath sounds (throughout) present.   Abdominal:      Palpations: There is no hepatomegaly, splenomegaly or mass.   Musculoskeletal:         General: No swelling or deformity. Normal range of motion.      Cervical back: Neck supple.      Right lower leg: No edema.      Left lower leg: No edema.   Lymphadenopathy:      Cervical: No cervical adenopathy.      Upper Body:      Right upper body: No axillary adenopathy.      Left upper body: No axillary adenopathy.      Lower Body: No right inguinal adenopathy. No left inguinal adenopathy.   Neurological:      Mental Status: She is oriented to person, place, and time.      Cranial Nerves: Cranial nerves 2-12 are  intact. No cranial nerve deficit.      Sensory: No sensory deficit.      Motor: Motor function is intact. No weakness.      Gait: Gait is intact.      Deep Tendon Reflexes: Reflexes normal.   Psychiatric:         Mood and Affect: Mood normal. Mood is not anxious or depressed. Affect is not angry.         Behavior: Behavior is not agitated.         Thought Content: Thought content normal.         Judgment: Judgment normal.     LAB WORK:  Laboratory studies were reviewed.    Assessment/Plan  Problem List Items Addressed This Visit             ICD-10-CM    COPD (chronic obstructive pulmonary disease) (Multi) J44.9     Other Visit Diagnoses         Codes    Adult failure to thrive    -  Primary R62.7    Type 2 diabetes mellitus without complication, with long-term current use of insulin     E11.9, Z79.4    Cellulitis, unspecified cellulitis site     L03.90    History of UTI     Z87.440    Weakness     R53.1    At risk for falling     Z91.81        1. Adult failure to thrive.  Encouraged to eat.  2. Diabetes, on insulin.  3. COPD, on bronchodilator therapy.  4. Recent cellulitis.  Continue to monitor.  5. History of UTI.  Continue to monitor.  6. Weakness, on PT/OT.  7. Fall risk, on fall precautions.    Scribe Attestation  By signing my name below, IMary Scribe attest that this documentation has been prepared under the direction and in the presence of Forest Ling MD.     All medical record entries made by the scribe were personally dictated by me I have reviewed the chart and agree the record accurately reflects my personal performance of his history physical examination and management      Electronically Signed By: Forest Ling MD   4/14/25 11:58 PM

## 2025-04-14 VITALS
DIASTOLIC BLOOD PRESSURE: 80 MMHG | TEMPERATURE: 97.9 F | HEART RATE: 76 BPM | SYSTOLIC BLOOD PRESSURE: 124 MMHG | RESPIRATION RATE: 18 BRPM

## 2025-04-14 ASSESSMENT — ENCOUNTER SYMPTOMS
CHILLS: 0
FEVER: 0

## 2025-04-14 NOTE — PROGRESS NOTES
PLACE OF SERVICE:  [facility]    This is a subsequent visit.    Subjective   Patient ID: Marianela Green is a 75 y.o. female who presents for Follow-up.    Ms. Marianela Ruffin is a 75-year-old female with history of COPD and diabetes.  She is unable to care for herself and manage her affairs.  She requires supportive care.    Review of Systems   Constitutional:  Negative for chills and fever.   Cardiovascular:  Negative for chest pain.   All other systems reviewed and are negative.    Objective   /80   Pulse 76   Temp 36.6 °C (97.9 °F)   Resp 18     Physical Exam  Vitals reviewed.   Constitutional:       General: She is not in acute distress.     Comments: This is a well-developed, well-nourished female, sitting in a chair.   HENT:      Right Ear: Tympanic membrane, ear canal and external ear normal.      Left Ear: Tympanic membrane, ear canal and external ear normal.   Eyes:      General: No scleral icterus.     Pupils: Pupils are equal, round, and reactive to light.   Neck:      Vascular: No carotid bruit.   Cardiovascular:      Heart sounds: Normal heart sounds, S1 normal and S2 normal. No murmur heard.     No friction rub.   Pulmonary:      Breath sounds: Decreased breath sounds (throughout) present.   Abdominal:      Palpations: There is no hepatomegaly, splenomegaly or mass.   Musculoskeletal:         General: No swelling or deformity. Normal range of motion.      Cervical back: Neck supple.      Right lower leg: No edema.      Left lower leg: No edema.   Lymphadenopathy:      Cervical: No cervical adenopathy.      Upper Body:      Right upper body: No axillary adenopathy.      Left upper body: No axillary adenopathy.      Lower Body: No right inguinal adenopathy. No left inguinal adenopathy.   Neurological:      Mental Status: She is oriented to person, place, and time.      Cranial Nerves: Cranial nerves 2-12 are intact. No cranial nerve deficit.      Sensory: No sensory deficit.       Motor: Motor function is intact. No weakness.      Gait: Gait is intact.      Deep Tendon Reflexes: Reflexes normal.   Psychiatric:         Mood and Affect: Mood normal. Mood is not anxious or depressed. Affect is not angry.         Behavior: Behavior is not agitated.         Thought Content: Thought content normal.         Judgment: Judgment normal.     LAB WORK:  Laboratory studies were reviewed.    Assessment/Plan   Problem List Items Addressed This Visit             ICD-10-CM    COPD (chronic obstructive pulmonary disease) (Multi) J44.9     Other Visit Diagnoses         Codes    Adult failure to thrive    -  Primary R62.7    Type 2 diabetes mellitus without complication, with long-term current use of insulin     E11.9, Z79.4    Cellulitis, unspecified cellulitis site     L03.90    History of UTI     Z87.440    Weakness     R53.1    At risk for falling     Z91.81        1. Adult failure to thrive.  Encouraged to eat.  2. Diabetes, on insulin.  3. COPD, on bronchodilator therapy.  4. Recent cellulitis.  Continue to monitor.  5. History of UTI.  Continue to monitor.  6. Weakness, on PT/OT.  7. Fall risk, on fall precautions.    Scribe Attestation  By signing my name below, IMary Scribe attest that this documentation has been prepared under the direction and in the presence of Forest Ling MD.     All medical record entries made by the scribkristofer were personally dictated by me I have reviewed the chart and agree the record accurately reflects my personal performance of his history physical examination and management

## 2025-05-04 ENCOUNTER — NURSING HOME VISIT (OUTPATIENT)
Dept: POST ACUTE CARE | Facility: EXTERNAL LOCATION | Age: 75
End: 2025-05-04
Payer: MEDICARE

## 2025-05-04 DIAGNOSIS — R53.1 WEAKNESS: ICD-10-CM

## 2025-05-04 DIAGNOSIS — Z79.4 TYPE 2 DIABETES MELLITUS WITHOUT COMPLICATION, WITH LONG-TERM CURRENT USE OF INSULIN: ICD-10-CM

## 2025-05-04 DIAGNOSIS — Z87.440 HISTORY OF UTI: ICD-10-CM

## 2025-05-04 DIAGNOSIS — J44.9 CHRONIC OBSTRUCTIVE PULMONARY DISEASE, UNSPECIFIED COPD TYPE (MULTI): Primary | ICD-10-CM

## 2025-05-04 DIAGNOSIS — L03.90 CELLULITIS, UNSPECIFIED CELLULITIS SITE: ICD-10-CM

## 2025-05-04 DIAGNOSIS — Z91.81 AT RISK FOR FALLING: ICD-10-CM

## 2025-05-04 DIAGNOSIS — R62.7 ADULT FAILURE TO THRIVE: ICD-10-CM

## 2025-05-04 DIAGNOSIS — E11.9 TYPE 2 DIABETES MELLITUS WITHOUT COMPLICATION, WITH LONG-TERM CURRENT USE OF INSULIN: ICD-10-CM

## 2025-05-04 DIAGNOSIS — J45.909 ASTHMA, UNSPECIFIED ASTHMA SEVERITY, UNSPECIFIED WHETHER COMPLICATED, UNSPECIFIED WHETHER PERSISTENT (HHS-HCC): ICD-10-CM

## 2025-05-04 PROCEDURE — 99308 SBSQ NF CARE LOW MDM 20: CPT | Performed by: INTERNAL MEDICINE

## 2025-05-04 NOTE — LETTER
Patient: Marianela Green  : 1950    Encounter Date: 2025    PLACE OF SERVICE:  [facility].    This is a subsequent visit.    Subjective  Patient ID: Marianela Green is a 75 y.o. female who presents for Follow-up.    Ms. Marianela Green is a 75-year-old female with history of diabetes and COPD.  She is unable to care for herself and requires supportive care.    Review of Systems   Constitutional:  Negative for chills and fever.   Cardiovascular:  Negative for chest pain.   All other systems reviewed and are negative.    Objective  /80   Pulse 82   Temp 36.6 °C (97.9 °F)   Resp 18     Physical Exam  Vitals reviewed.   Constitutional:       General: She is not in acute distress.     Comments: This is a well-developed, well-nourished female, sitting in a chair.   HENT:      Right Ear: Tympanic membrane, ear canal and external ear normal.      Left Ear: Tympanic membrane, ear canal and external ear normal.   Eyes:      General: No scleral icterus.     Pupils: Pupils are equal, round, and reactive to light.   Neck:      Vascular: No carotid bruit.   Cardiovascular:      Heart sounds: Normal heart sounds, S1 normal and S2 normal. No murmur heard.     No friction rub.   Pulmonary:      Effort: Pulmonary effort is normal.      Breath sounds: Decreased breath sounds (throughout) present.   Abdominal:      Palpations: There is no hepatomegaly, splenomegaly or mass.   Musculoskeletal:         General: No swelling or deformity. Normal range of motion.      Cervical back: Neck supple.      Right lower leg: No edema.      Left lower leg: No edema.   Lymphadenopathy:      Cervical: No cervical adenopathy.      Upper Body:      Right upper body: No axillary adenopathy.      Left upper body: No axillary adenopathy.      Lower Body: No right inguinal adenopathy. No left inguinal adenopathy.   Neurological:      Mental Status: She is oriented to person, place, and time.      Cranial Nerves:  Cranial nerves 2-12 are intact. No cranial nerve deficit.      Sensory: No sensory deficit.      Motor: Motor function is intact. No weakness.      Gait: Gait is intact.      Deep Tendon Reflexes: Reflexes normal.   Psychiatric:         Mood and Affect: Mood normal. Mood is not anxious or depressed. Affect is not angry.         Behavior: Behavior is not agitated.         Thought Content: Thought content normal.         Judgment: Judgment normal.     LAB WORK: Laboratory studies reviewed.    Assessment/Plan  Problem List Items Addressed This Visit           ICD-10-CM       Pulmonary and Pneumonias    COPD (chronic obstructive pulmonary disease) (Multi) - Primary J44.9     Other Visit Diagnoses         Codes      Type 2 diabetes mellitus without complication, with long-term current use of insulin     E11.9, Z79.4      Adult failure to thrive     R62.7      Asthma, unspecified asthma severity, unspecified whether complicated, unspecified whether persistent ([facility])     J45.909      History of UTI     Z87.440      Cellulitis, unspecified cellulitis site     L03.90      Weakness     R53.1      At risk for falling     Z91.81        1. COPD, on bronchodilator therapy.  2. Diabetes, on insulin.  3. Adult failure to thrive, encouraged to eat.  4. Asthma, on albuterol as needed.  5. History of UTI, continue to monitor.  6. History of cellulitis, continue to monitor.  7. Weakness, on PT/OT.  8. Fall risk, fall precautions.    Scribe Attestation  By signing my name below, IDalila Scribe attest that this documentation has been prepared under the direction and in the presence of Forest Ling MD.     All medical record entries made by the scribe were personally dictated by me I have reviewed the chart and agree the record accurately reflects my personal performance of his history physical examination and management      Electronically Signed By: Forest Ling MD   5/11/25  1:46 AM

## 2025-05-09 VITALS
RESPIRATION RATE: 18 BRPM | SYSTOLIC BLOOD PRESSURE: 124 MMHG | HEART RATE: 82 BPM | DIASTOLIC BLOOD PRESSURE: 80 MMHG | TEMPERATURE: 97.9 F

## 2025-05-09 ASSESSMENT — ENCOUNTER SYMPTOMS
CHILLS: 0
FEVER: 0

## 2025-05-09 NOTE — PROGRESS NOTES
PLACE OF SERVICE:  [facility].    This is a subsequent visit.    Subjective   Patient ID: Marianela Green is a 75 y.o. female who presents for Follow-up.    Ms. Marianela Green is a 75-year-old female with history of diabetes and COPD.  She is unable to care for herself and requires supportive care.    Review of Systems   Constitutional:  Negative for chills and fever.   Cardiovascular:  Negative for chest pain.   All other systems reviewed and are negative.    Objective   /80   Pulse 82   Temp 36.6 °C (97.9 °F)   Resp 18     Physical Exam  Vitals reviewed.   Constitutional:       General: She is not in acute distress.     Comments: This is a well-developed, well-nourished female, sitting in a chair.   HENT:      Right Ear: Tympanic membrane, ear canal and external ear normal.      Left Ear: Tympanic membrane, ear canal and external ear normal.   Eyes:      General: No scleral icterus.     Pupils: Pupils are equal, round, and reactive to light.   Neck:      Vascular: No carotid bruit.   Cardiovascular:      Heart sounds: Normal heart sounds, S1 normal and S2 normal. No murmur heard.     No friction rub.   Pulmonary:      Effort: Pulmonary effort is normal.      Breath sounds: Decreased breath sounds (throughout) present.   Abdominal:      Palpations: There is no hepatomegaly, splenomegaly or mass.   Musculoskeletal:         General: No swelling or deformity. Normal range of motion.      Cervical back: Neck supple.      Right lower leg: No edema.      Left lower leg: No edema.   Lymphadenopathy:      Cervical: No cervical adenopathy.      Upper Body:      Right upper body: No axillary adenopathy.      Left upper body: No axillary adenopathy.      Lower Body: No right inguinal adenopathy. No left inguinal adenopathy.   Neurological:      Mental Status: She is oriented to person, place, and time.      Cranial Nerves: Cranial nerves 2-12 are intact. No cranial nerve deficit.      Sensory: No  sensory deficit.      Motor: Motor function is intact. No weakness.      Gait: Gait is intact.      Deep Tendon Reflexes: Reflexes normal.   Psychiatric:         Mood and Affect: Mood normal. Mood is not anxious or depressed. Affect is not angry.         Behavior: Behavior is not agitated.         Thought Content: Thought content normal.         Judgment: Judgment normal.     LAB WORK: Laboratory studies reviewed.    Assessment/Plan   Problem List Items Addressed This Visit           ICD-10-CM       Pulmonary and Pneumonias    COPD (chronic obstructive pulmonary disease) (Multi) - Primary J44.9     Other Visit Diagnoses         Codes      Type 2 diabetes mellitus without complication, with long-term current use of insulin     E11.9, Z79.4      Adult failure to thrive     R62.7      Asthma, unspecified asthma severity, unspecified whether complicated, unspecified whether persistent ([facility])     J45.909      History of UTI     Z87.440      Cellulitis, unspecified cellulitis site     L03.90      Weakness     R53.1      At risk for falling     Z91.81        1. COPD, on bronchodilator therapy.  2. Diabetes, on insulin.  3. Adult failure to thrive, encouraged to eat.  4. Asthma, on albuterol as needed.  5. History of UTI, continue to monitor.  6. History of cellulitis, continue to monitor.  7. Weakness, on PT/OT.  8. Fall risk, fall precautions.    Scribe Attestation  By signing my name below, IDalila Scribe attest that this documentation has been prepared under the direction and in the presence of Forest Ling MD.     All medical record entries made by the scribe were personally dictated by me I have reviewed the chart and agree the record accurately reflects my personal performance of his history physical examination and management

## 2025-06-01 ENCOUNTER — NURSING HOME VISIT (OUTPATIENT)
Dept: POST ACUTE CARE | Facility: EXTERNAL LOCATION | Age: 75
End: 2025-06-01
Payer: MEDICARE

## 2025-06-01 DIAGNOSIS — D49.6 NEOPLASM OF UNSPECIFIED BEHAVIOR OF BRAIN (MULTI): ICD-10-CM

## 2025-06-01 DIAGNOSIS — Z79.4 TYPE 2 DIABETES MELLITUS WITHOUT COMPLICATION, WITH LONG-TERM CURRENT USE OF INSULIN: ICD-10-CM

## 2025-06-01 DIAGNOSIS — E11.9 TYPE 2 DIABETES MELLITUS WITHOUT COMPLICATION, WITH LONG-TERM CURRENT USE OF INSULIN: ICD-10-CM

## 2025-06-01 DIAGNOSIS — Z91.81 AT RISK FOR FALLS: ICD-10-CM

## 2025-06-01 DIAGNOSIS — K21.9 GASTROESOPHAGEAL REFLUX DISEASE WITHOUT ESOPHAGITIS: ICD-10-CM

## 2025-06-01 DIAGNOSIS — C79.31 MALIGNANT NEOPLASM METASTATIC TO BRAIN (MULTI): ICD-10-CM

## 2025-06-01 DIAGNOSIS — R53.1 WEAKNESS: ICD-10-CM

## 2025-06-01 DIAGNOSIS — J44.9 CHRONIC OBSTRUCTIVE PULMONARY DISEASE, UNSPECIFIED COPD TYPE (MULTI): Primary | ICD-10-CM

## 2025-06-01 DIAGNOSIS — R62.7 ADULT FAILURE TO THRIVE: ICD-10-CM

## 2025-06-01 DIAGNOSIS — I10 HYPERTENSION, UNSPECIFIED TYPE: ICD-10-CM

## 2025-06-01 NOTE — LETTER
Patient: Marianela Green  : 1950    Encounter Date: 2025    PLACE OF SERVICE:  [facility].    This is a subsequent visit.    Subjective  Patient ID: Marianela Green is a 75 y.o. female who presents for Follow-up.    Ms. Marianela Green is a 75-year-old female with history of COPD and diabetes.  She has a history of malignant neoplasm to her brain.  She is unable to care for herself and requires supportive care.    Review of Systems   Constitutional:  Negative for chills and fever.   Cardiovascular:  Negative for chest pain.   All other systems reviewed and are negative.    Objective  /82   Pulse 80   Temp 36.6 °C (97.9 °F)   Resp 18     Physical Exam  Vitals reviewed.   Constitutional:       General: She is not in acute distress.     Comments: This is a well-developed, well-nourished female, sitting in a chair.   HENT:      Right Ear: Tympanic membrane, ear canal and external ear normal.      Left Ear: Tympanic membrane, ear canal and external ear normal.   Eyes:      General: No scleral icterus.     Pupils: Pupils are equal, round, and reactive to light.   Neck:      Vascular: No carotid bruit.   Cardiovascular:      Heart sounds: Normal heart sounds, S1 normal and S2 normal. No murmur heard.     No friction rub.   Pulmonary:      Effort: Pulmonary effort is normal.      Breath sounds: Decreased breath sounds (throughout) present.   Abdominal:      Palpations: There is no hepatomegaly, splenomegaly or mass.   Musculoskeletal:         General: No swelling or deformity. Normal range of motion.      Cervical back: Neck supple.      Right lower leg: No edema.      Left lower leg: No edema.   Lymphadenopathy:      Cervical: No cervical adenopathy.      Upper Body:      Right upper body: No axillary adenopathy.      Left upper body: No axillary adenopathy.      Lower Body: No right inguinal adenopathy. No left inguinal adenopathy.   Neurological:      Mental Status: She is oriented  to person, place, and time.      Cranial Nerves: Cranial nerves 2-12 are intact. No cranial nerve deficit.      Sensory: No sensory deficit.      Motor: Motor function is intact. No weakness.      Gait: Gait is intact.      Deep Tendon Reflexes: Reflexes normal.   Psychiatric:         Mood and Affect: Mood normal. Mood is not anxious or depressed. Affect is not angry.         Behavior: Behavior is not agitated.         Thought Content: Thought content normal.         Judgment: Judgment normal.     LAB WORK: Laboratory studies were reviewed.    Assessment/Plan  Problem List Items Addressed This Visit           ICD-10-CM       Cardiac and Vasculature    Hypertension I10       Pulmonary and Pneumonias    COPD (chronic obstructive pulmonary disease) (Multi) - Primary J44.9     Other Visit Diagnoses         Codes      Type 2 diabetes mellitus without complication, with long-term current use of insulin     E11.9, Z79.4      Adult failure to thrive     R62.7      Malignant neoplasm metastatic to brain (Multi)     C79.31      Gastroesophageal reflux disease without esophagitis     K21.9      Weakness     R53.1      At risk for falls     Z91.81        1. COPD, on bronchodilator therapy.  2. Diabetes, on insulin.  3. Adult failure to thrive, encouraged to eat.  4. Malignant neoplasm to brain, follow with Neurosurgery.  5. Hypertension, med controlled.  6. GERD, on PPI.  7. Weakness, on PT/OT.  8. Fall risk, on fall precautions.    Scribe Attestation  By signing my name below, IDalila Scribe attest that this documentation has been prepared under the direction and in the presence of Forest Ling MD.     All medical record entries made by the scribe were personally dictated by me I have reviewed the chart and agree the record accurately reflects my personal performance of his history physical examination and management      Electronically Signed By: Forest Ling MD   6/6/25 10:32 PM

## 2025-06-05 VITALS
SYSTOLIC BLOOD PRESSURE: 128 MMHG | HEART RATE: 80 BPM | RESPIRATION RATE: 18 BRPM | TEMPERATURE: 97.9 F | DIASTOLIC BLOOD PRESSURE: 82 MMHG

## 2025-06-05 ASSESSMENT — ENCOUNTER SYMPTOMS
CHILLS: 0
FEVER: 0

## 2025-06-05 NOTE — PROGRESS NOTES
PLACE OF SERVICE:  [facility].    This is a subsequent visit.    Subjective   Patient ID: Marianela Green is a 75 y.o. female who presents for Follow-up.    Ms. Marianela rGeen is a 75-year-old female with history of COPD and diabetes.  She has a history of malignant neoplasm to her brain.  She is unable to care for herself and requires supportive care.    Review of Systems   Constitutional:  Negative for chills and fever.   Cardiovascular:  Negative for chest pain.   All other systems reviewed and are negative.    Objective   /82   Pulse 80   Temp 36.6 °C (97.9 °F)   Resp 18     Physical Exam  Vitals reviewed.   Constitutional:       General: She is not in acute distress.     Comments: This is a well-developed, well-nourished female, sitting in a chair.   HENT:      Right Ear: Tympanic membrane, ear canal and external ear normal.      Left Ear: Tympanic membrane, ear canal and external ear normal.   Eyes:      General: No scleral icterus.     Pupils: Pupils are equal, round, and reactive to light.   Neck:      Vascular: No carotid bruit.   Cardiovascular:      Heart sounds: Normal heart sounds, S1 normal and S2 normal. No murmur heard.     No friction rub.   Pulmonary:      Effort: Pulmonary effort is normal.      Breath sounds: Decreased breath sounds (throughout) present.   Abdominal:      Palpations: There is no hepatomegaly, splenomegaly or mass.   Musculoskeletal:         General: No swelling or deformity. Normal range of motion.      Cervical back: Neck supple.      Right lower leg: No edema.      Left lower leg: No edema.   Lymphadenopathy:      Cervical: No cervical adenopathy.      Upper Body:      Right upper body: No axillary adenopathy.      Left upper body: No axillary adenopathy.      Lower Body: No right inguinal adenopathy. No left inguinal adenopathy.   Neurological:      Mental Status: She is oriented to person, place, and time.      Cranial Nerves: Cranial nerves 2-12 are  intact. No cranial nerve deficit.      Sensory: No sensory deficit.      Motor: Motor function is intact. No weakness.      Gait: Gait is intact.      Deep Tendon Reflexes: Reflexes normal.   Psychiatric:         Mood and Affect: Mood normal. Mood is not anxious or depressed. Affect is not angry.         Behavior: Behavior is not agitated.         Thought Content: Thought content normal.         Judgment: Judgment normal.     LAB WORK: Laboratory studies were reviewed.    Assessment/Plan   Problem List Items Addressed This Visit           ICD-10-CM       Cardiac and Vasculature    Hypertension I10       Pulmonary and Pneumonias    COPD (chronic obstructive pulmonary disease) (Multi) - Primary J44.9     Other Visit Diagnoses         Codes      Type 2 diabetes mellitus without complication, with long-term current use of insulin     E11.9, Z79.4      Adult failure to thrive     R62.7      Malignant neoplasm metastatic to brain (Multi)     C79.31      Gastroesophageal reflux disease without esophagitis     K21.9      Weakness     R53.1      At risk for falls     Z91.81        1. COPD, on bronchodilator therapy.  2. Diabetes, on insulin.  3. Adult failure to thrive, encouraged to eat.  4. Malignant neoplasm to brain, follow with Neurosurgery.  5. Hypertension, med controlled.  6. GERD, on PPI.  7. Weakness, on PT/OT.  8. Fall risk, on fall precautions.    Scribe Attestation  By signing my name below, I, Lesia Montgomery attest that this documentation has been prepared under the direction and in the presence of Forest Ling MD.     All medical record entries made by the scribe were personally dictated by me I have reviewed the chart and agree the record accurately reflects my personal performance of his history physical examination and management

## 2025-07-05 ENCOUNTER — NURSING HOME VISIT (OUTPATIENT)
Dept: POST ACUTE CARE | Facility: EXTERNAL LOCATION | Age: 75
End: 2025-07-05
Payer: MEDICARE

## 2025-07-05 DIAGNOSIS — C79.31 MALIGNANT NEOPLASM METASTATIC TO BRAIN (MULTI): ICD-10-CM

## 2025-07-05 DIAGNOSIS — I10 HYPERTENSION, UNSPECIFIED TYPE: ICD-10-CM

## 2025-07-05 DIAGNOSIS — R53.1 WEAKNESS: ICD-10-CM

## 2025-07-05 DIAGNOSIS — J44.9 CHRONIC OBSTRUCTIVE PULMONARY DISEASE, UNSPECIFIED COPD TYPE (MULTI): ICD-10-CM

## 2025-07-05 DIAGNOSIS — R62.7 ADULT FAILURE TO THRIVE: Primary | ICD-10-CM

## 2025-07-05 DIAGNOSIS — Z91.81 AT RISK FOR FALLS: ICD-10-CM

## 2025-07-05 DIAGNOSIS — Z79.4 TYPE 2 DIABETES MELLITUS WITHOUT COMPLICATION, WITH LONG-TERM CURRENT USE OF INSULIN: ICD-10-CM

## 2025-07-05 DIAGNOSIS — E11.9 TYPE 2 DIABETES MELLITUS WITHOUT COMPLICATION, WITH LONG-TERM CURRENT USE OF INSULIN: ICD-10-CM

## 2025-07-05 DIAGNOSIS — K21.9 GASTROESOPHAGEAL REFLUX DISEASE WITHOUT ESOPHAGITIS: ICD-10-CM

## 2025-07-05 NOTE — LETTER
Patient: Marianela Green  : 1950    Encounter Date: 2025    PLACE OF SERVICE:  [facility]    This is a subsequent visit.    Subjective  Patient ID: Marianela Green is a 75 y.o. female who presents for Follow-up.    Ms. Marianela Green is a 75-year-old female with history of COPD with malignant neoplasm to her brain.  She is unable to care for herself and requires supportive care.    Review of Systems   Constitutional:  Negative for chills and fever.   Cardiovascular:  Negative for chest pain.   All other systems reviewed and are negative.    Objective  /82   Pulse 78   Temp 36.7 °C (98.1 °F)   Resp 18     Physical Exam  Vitals reviewed.   Constitutional:       General: She is not in acute distress.     Comments: This is a well-developed, well-nourished female, sitting in a chair.   HENT:      Right Ear: Tympanic membrane, ear canal and external ear normal.      Left Ear: Tympanic membrane, ear canal and external ear normal.   Eyes:      General: No scleral icterus.     Pupils: Pupils are equal, round, and reactive to light.   Neck:      Vascular: No carotid bruit.   Cardiovascular:      Heart sounds: Normal heart sounds, S1 normal and S2 normal. No murmur heard.     No friction rub.   Pulmonary:      Breath sounds: Decreased breath sounds (throughout) present.   Abdominal:      Palpations: There is no hepatomegaly, splenomegaly or mass.   Musculoskeletal:         General: No swelling or deformity. Normal range of motion.      Cervical back: Neck supple.      Right lower leg: No edema.      Left lower leg: No edema.   Lymphadenopathy:      Cervical: No cervical adenopathy.      Upper Body:      Right upper body: No axillary adenopathy.      Left upper body: No axillary adenopathy.      Lower Body: No right inguinal adenopathy. No left inguinal adenopathy.   Neurological:      Mental Status: She is oriented to person, place, and time.      Cranial Nerves: Cranial nerves 2-12 are  intact. No cranial nerve deficit.      Sensory: No sensory deficit.      Motor: Motor function is intact. No weakness.      Gait: Gait is intact.      Deep Tendon Reflexes: Reflexes normal.   Psychiatric:         Mood and Affect: Mood normal. Mood is not anxious or depressed. Affect is not angry.         Behavior: Behavior is not agitated.         Thought Content: Thought content normal.         Judgment: Judgment normal.     LAB WORK:  Laboratory studies were reviewed.    Assessment/Plan  Problem List Items Addressed This Visit           ICD-10-CM    Hypertension I10    COPD (chronic obstructive pulmonary disease) (Multi) J44.9     Other Visit Diagnoses         Codes      Adult failure to thrive    -  Primary R62.7      Malignant neoplasm metastatic to brain (Multi)     C79.31      Type 2 diabetes mellitus without complication, with long-term current use of insulin     E11.9, Z79.4      Gastroesophageal reflux disease without esophagitis     K21.9      Weakness     R53.1      At risk for falls     Z91.81        1. Adult failure to thrive.  Encouraged to eat.  2. Malignant neoplasm to the brain.  Follow with Oncology.  3. COPD, on bronchodilator therapy.  4. Diabetes, on insulin.  5. GERD, on PPI.  6. Hypertension, medically controlled.  7. Weakness, on PT/OT.  8. Fall risk, on fall precautions.    Scribe Attestation  By signing my name below, I, Lesia Arreaga attest that this documentation has been prepared under the direction and in the presence of Forest Ling MD.     All medical record entries made by the scribe were personally dictated by me I have reviewed the chart and agree the record accurately reflects my personal performance of his history physical examination and management      Electronically Signed By: Forest Ling MD   7/12/25 12:10 AM

## 2025-07-09 VITALS
HEART RATE: 78 BPM | SYSTOLIC BLOOD PRESSURE: 126 MMHG | DIASTOLIC BLOOD PRESSURE: 82 MMHG | RESPIRATION RATE: 18 BRPM | TEMPERATURE: 98.1 F

## 2025-07-09 ASSESSMENT — ENCOUNTER SYMPTOMS
CHILLS: 0
FEVER: 0

## 2025-07-09 NOTE — PROGRESS NOTES
PLACE OF SERVICE:  [facility]    This is a subsequent visit.    Subjective   Patient ID: Marianela Green is a 75 y.o. female who presents for Follow-up.    Ms. Marianela Green is a 75-year-old female with history of COPD with malignant neoplasm to her brain.  She is unable to care for herself and requires supportive care.    Review of Systems   Constitutional:  Negative for chills and fever.   Cardiovascular:  Negative for chest pain.   All other systems reviewed and are negative.    Objective   /82   Pulse 78   Temp 36.7 °C (98.1 °F)   Resp 18     Physical Exam  Vitals reviewed.   Constitutional:       General: She is not in acute distress.     Comments: This is a well-developed, well-nourished female, sitting in a chair.   HENT:      Right Ear: Tympanic membrane, ear canal and external ear normal.      Left Ear: Tympanic membrane, ear canal and external ear normal.   Eyes:      General: No scleral icterus.     Pupils: Pupils are equal, round, and reactive to light.   Neck:      Vascular: No carotid bruit.   Cardiovascular:      Heart sounds: Normal heart sounds, S1 normal and S2 normal. No murmur heard.     No friction rub.   Pulmonary:      Breath sounds: Decreased breath sounds (throughout) present.   Abdominal:      Palpations: There is no hepatomegaly, splenomegaly or mass.   Musculoskeletal:         General: No swelling or deformity. Normal range of motion.      Cervical back: Neck supple.      Right lower leg: No edema.      Left lower leg: No edema.   Lymphadenopathy:      Cervical: No cervical adenopathy.      Upper Body:      Right upper body: No axillary adenopathy.      Left upper body: No axillary adenopathy.      Lower Body: No right inguinal adenopathy. No left inguinal adenopathy.   Neurological:      Mental Status: She is oriented to person, place, and time.      Cranial Nerves: Cranial nerves 2-12 are intact. No cranial nerve deficit.      Sensory: No sensory deficit.       Motor: Motor function is intact. No weakness.      Gait: Gait is intact.      Deep Tendon Reflexes: Reflexes normal.   Psychiatric:         Mood and Affect: Mood normal. Mood is not anxious or depressed. Affect is not angry.         Behavior: Behavior is not agitated.         Thought Content: Thought content normal.         Judgment: Judgment normal.     LAB WORK:  Laboratory studies were reviewed.    Assessment/Plan   Problem List Items Addressed This Visit           ICD-10-CM    Hypertension I10    COPD (chronic obstructive pulmonary disease) (Multi) J44.9     Other Visit Diagnoses         Codes      Adult failure to thrive    -  Primary R62.7      Malignant neoplasm metastatic to brain (Multi)     C79.31      Type 2 diabetes mellitus without complication, with long-term current use of insulin     E11.9, Z79.4      Gastroesophageal reflux disease without esophagitis     K21.9      Weakness     R53.1      At risk for falls     Z91.81        1. Adult failure to thrive.  Encouraged to eat.  2. Malignant neoplasm to the brain.  Follow with Oncology.  3. COPD, on bronchodilator therapy.  4. Diabetes, on insulin.  5. GERD, on PPI.  6. Hypertension, medically controlled.  7. Weakness, on PT/OT.  8. Fall risk, on fall precautions.    Scribe Attestation  By signing my name below, IMary Scribe attest that this documentation has been prepared under the direction and in the presence of Forest Ling MD.     All medical record entries made by the scribe were personally dictated by me I have reviewed the chart and agree the record accurately reflects my personal performance of his history physical examination and management

## 2025-08-09 ENCOUNTER — HOSPITAL ENCOUNTER (EMERGENCY)
Facility: HOSPITAL | Age: 75
Discharge: NURSING, REHAB OR LONG-TERM CARE | End: 2025-08-09
Attending: STUDENT IN AN ORGANIZED HEALTH CARE EDUCATION/TRAINING PROGRAM
Payer: COMMERCIAL

## 2025-08-09 ENCOUNTER — APPOINTMENT (OUTPATIENT)
Dept: RADIOLOGY | Facility: HOSPITAL | Age: 75
End: 2025-08-09
Payer: COMMERCIAL

## 2025-08-09 VITALS
RESPIRATION RATE: 20 BRPM | TEMPERATURE: 98.6 F | WEIGHT: 214 LBS | HEART RATE: 99 BPM | HEIGHT: 65 IN | DIASTOLIC BLOOD PRESSURE: 89 MMHG | BODY MASS INDEX: 35.65 KG/M2 | OXYGEN SATURATION: 95 % | SYSTOLIC BLOOD PRESSURE: 175 MMHG

## 2025-08-09 DIAGNOSIS — M25.562 ACUTE PAIN OF LEFT KNEE: Primary | ICD-10-CM

## 2025-08-09 DIAGNOSIS — M17.0 PRIMARY OSTEOARTHRITIS OF BOTH KNEES: ICD-10-CM

## 2025-08-09 LAB
ANION GAP SERPL CALCULATED.3IONS-SCNC: 10 MMOL/L (ref 10–20)
BASOPHILS # BLD AUTO: 0.08 X10*3/UL (ref 0–0.1)
BASOPHILS NFR BLD AUTO: 0.9 %
BUN SERPL-MCNC: 9 MG/DL (ref 6–23)
CALCIUM SERPL-MCNC: 9.8 MG/DL (ref 8.6–10.3)
CHLORIDE SERPL-SCNC: 102 MMOL/L (ref 98–107)
CO2 SERPL-SCNC: 31 MMOL/L (ref 21–32)
CREAT SERPL-MCNC: 0.39 MG/DL (ref 0.5–1.05)
CRP SERPL-MCNC: 1.92 MG/DL
EGFRCR SERPLBLD CKD-EPI 2021: >90 ML/MIN/1.73M*2
EOSINOPHIL # BLD AUTO: 0.26 X10*3/UL (ref 0–0.4)
EOSINOPHIL NFR BLD AUTO: 2.8 %
ERYTHROCYTE [DISTWIDTH] IN BLOOD BY AUTOMATED COUNT: 12.9 % (ref 11.5–14.5)
ERYTHROCYTE [SEDIMENTATION RATE] IN BLOOD BY WESTERGREN METHOD: 17 MM/H (ref 0–30)
GLUCOSE SERPL-MCNC: 135 MG/DL (ref 74–99)
HCT VFR BLD AUTO: 46.2 % (ref 36–46)
HGB BLD-MCNC: 15.4 G/DL (ref 12–16)
IMM GRANULOCYTES # BLD AUTO: 0.03 X10*3/UL (ref 0–0.5)
IMM GRANULOCYTES NFR BLD AUTO: 0.3 % (ref 0–0.9)
LYMPHOCYTES # BLD AUTO: 2.94 X10*3/UL (ref 0.8–3)
LYMPHOCYTES NFR BLD AUTO: 31.8 %
MCH RBC QN AUTO: 29.6 PG (ref 26–34)
MCHC RBC AUTO-ENTMCNC: 33.3 G/DL (ref 32–36)
MCV RBC AUTO: 89 FL (ref 80–100)
MONOCYTES # BLD AUTO: 0.49 X10*3/UL (ref 0.05–0.8)
MONOCYTES NFR BLD AUTO: 5.3 %
NEUTROPHILS # BLD AUTO: 5.45 X10*3/UL (ref 1.6–5.5)
NEUTROPHILS NFR BLD AUTO: 58.9 %
NRBC BLD-RTO: 0 /100 WBCS (ref 0–0)
PLATELET # BLD AUTO: 338 X10*3/UL (ref 150–450)
POTASSIUM SERPL-SCNC: 4 MMOL/L (ref 3.5–5.3)
RBC # BLD AUTO: 5.2 X10*6/UL (ref 4–5.2)
SODIUM SERPL-SCNC: 139 MMOL/L (ref 136–145)
URATE SERPL-MCNC: 2.8 MG/DL (ref 2.3–6.7)
WBC # BLD AUTO: 9.3 X10*3/UL (ref 4.4–11.3)

## 2025-08-09 PROCEDURE — 73564 X-RAY EXAM KNEE 4 OR MORE: CPT | Mod: 50

## 2025-08-09 PROCEDURE — 86140 C-REACTIVE PROTEIN: CPT | Performed by: STUDENT IN AN ORGANIZED HEALTH CARE EDUCATION/TRAINING PROGRAM

## 2025-08-09 PROCEDURE — 2500000001 HC RX 250 WO HCPCS SELF ADMINISTERED DRUGS (ALT 637 FOR MEDICARE OP): Performed by: STUDENT IN AN ORGANIZED HEALTH CARE EDUCATION/TRAINING PROGRAM

## 2025-08-09 PROCEDURE — 73564 X-RAY EXAM KNEE 4 OR MORE: CPT | Mod: BILATERAL PROCEDURE | Performed by: STUDENT IN AN ORGANIZED HEALTH CARE EDUCATION/TRAINING PROGRAM

## 2025-08-09 PROCEDURE — 85652 RBC SED RATE AUTOMATED: CPT | Performed by: STUDENT IN AN ORGANIZED HEALTH CARE EDUCATION/TRAINING PROGRAM

## 2025-08-09 PROCEDURE — 36415 COLL VENOUS BLD VENIPUNCTURE: CPT | Performed by: STUDENT IN AN ORGANIZED HEALTH CARE EDUCATION/TRAINING PROGRAM

## 2025-08-09 PROCEDURE — 99284 EMERGENCY DEPT VISIT MOD MDM: CPT | Performed by: STUDENT IN AN ORGANIZED HEALTH CARE EDUCATION/TRAINING PROGRAM

## 2025-08-09 PROCEDURE — 84550 ASSAY OF BLOOD/URIC ACID: CPT | Performed by: STUDENT IN AN ORGANIZED HEALTH CARE EDUCATION/TRAINING PROGRAM

## 2025-08-09 PROCEDURE — 85025 COMPLETE CBC W/AUTO DIFF WBC: CPT | Performed by: STUDENT IN AN ORGANIZED HEALTH CARE EDUCATION/TRAINING PROGRAM

## 2025-08-09 PROCEDURE — 82374 ASSAY BLOOD CARBON DIOXIDE: CPT | Performed by: STUDENT IN AN ORGANIZED HEALTH CARE EDUCATION/TRAINING PROGRAM

## 2025-08-09 RX ORDER — KETOROLAC TROMETHAMINE 10 MG/1
10 TABLET, FILM COATED ORAL EVERY 6 HOURS PRN
Qty: 20 TABLET | Refills: 0 | Status: SHIPPED | OUTPATIENT
Start: 2025-08-09 | End: 2025-08-14

## 2025-08-09 RX ORDER — OXYCODONE AND ACETAMINOPHEN 5; 325 MG/1; MG/1
1 TABLET ORAL EVERY 6 HOURS PRN
Qty: 12 TABLET | Refills: 0 | Status: SHIPPED | OUTPATIENT
Start: 2025-08-09 | End: 2025-08-12

## 2025-08-09 RX ORDER — OXYCODONE AND ACETAMINOPHEN 5; 325 MG/1; MG/1
1 TABLET ORAL ONCE
Refills: 0 | Status: COMPLETED | OUTPATIENT
Start: 2025-08-09 | End: 2025-08-09

## 2025-08-09 RX ADMIN — OXYCODONE HYDROCHLORIDE AND ACETAMINOPHEN 1 TABLET: 5; 325 TABLET ORAL at 08:59

## 2025-08-09 ASSESSMENT — LIFESTYLE VARIABLES
TOTAL SCORE: 0
HAVE PEOPLE ANNOYED YOU BY CRITICIZING YOUR DRINKING: NO
HAVE YOU EVER FELT YOU SHOULD CUT DOWN ON YOUR DRINKING: NO
EVER HAD A DRINK FIRST THING IN THE MORNING TO STEADY YOUR NERVES TO GET RID OF A HANGOVER: NO
EVER FELT BAD OR GUILTY ABOUT YOUR DRINKING: NO

## 2025-08-09 ASSESSMENT — PAIN SCALES - GENERAL
PAINLEVEL_OUTOF10: 10 - WORST POSSIBLE PAIN
PAINLEVEL_OUTOF10: 10 - WORST POSSIBLE PAIN

## 2025-08-09 ASSESSMENT — PAIN DESCRIPTION - LOCATION: LOCATION: KNEE

## 2025-08-09 ASSESSMENT — PAIN - FUNCTIONAL ASSESSMENT: PAIN_FUNCTIONAL_ASSESSMENT: 0-10

## 2025-08-09 ASSESSMENT — PAIN DESCRIPTION - ORIENTATION: ORIENTATION: LEFT

## 2025-08-09 ASSESSMENT — PAIN DESCRIPTION - PAIN TYPE: TYPE: CHRONIC PAIN

## 2025-08-09 ASSESSMENT — PAIN DESCRIPTION - DESCRIPTORS: DESCRIPTORS: ACHING

## 2025-08-09 NOTE — ED TRIAGE NOTES
"Pt left knee hurts \"for months\". Pt from [facility] [place]. Dr. Ling pt. Pt has hx falls, no recent falls. Pt hx brain cancer and htn. Pt ao 2 at baseline. States she's unable to bend it due to the pain, but that is normal for her.     Strong pulses are felt in both feet   "

## 2025-08-09 NOTE — PROGRESS NOTES
Spiritual Care Visit  Spiritual Care Request    Reason for Visit:  Routine Visit: Introduction  Crisis Visit: ED     Request Received From:       Focus of Care:  Visited With: Patient and family together         Refer to :          Spiritual Care Assessment    Spiritual Assessment:                      Care Provided:  Intended Effects: Build relationship of care and support, Convey a calming presence, Demonstrate caring and concern    Sense of Community and or Advent Affiliation:  Zoroastrian   Values/Beliefs  Spiritual Requests During Hospitalization: Marianela asked to be anointed today     Addressed Needs/Concerns and/or [place] Through:     Sacramental Encounters  Sacrament of Sick-Anointing: Anointed    Outcome:        Advance Directives:         Spiritual Care Annotation    Annotation:  Marianela asked to be anointed today  Daughter=Bridget Jaramillo

## 2025-08-09 NOTE — DISCHARGE INSTRUCTIONS
Treatment for joint pain frequently involves rest, ice, elevation, compression of the affected area.  You can use Tylenol as well for pain relief at home in addition to medications prescribed today.  Follow-up with orthopedic surgery for any ongoing symptoms despite treatment and conservative management at home.    Return to the emergency department for any new or worsening symptoms including development of redness of the joint which is hot and painful to touch, inability to bend the knee due to severe pain, fevers which could be signs of infection in the joint and require management at the hospital immediately.  You should also return if you develop severe worsening of pain that is unmanageable at home, inability to walk due to your symptoms which would require reassessment and may require further treatment at the hospital.

## 2025-08-10 NOTE — ED PROVIDER NOTES
HPI   Chief Complaint   Patient presents with    Knee Pain       This is a 75-year-old female presenting to the ED from her nursing facility for evaluation of left knee pain.  She states that she has had bilateral knee pain which is chronic for months, she is unable to walk because of this.  She denies any trauma or injury, any falls recently before the exacerbation of her left knee pain but she feels over the past couple of days her knee pain has been worse than her usual baseline.  Daughter is also noted swelling of both knees which has progressed over time.  The patient states that she is treated with pain medication at the facility including lidocaine patches bilaterally, oral pain pills but her left knee pain is still out of control.  She was sent to the ED for lab work to evaluate for gout or other cause of her pain, imaging, and hopefully improved control of the patient's pain.  She has had no fevers or chills, no recent illness or infection, no surgeries on the knee.  Her daughter at bedside states that she was advised to have a knee replacement previously due to severe arthritis but never had this done.      History provided by:  Patient   used: No            Patient History   Medical History[1]  Surgical History[2]  Family History[3]  Social History[4]    Physical Exam   ED Triage Vitals [08/09/25 0828]   Temperature Heart Rate Respirations BP   37 °C (98.6 °F) (!) 107 20 (!) 184/96      Pulse Ox Temp Source Heart Rate Source Patient Position   96 % Oral Monitor --      BP Location FiO2 (%)     Left arm --       Physical Exam  General: well developed, well nourished elderly female who is awake and alert, in no apparent distress.  Daughter at bedside.  HENT: normocephalic, atraumatic.   CV: regular rate and rhythm, no murmur, no gallops, or rubs. dorsalis pedis pulses +2/4 bilaterally  Resp: clear to ascultation bilaterally, no wheezes, rales, or rhonchi  GI: abdomen soft, nontender  without rigidity or guarding, no peritoneal signs, abdomen is nondistended, no masses palpated  MSK: She has tenderness palpation at the left knee anteriorly, she has small joint effusions bilaterally.  There is no erythema or warmth overlying the joint spaces themselves.  She has limited range of motion in flexion and extension of the bilateral knees due to pain.  She states that she cannot bend them fully and that this is her baseline range of motion.  No bruising or signs of visible deformity present otherwise.  Neuro: Sensation fully intact to lower extremities bilaterally.  Psych: appropriate mood and affect, cooperative with exam  Skin: warm, dry, without evidence of rash or abrasions.  No erythema or warmth over the affected joints.    ED Course & MDM   Diagnoses as of 08/10/25 0812   Acute pain of left knee   Primary osteoarthritis of both knees                 No data recorded                                 Medical Decision Making  The patient is awake and alert, in no acute distress here.  She reports chronic bilateral knee pain ongoing for months but recent exacerbation of her left knee pain over the past few days.  She came with documentation from the facility requesting blood work and uric acid testing, I do not suspect gout given her physical exam for septic arthritis given her clinical picture without any significant unilateral joint swelling, erythema, warmth of the joints or diminished range of motion from her baseline.  Lab work shows normal ESR and only minimally elevated CRP, no leukocytosis, she has no fevers or infectious symptoms otherwise.  Uric acid level is normal as well.  These findings are not suggestive of gout or septic arthritis.  She was given oxycodone here with complete resolution of her pain.  She was prescribed this to use as needed for severe pain at the facility.  X-ray imaging which I reviewed shows severe bilateral osteoarthritis with small joint effusions.  It is likely  that this is causing the exacerbation of her pain currently.  Return precautions were discussed including signs of developing infection which require reassessment, repeat lab work and potentially even IV antibiotics and admission for treatment.  The patient and daughter at bedside are agreeable with the plan and understands recommendations, she was discharged back to facility in stable condition.    XR knee 4+ views bilateral   Final Result   No acute fracture or dislocation of both knees.   Severe bilateral osteoarthritis.             MACRO:   None        Signed by: Ross Rm 8/9/2025 10:42 AM   Dictation workstation:   [id number]        Labs Reviewed   CBC WITH AUTO DIFFERENTIAL - Abnormal       Result Value    WBC 9.3      nRBC 0.0      RBC 5.20      Hemoglobin 15.4      Hematocrit 46.2 (*)     MCV 89      MCH 29.6      MCHC 33.3      RDW 12.9      Platelets 338      Neutrophils % 58.9      Immature Granulocytes %, Automated 0.3      Lymphocytes % 31.8      Monocytes % 5.3      Eosinophils % 2.8      Basophils % 0.9      Neutrophils Absolute 5.45      Immature Granulocytes Absolute, Automated 0.03      Lymphocytes Absolute 2.94      Monocytes Absolute 0.49      Eosinophils Absolute 0.26      Basophils Absolute 0.08     BASIC METABOLIC PANEL - Abnormal    Glucose 135 (*)     Sodium 139      Potassium 4.0      Chloride 102      Bicarbonate 31      Anion Gap 10      Urea Nitrogen 9      Creatinine 0.39 (*)     eGFR >90      Calcium 9.8     C-REACTIVE PROTEIN - Abnormal    C-Reactive Protein 1.92 (*)    SEDIMENTATION RATE, AUTOMATED - Normal    Sedimentation Rate 17     URIC ACID - Normal    Uric Acid 2.8           Procedure  Procedures       [1]   Past Medical History:  Diagnosis Date    Abnormal chest x-ray     Adult failure to thrive     Asthma     At risk for falling     Bilateral lower leg cellulitis     Candida infection     Cellulitis     lower extremity    COPD (chronic obstructive pulmonary disease)  (Multi)     COVID-19     Diabetes mellitus (Multi)     Frequent falls     GERD (gastroesophageal reflux disease)     Hyperglycemia     Hypertension     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 No pertinent surgical history.  [3]   Family History  Problem Relation Name Age of Onset    Hypertension Other      Diabetes Other     [4]   Social History  Tobacco Use    Smoking status: Every Day     Current packs/day: 0.25     Types: Cigarettes    Smokeless tobacco: Never   Substance Use Topics    Alcohol use: Never    Drug use: Never        Hakeem Centeno DO  08/10/25 0817

## 2025-08-17 ENCOUNTER — NURSING HOME VISIT (OUTPATIENT)
Dept: POST ACUTE CARE | Facility: EXTERNAL LOCATION | Age: 75
End: 2025-08-17
Payer: COMMERCIAL

## 2025-08-17 DIAGNOSIS — K21.9 GASTROESOPHAGEAL REFLUX DISEASE WITHOUT ESOPHAGITIS: ICD-10-CM

## 2025-08-17 DIAGNOSIS — R62.7 ADULT FAILURE TO THRIVE: ICD-10-CM

## 2025-08-17 DIAGNOSIS — C79.31 MALIGNANT NEOPLASM METASTATIC TO BRAIN (MULTI): ICD-10-CM

## 2025-08-17 DIAGNOSIS — Z79.4 TYPE 2 DIABETES MELLITUS WITHOUT COMPLICATION, WITH LONG-TERM CURRENT USE OF INSULIN: ICD-10-CM

## 2025-08-17 DIAGNOSIS — I10 HYPERTENSION, UNSPECIFIED TYPE: ICD-10-CM

## 2025-08-17 DIAGNOSIS — E11.9 TYPE 2 DIABETES MELLITUS WITHOUT COMPLICATION, WITH LONG-TERM CURRENT USE OF INSULIN: ICD-10-CM

## 2025-08-17 DIAGNOSIS — J44.9 CHRONIC OBSTRUCTIVE PULMONARY DISEASE, UNSPECIFIED COPD TYPE (MULTI): Primary | ICD-10-CM

## 2025-08-17 DIAGNOSIS — Z91.81 AT RISK FOR FALLS: ICD-10-CM

## 2025-08-17 PROCEDURE — 99308 SBSQ NF CARE LOW MDM 20: CPT | Performed by: INTERNAL MEDICINE

## 2025-08-20 VITALS
TEMPERATURE: 97.9 F | DIASTOLIC BLOOD PRESSURE: 80 MMHG | RESPIRATION RATE: 18 BRPM | HEART RATE: 76 BPM | SYSTOLIC BLOOD PRESSURE: 126 MMHG

## 2025-08-20 ASSESSMENT — ENCOUNTER SYMPTOMS
CHILLS: 0
FEVER: 0